# Patient Record
Sex: FEMALE | Race: WHITE | Employment: PART TIME | URBAN - METROPOLITAN AREA
[De-identification: names, ages, dates, MRNs, and addresses within clinical notes are randomized per-mention and may not be internally consistent; named-entity substitution may affect disease eponyms.]

---

## 2017-03-24 ENCOUNTER — EMPLOYEE WELLNESS (OUTPATIENT)
Dept: OTHER | Age: 57
End: 2017-03-24

## 2018-03-02 ENCOUNTER — EMPLOYEE WELLNESS (OUTPATIENT)
Dept: OTHER | Age: 58
End: 2018-03-02

## 2018-03-02 LAB
CHOLESTEROL, TOTAL: 201 MG/DL (ref 0–199)
GLUCOSE BLD-MCNC: 94 MG/DL (ref 74–107)
HDLC SERPL-MCNC: 96 MG/DL
LDL CHOLESTEROL CALCULATED: 86 MG/DL (ref 0–99)
TRIGL SERPL-MCNC: 94 MG/DL (ref 0–149)

## 2018-03-20 VITALS — BODY MASS INDEX: 20.55 KG/M2 | WEIGHT: 116 LBS

## 2018-04-02 VITALS — BODY MASS INDEX: 20.02 KG/M2 | WEIGHT: 113 LBS

## 2018-04-11 ENCOUNTER — HOSPITAL ENCOUNTER (OUTPATIENT)
Dept: GENERAL RADIOLOGY | Age: 58
Discharge: HOME | End: 2018-04-13
Payer: COMMERCIAL

## 2018-04-11 DIAGNOSIS — Z12.31 VISIT FOR SCREENING MAMMOGRAM: ICD-10-CM

## 2018-04-11 PROCEDURE — 77063 BREAST TOMOSYNTHESIS BI: CPT

## 2018-04-30 DIAGNOSIS — E03.9 ACQUIRED HYPOTHYROIDISM: ICD-10-CM

## 2018-04-30 RX ORDER — LEVOTHYROXINE SODIUM 50 MCG
50 TABLET ORAL DAILY
Qty: 90 TABLET | Refills: 1 | Status: SHIPPED | OUTPATIENT
Start: 2018-04-30 | End: 2018-05-04 | Stop reason: SDUPTHER

## 2018-05-04 ENCOUNTER — OFFICE VISIT (OUTPATIENT)
Dept: FAMILY MEDICINE CLINIC | Age: 58
End: 2018-05-04
Payer: COMMERCIAL

## 2018-05-04 ENCOUNTER — HOSPITAL ENCOUNTER (OUTPATIENT)
Age: 58
Discharge: HOME | End: 2018-05-06
Payer: COMMERCIAL

## 2018-05-04 VITALS
HEART RATE: 67 BPM | SYSTOLIC BLOOD PRESSURE: 122 MMHG | DIASTOLIC BLOOD PRESSURE: 68 MMHG | OXYGEN SATURATION: 98 % | BODY MASS INDEX: 20.98 KG/M2 | HEIGHT: 62 IN | WEIGHT: 114 LBS | TEMPERATURE: 97.7 F

## 2018-05-04 DIAGNOSIS — M25.551 RIGHT HIP PAIN: ICD-10-CM

## 2018-05-04 DIAGNOSIS — Z11.59 NEED FOR HEPATITIS C SCREENING TEST: ICD-10-CM

## 2018-05-04 DIAGNOSIS — E03.9 ACQUIRED HYPOTHYROIDISM: Primary | ICD-10-CM

## 2018-05-04 DIAGNOSIS — Z00.00 HEALTH CARE MAINTENANCE: ICD-10-CM

## 2018-05-04 DIAGNOSIS — Z12.11 COLON CANCER SCREENING: ICD-10-CM

## 2018-05-04 DIAGNOSIS — R53.82 CHRONIC FATIGUE: ICD-10-CM

## 2018-05-04 DIAGNOSIS — Z11.4 ENCOUNTER FOR SCREENING FOR HIV: ICD-10-CM

## 2018-05-04 PROCEDURE — 36415 COLL VENOUS BLD VENIPUNCTURE: CPT | Performed by: FAMILY MEDICINE

## 2018-05-04 PROCEDURE — 86803 HEPATITIS C AB TEST: CPT

## 2018-05-04 PROCEDURE — 99214 OFFICE O/P EST MOD 30 MIN: CPT | Performed by: FAMILY MEDICINE

## 2018-05-04 PROCEDURE — 86703 HIV-1/HIV-2 1 RESULT ANTBDY: CPT

## 2018-05-04 RX ORDER — LEVOTHYROXINE SODIUM 50 MCG
50 TABLET ORAL DAILY
Qty: 90 TABLET | Refills: 1
Start: 2018-05-04 | End: 2018-10-21 | Stop reason: SDUPTHER

## 2018-05-04 ASSESSMENT — ENCOUNTER SYMPTOMS
SPUTUM PRODUCTION: 0
SORE THROAT: 0
WHEEZING: 0
SHORTNESS OF BREATH: 0
COUGH: 0
ABDOMINAL PAIN: 0
HEARTBURN: 0
DOUBLE VISION: 0
NAUSEA: 0
ORTHOPNEA: 0
BACK PAIN: 0
DIARRHEA: 0
BLOOD IN STOOL: 0
VOMITING: 0
BLURRED VISION: 0
CONSTIPATION: 1

## 2018-05-04 ASSESSMENT — PATIENT HEALTH QUESTIONNAIRE - PHQ9
SUM OF ALL RESPONSES TO PHQ9 QUESTIONS 1 & 2: 1
1. LITTLE INTEREST OR PLEASURE IN DOING THINGS: 1
SUM OF ALL RESPONSES TO PHQ QUESTIONS 1-9: 1
2. FEELING DOWN, DEPRESSED OR HOPELESS: 0

## 2018-05-07 LAB
HEPATITIS C ANTIBODY INTERPRETATION: NORMAL
HIV-1 AND HIV-2 ANTIBODIES: NORMAL

## 2018-05-09 ENCOUNTER — TELEPHONE (OUTPATIENT)
Dept: SURGERY | Age: 58
End: 2018-05-09

## 2018-05-14 ENCOUNTER — TELEPHONE (OUTPATIENT)
Dept: SURGERY | Age: 58
End: 2018-05-14

## 2018-05-16 ENCOUNTER — TELEPHONE (OUTPATIENT)
Dept: SURGERY | Age: 58
End: 2018-05-16

## 2018-06-01 ENCOUNTER — TELEPHONE (OUTPATIENT)
Dept: SURGERY | Age: 58
End: 2018-06-01

## 2018-06-03 PROBLEM — Z00.00 HEALTH CARE MAINTENANCE: Status: RESOLVED | Noted: 2018-05-04 | Resolved: 2018-06-03

## 2018-06-06 ENCOUNTER — TELEPHONE (OUTPATIENT)
Dept: SURGERY | Age: 58
End: 2018-06-06

## 2018-07-10 ENCOUNTER — OFFICE VISIT (OUTPATIENT)
Dept: SURGERY | Age: 58
End: 2018-07-10
Payer: COMMERCIAL

## 2018-07-10 VITALS
RESPIRATION RATE: 16 BRPM | HEIGHT: 63 IN | DIASTOLIC BLOOD PRESSURE: 77 MMHG | BODY MASS INDEX: 20.38 KG/M2 | OXYGEN SATURATION: 99 % | WEIGHT: 115 LBS | TEMPERATURE: 97.9 F | SYSTOLIC BLOOD PRESSURE: 127 MMHG | HEART RATE: 64 BPM

## 2018-07-10 DIAGNOSIS — Z12.11 ENCOUNTER FOR SCREENING COLONOSCOPY: Primary | ICD-10-CM

## 2018-07-10 PROCEDURE — 99999 PR OFFICE/OUTPT VISIT,PROCEDURE ONLY: CPT | Performed by: SURGERY

## 2018-07-10 PROCEDURE — 99202 OFFICE O/P NEW SF 15 MIN: CPT | Performed by: SURGERY

## 2018-07-10 ASSESSMENT — ENCOUNTER SYMPTOMS
EYES NEGATIVE: 1
GASTROINTESTINAL NEGATIVE: 1
RESPIRATORY NEGATIVE: 1

## 2018-07-10 NOTE — PROGRESS NOTES
 polyethylene glycol (GOLYTELY) 236 g solution Take 4,000 mLs by mouth once for 1 dose 4000 mL 0    SYNTHROID 50 MCG tablet Take 1 tablet by mouth daily 90 tablet 1    clotrimazole-betamethasone (LOTRISONE) 1-0.05 % cream Apply topically 2 times daily Apply daily to feet, 3 weeks on and 1 week off, prescribed by Dr. Les Walsh      Multiple Vitamins-Minerals (THERAPEUTIC MULTIVITAMIN-MINERALS) tablet Take 1 tablet by mouth daily      Ascorbic Acid (VITAMIN C) 500 MG CAPS Take 2 tablets by mouth 2 times daily      calcium citrate-vitamin D (CITRICAL + D) 315-250 MG-UNIT TABS per tablet Take 1 tablet by mouth 2 times daily (with meals)      senna (SENOKOT) 8.6 MG tablet Take 2 tablets by mouth daily Indications: Constipation       estradiol (ESTRACE) 1 MG tablet Take 1 mg by mouth daily        No current facility-administered medications for this visit. Facility-Administered Medications Ordered in Other Visits   Medication Dose Route Frequency Provider Last Rate Last Dose    iohexol (OMNIPAQUE 240) injection 50 mL  50 mL Oral ONCE PRN Francies Schilder, MD         Review of Systems   Constitutional: Negative. HENT: Negative. Eyes: Negative. Respiratory: Negative. Cardiovascular: Negative. Gastrointestinal: Negative. Genitourinary: Negative. Musculoskeletal: Negative. Skin: Negative. Neurological: Negative. Endo/Heme/Allergies: Negative. Psychiatric/Behavioral: Negative. /77 (Site: Left Arm, Position: Sitting, Cuff Size: Medium Adult)   Pulse 64   Temp 97.9 °F (36.6 °C) (Oral)   Resp 16   Ht 5' 3\" (1.6 m)   Wt 115 lb (52.2 kg)   LMP 01/01/2005   SpO2 99%   BMI 20.37 kg/m²   Physical Exam   Constitutional: She is oriented to person, place, and time. She appears well-developed and well-nourished. HENT:   Head: Normocephalic and atraumatic. Eyes: EOM are normal. Pupils are equal, round, and reactive to light. Neck: Normal range of motion.  No tracheal

## 2018-07-10 NOTE — PATIENT INSTRUCTIONS
Patient Information and Instructions for Colonoscopy         Definition of Colonoscopy   A colonoscopy is the visual exam of the rectum and colon (large intestine). The exam is done with a tool called a colonoscope. The colonoscope is a flexible tube with a tiny camera on the end. This instrument allows the doctor to view the inside of your rectum and colon. Sigmoidoscopy is a shorter scope that views only the last one third of the colon. Reasons for Colonoscopy   It is used to examine, diagnose, and treat problems in your large intestine. The procedure is most often done for the following reasons: To determine the cause of abdominal pain, rectal bleeding, or a change in bowel habits   To detect and treat colon cancer or colon polyps   To obtain tissue samples for testing   To stop intestinal bleeding   Monitor response to treatment if you have inflammatory bowel disease     Possible Complications   Complications are rare, but no procedure is completely free of risk. If you are planning to have a colonoscopy, your doctor will review a list of possible complications, which may include:   Bleeding   Reaction to the sedation causing drop in your blood pressure or problems breathing  Perforation or puncture of the bowel     Factors that may increase the risk of complications include:   Pre-existing heart or kidney condition   Treatment with certain medicines, including aspirin and other drugs with anticoagulant or blood-thinning properties   Prior abdominal surgery or radiation treatments   Active colitis , diverticulitis , or other acute bowel disease   Previous treatment with radiation therapy     Be sure to discuss these risks with your doctor before the procedure.      What to Expect   Prior to Procedure   Your doctor will likely do the following:   Physical exam   Health history   Review of medicines   Test your stool for hidden blood (called \"occult blood\")     Your colon must be completely clean before the liquids before the prep. Stay hydrated by drinking all required clear liquids during the prep. Replenish your system by drinking clear liquids after returning home from your colonoscopy. [facility] [place] [facility]  Golytely or Nulytely  COLON PREP FOR COLONOSCOPY OR COLON SURGERY    It is very important that you follow all of the instructions listed on this sheet carefully (they may be slightly different than the directions on the product that you purchase at the pharmacy) to ensure that you colon is adequately cleaned out or you risk of complications could be increased. 2 Days or More Before Endoscopy:   Obtain Golytely, Colyte or Nulytely, depending on the prescription the surgeon gave you, from the pharmacy.  Mix Golytely, Colyte or Nulytely according to instructions and refrigerate.  Do not eat corn, tomatoes, peas or watermelon 3 to 5 days before procedure.  If you are on INSULIN or OTHER DIABETIC MEDICATIONS, then check with your primary care physician as to how to adjust your medication while on clear liquid diet and when nothing by mouth. 1 Day Before the Endoscopy:   No solid food - only clear liquids (soup, jello, or juice that you can see through with no solid food) for breakfast, lunch and supper. ·  DO NOT drink or eat anything that is red or purple as it will turn the inside of the colon red and look like blood.  Begin drinking the Golytely, Colyte or Nulytely early in the afternoon (between 1pm and 4pm - the earlier the better). Drink and 8oz glass of this solution every 10 minutes until you have drank the entire gallon. It is best to drink the whole glass rapidly rather than sipping small amounts continuously.  Bowel movements should occur about one hour after the first glass of Golytely, Colyte, or Nulytely. They will continue periodically for approximately 1-2 hours after you finish drinking the last glass.   By this time the stool should be liquid and clear.   Feelings of bloating and/or nausea are common after the first few glasses because of the large volume of fluid ingested. This is temporary, however, and will improve once bowel movements begin.  If you have nausea or vomiting, notify your physician. Day of Endoscopy:   Nothing to eat or drink after midnight the night before the endoscopy. However, if you are taking any blood pressure medications or heart medications, you should take them with a sip of water. Any questions or concerns call Nel at [phone].

## 2018-07-26 ENCOUNTER — TELEPHONE (OUTPATIENT)
Dept: SURGERY | Age: 58
End: 2018-07-26

## 2018-07-26 NOTE — TELEPHONE ENCOUNTER
MARILIN Morgan contacted [employer] [facility] for prior authorization. No prior authorization needed for colonoscopy with Dr. David Loaiza at [address] [place] [address] in [facility] [place]. MA Spoke with Jimmy Washington, authorization Specialist. Reference number [id number]. MA scanned authorization form in media tab.     Electronically signed by Andrew Rao MA on 7/26/18 at 9:31 AM

## 2018-08-04 ENCOUNTER — PREP FOR PROCEDURE (OUTPATIENT)
Dept: SURGERY | Age: 58
End: 2018-08-04

## 2018-08-04 RX ORDER — 0.9 % SODIUM CHLORIDE 0.9 %
10 VIAL (ML) INJECTION PRN
Status: CANCELLED | OUTPATIENT
Start: 2018-08-04 | End: 2019-08-04

## 2018-08-04 RX ORDER — 0.9 % SODIUM CHLORIDE 0.9 %
10 VIAL (ML) INJECTION EVERY 12 HOURS SCHEDULED
Status: CANCELLED | OUTPATIENT
Start: 2018-08-04 | End: 2019-08-04

## 2018-08-04 RX ORDER — SODIUM CHLORIDE 9 MG/ML
INJECTION, SOLUTION INTRAVENOUS CONTINUOUS
Status: CANCELLED | OUTPATIENT
Start: 2018-08-04 | End: 2019-08-04

## 2018-08-06 ENCOUNTER — ANESTHESIA (OUTPATIENT)
Dept: ENDOSCOPY | Age: 58
End: 2018-08-06
Payer: COMMERCIAL

## 2018-08-06 ENCOUNTER — HOSPITAL ENCOUNTER (OUTPATIENT)
Age: 58
Setting detail: OUTPATIENT SURGERY
Discharge: HOME | End: 2018-08-06
Attending: SURGERY | Admitting: SURGERY
Payer: COMMERCIAL

## 2018-08-06 ENCOUNTER — ANESTHESIA EVENT (OUTPATIENT)
Dept: ENDOSCOPY | Age: 58
End: 2018-08-06
Payer: COMMERCIAL

## 2018-08-06 VITALS
DIASTOLIC BLOOD PRESSURE: 53 MMHG | RESPIRATION RATE: 11 BRPM | SYSTOLIC BLOOD PRESSURE: 91 MMHG | OXYGEN SATURATION: 99 %

## 2018-08-06 VITALS
SYSTOLIC BLOOD PRESSURE: 112 MMHG | TEMPERATURE: 97 F | OXYGEN SATURATION: 100 % | HEIGHT: 63 IN | HEART RATE: 55 BPM | RESPIRATION RATE: 16 BRPM | DIASTOLIC BLOOD PRESSURE: 70 MMHG | BODY MASS INDEX: 20.38 KG/M2 | WEIGHT: 115 LBS

## 2018-08-06 PROCEDURE — 3700000001 HC ADD 15 MINUTES (ANESTHESIA): Performed by: SURGERY

## 2018-08-06 PROCEDURE — 7100000011 HC PHASE II RECOVERY - ADDTL 15 MIN: Performed by: SURGERY

## 2018-08-06 PROCEDURE — 2580000003 HC RX 258: Performed by: SURGERY

## 2018-08-06 PROCEDURE — 3700000000 HC ANESTHESIA ATTENDED CARE: Performed by: SURGERY

## 2018-08-06 PROCEDURE — 3609010300 HC COLONOSCOPY W/BIOPSY SINGLE/MULTIPLE: Performed by: SURGERY

## 2018-08-06 PROCEDURE — 6360000002 HC RX W HCPCS: Performed by: PHYSICIAN ASSISTANT

## 2018-08-06 PROCEDURE — 2720000010 HC SURG SUPPLY STERILE: Performed by: SURGERY

## 2018-08-06 PROCEDURE — 7100000010 HC PHASE II RECOVERY - FIRST 15 MIN: Performed by: SURGERY

## 2018-08-06 PROCEDURE — 88305 TISSUE EXAM BY PATHOLOGIST: CPT

## 2018-08-06 PROCEDURE — 45380 COLONOSCOPY AND BIOPSY: CPT | Performed by: SURGERY

## 2018-08-06 RX ORDER — PROPOFOL 10 MG/ML
INJECTION, EMULSION INTRAVENOUS PRN
Status: DISCONTINUED | OUTPATIENT
Start: 2018-08-06 | End: 2018-08-06 | Stop reason: SDUPTHER

## 2018-08-06 RX ORDER — FENTANYL CITRATE 50 UG/ML
INJECTION, SOLUTION INTRAMUSCULAR; INTRAVENOUS PRN
Status: DISCONTINUED | OUTPATIENT
Start: 2018-08-06 | End: 2018-08-06 | Stop reason: SDUPTHER

## 2018-08-06 RX ORDER — 0.9 % SODIUM CHLORIDE 0.9 %
10 VIAL (ML) INJECTION EVERY 12 HOURS SCHEDULED
Status: DISCONTINUED | OUTPATIENT
Start: 2018-08-06 | End: 2018-08-06 | Stop reason: HOSPADM

## 2018-08-06 RX ORDER — MIDAZOLAM HYDROCHLORIDE 1 MG/ML
INJECTION INTRAMUSCULAR; INTRAVENOUS PRN
Status: DISCONTINUED | OUTPATIENT
Start: 2018-08-06 | End: 2018-08-06 | Stop reason: SDUPTHER

## 2018-08-06 RX ORDER — 0.9 % SODIUM CHLORIDE 0.9 %
10 VIAL (ML) INJECTION PRN
Status: DISCONTINUED | OUTPATIENT
Start: 2018-08-06 | End: 2018-08-06 | Stop reason: HOSPADM

## 2018-08-06 RX ORDER — SODIUM CHLORIDE 9 MG/ML
INJECTION, SOLUTION INTRAVENOUS CONTINUOUS
Status: DISCONTINUED | OUTPATIENT
Start: 2018-08-06 | End: 2018-08-06 | Stop reason: HOSPADM

## 2018-08-06 RX ORDER — PROPOFOL 10 MG/ML
INJECTION, EMULSION INTRAVENOUS PRN
Status: DISCONTINUED | OUTPATIENT
Start: 2018-08-06 | End: 2018-08-06

## 2018-08-06 RX ADMIN — PROPOFOL 210 MG: 10 INJECTION, EMULSION INTRAVENOUS at 12:45

## 2018-08-06 RX ADMIN — FENTANYL CITRATE 100 MCG: 50 INJECTION, SOLUTION INTRAMUSCULAR; INTRAVENOUS at 12:20

## 2018-08-06 RX ADMIN — MIDAZOLAM HYDROCHLORIDE 2 MG: 1 INJECTION, SOLUTION INTRAMUSCULAR; INTRAVENOUS at 12:20

## 2018-08-06 RX ADMIN — SODIUM CHLORIDE: 9 INJECTION, SOLUTION INTRAVENOUS at 12:20

## 2018-08-06 ASSESSMENT — PAIN SCALES - GENERAL
PAINLEVEL_OUTOF10: 0
PAINLEVEL_OUTOF10: 0

## 2018-08-06 ASSESSMENT — PAIN - FUNCTIONAL ASSESSMENT: PAIN_FUNCTIONAL_ASSESSMENT: 0-10

## 2018-08-06 NOTE — H&P (VIEW-ONLY)
to person, place, and time. She appears well-developed and well-nourished. HENT:   Head: Normocephalic and atraumatic. Eyes: EOM are normal. Pupils are equal, round, and reactive to light. Neck: Normal range of motion. No tracheal deviation present. Cardiovascular: Normal rate and regular rhythm. Pulmonary/Chest: Effort normal and breath sounds normal.   Abdominal: Soft. She exhibits no distension. There is no tenderness. Musculoskeletal: Normal range of motion. She exhibits no edema. Neurological: She is alert and oriented to person, place, and time. Skin: Skin is warm and dry. Psychiatric: She has a normal mood and affect. Her behavior is normal. Judgment and thought content normal.      ASSESSMENT/PLAN:  1. Age as a risk factor for colon cancer--colonoscopy     Colonoscopy. The patient was explained the risks/benefits/alternatives/expected outcomes of the procedure. The patient was explained the risks of the procedure, including, but not limited to, the risk of reaction to the anesthesia medicine and the risk of perforation requiring further surgery. The patient was informed that they may require biopsy or polypectomy. These procedures may increase the risk of complication. All questions were answered.   The patient verbalized understanding and agreed to proceed.     Darian Hurst  7/10/2018  1:29 PM

## 2018-09-19 ENCOUNTER — PATIENT MESSAGE (OUTPATIENT)
Dept: FAMILY MEDICINE CLINIC | Age: 58
End: 2018-09-19

## 2018-09-19 DIAGNOSIS — Z78.0 POSTMENOPAUSAL: ICD-10-CM

## 2018-09-19 DIAGNOSIS — M19.90 ARTHRITIS: Primary | ICD-10-CM

## 2018-09-19 DIAGNOSIS — E03.9 ACQUIRED HYPOTHYROIDISM: ICD-10-CM

## 2018-09-25 ENCOUNTER — HOSPITAL ENCOUNTER (OUTPATIENT)
Age: 58
Discharge: HOME | End: 2018-09-25
Payer: COMMERCIAL

## 2018-09-25 DIAGNOSIS — E03.9 ACQUIRED HYPOTHYROIDISM: ICD-10-CM

## 2018-09-25 DIAGNOSIS — Z78.0 POSTMENOPAUSAL: ICD-10-CM

## 2018-09-25 DIAGNOSIS — M19.90 ARTHRITIS: ICD-10-CM

## 2018-09-25 LAB
BASOPHILS ABSOLUTE: 0.02 E9/L (ref 0–0.2)
BASOPHILS RELATIVE PERCENT: 0.4 % (ref 0–2)
EOSINOPHILS ABSOLUTE: 0.12 E9/L (ref 0.05–0.5)
EOSINOPHILS RELATIVE PERCENT: 2.3 % (ref 0–6)
HCT VFR BLD CALC: 44.1 % (ref 34–48)
HEMOGLOBIN: 14.2 G/DL (ref 11.5–15.5)
IMMATURE GRANULOCYTES #: 0.02 E9/L
IMMATURE GRANULOCYTES %: 0.4 % (ref 0–5)
LYMPHOCYTES ABSOLUTE: 1.76 E9/L (ref 1.5–4)
LYMPHOCYTES RELATIVE PERCENT: 33.5 % (ref 20–42)
MCH RBC QN AUTO: 29.9 PG (ref 26–35)
MCHC RBC AUTO-ENTMCNC: 32.2 % (ref 32–34.5)
MCV RBC AUTO: 92.8 FL (ref 80–99.9)
MONOCYTES ABSOLUTE: 0.44 E9/L (ref 0.1–0.95)
MONOCYTES RELATIVE PERCENT: 8.4 % (ref 2–12)
NEUTROPHILS ABSOLUTE: 2.9 E9/L (ref 1.8–7.3)
NEUTROPHILS RELATIVE PERCENT: 55 % (ref 43–80)
PDW BLD-RTO: 13.7 FL (ref 11.5–15)
PLATELET # BLD: 257 E9/L (ref 130–450)
PMV BLD AUTO: 10.2 FL (ref 7–12)
RBC # BLD: 4.75 E12/L (ref 3.5–5.5)
WBC # BLD: 5.3 E9/L (ref 4.5–11.5)

## 2018-09-25 PROCEDURE — 36415 COLL VENOUS BLD VENIPUNCTURE: CPT

## 2018-09-25 PROCEDURE — 85025 COMPLETE CBC W/AUTO DIFF WBC: CPT

## 2018-10-21 DIAGNOSIS — E03.9 ACQUIRED HYPOTHYROIDISM: ICD-10-CM

## 2018-10-21 RX ORDER — LEVOTHYROXINE SODIUM 50 MCG
50 TABLET ORAL DAILY
Qty: 90 TABLET | Refills: 1 | Status: SHIPPED | OUTPATIENT
Start: 2018-10-21 | End: 2019-04-10 | Stop reason: SDUPTHER

## 2018-10-22 ENCOUNTER — E-VISIT (OUTPATIENT)
Dept: FAMILY MEDICINE CLINIC | Age: 58
End: 2018-10-22
Payer: COMMERCIAL

## 2018-10-22 ENCOUNTER — NURSE TRIAGE (OUTPATIENT)
Dept: OTHER | Facility: CLINIC | Age: 58
End: 2018-10-22

## 2018-10-22 DIAGNOSIS — B96.89 ACUTE BACTERIAL SINUSITIS: Primary | ICD-10-CM

## 2018-10-22 DIAGNOSIS — J01.90 ACUTE BACTERIAL SINUSITIS: Primary | ICD-10-CM

## 2018-10-22 PROCEDURE — 99444 PR PHYSICIAN ONLINE EVALUATION & MANAGEMENT SERVICE: CPT | Performed by: FAMILY MEDICINE

## 2018-10-22 PROCEDURE — 98969 PR NONPHYSICIAN ONLINE ASSESSMENT AND MANAGEMENT: CPT | Performed by: NURSE PRACTITIONER

## 2018-10-22 RX ORDER — AMOXICILLIN AND CLAVULANATE POTASSIUM 875; 125 MG/1; MG/1
1 TABLET, FILM COATED ORAL 2 TIMES DAILY
Qty: 20 TABLET | Refills: 0 | Status: SHIPPED | OUTPATIENT
Start: 2018-10-22 | End: 2018-11-01

## 2018-10-22 ASSESSMENT — LIFESTYLE VARIABLES
SMOKING_STATUS: NO
SMOKING_STATUS: NO

## 2018-11-13 ENCOUNTER — E-VISIT (OUTPATIENT)
Dept: FAMILY MEDICINE CLINIC | Age: 58
End: 2018-11-13
Payer: COMMERCIAL

## 2018-11-13 DIAGNOSIS — J01.90 ACUTE SINUSITIS, RECURRENCE NOT SPECIFIED, UNSPECIFIED LOCATION: Primary | ICD-10-CM

## 2018-11-13 PROCEDURE — 98969 PR NONPHYSICIAN ONLINE ASSESSMENT AND MANAGEMENT: CPT | Performed by: NURSE PRACTITIONER

## 2018-11-13 RX ORDER — AMOXICILLIN AND CLAVULANATE POTASSIUM 875; 125 MG/1; MG/1
1 TABLET, FILM COATED ORAL 2 TIMES DAILY
Qty: 20 TABLET | Refills: 0 | Status: SHIPPED | OUTPATIENT
Start: 2018-11-13 | End: 2018-11-23

## 2018-11-13 ASSESSMENT — LIFESTYLE VARIABLES: SMOKING_STATUS: NO

## 2018-11-13 NOTE — PROGRESS NOTES
tablet 1    clotrimazole-betamethasone (LOTRISONE) 1-0.05 % cream Apply topically 2 times daily Apply daily to feet, 3 weeks on and 1 week off, prescribed by Dr. Adriel Thomas      Multiple Vitamins-Minerals (THERAPEUTIC MULTIVITAMIN-MINERALS) tablet Take 1 tablet by mouth daily      Ascorbic Acid (VITAMIN C) 500 MG CAPS Take 2 tablets by mouth 2 times daily      calcium citrate-vitamin D (CITRICAL + D) 315-250 MG-UNIT TABS per tablet Take 1 tablet by mouth 2 times daily (with meals)      senna (SENOKOT) 8.6 MG tablet Take 2 tablets by mouth daily Indications: Constipation       estradiol (ESTRACE) 1 MG tablet Take 1 mg by mouth daily        Current Facility-Administered Medications on File Prior to Visit   Medication Dose Route Frequency Provider Last Rate Last Dose    iohexol (OMNIPAQUE 240) injection 50 mL  50 mL Oral ONCE PRN Maggie Odom MD         Allergies   Allergen Reactions    Diclofenac Sodium Rash    Sulfa Antibiotics Rash       Review of Systems    Sinus pain and pressure  Congestion  Denies fevers  No cough    Objective    Physical Exam     N/A    Assessment & Plan     Diagnosis Orders   1. Acute sinusitis, recurrence not specified, unspecified location  amoxicillin-clavulanate (AUGMENTIN) 875-125 MG per tablet       No orders of the defined types were placed in this encounter. Orders Placed This Encounter   Medications    amoxicillin-clavulanate (AUGMENTIN) 875-125 MG per tablet     Sig: Take 1 tablet by mouth 2 times daily for 10 days     Dispense:  20 tablet     Refill:  0     Fu in office if symptoms not improving within a few days    Side effects, adverse effects of the medication prescribed today, as well as treatment plan/ rationale and result expectations have been discussed with the patient who expresses understanding and desires to proceed. Close follow up to evaluate treatment results and for coordination of care.   I have reviewed the patient's medical history in detail and

## 2019-03-01 DIAGNOSIS — J11.1 INFLUENZA: Primary | ICD-10-CM

## 2019-03-01 RX ORDER — OSELTAMIVIR PHOSPHATE 75 MG/1
75 CAPSULE ORAL 2 TIMES DAILY
Qty: 10 CAPSULE | Refills: 0 | Status: SHIPPED | OUTPATIENT
Start: 2019-03-01 | End: 2019-03-06

## 2019-03-27 ENCOUNTER — E-VISIT (OUTPATIENT)
Dept: FAMILY MEDICINE CLINIC | Age: 59
End: 2019-03-27
Payer: COMMERCIAL

## 2019-03-27 DIAGNOSIS — J34.89 SINUS PRESSURE: Primary | ICD-10-CM

## 2019-03-27 DIAGNOSIS — R09.81 NASAL CONGESTION: ICD-10-CM

## 2019-03-27 PROCEDURE — 98969 PR NONPHYSICIAN ONLINE ASSESSMENT AND MANAGEMENT: CPT | Performed by: NURSE PRACTITIONER

## 2019-03-27 RX ORDER — FLUTICASONE PROPIONATE 50 MCG
2 SPRAY, SUSPENSION (ML) NASAL DAILY
Qty: 1 BOTTLE | Refills: 0 | Status: SHIPPED | OUTPATIENT
Start: 2019-03-27 | End: 2019-05-31

## 2019-03-27 RX ORDER — AMOXICILLIN AND CLAVULANATE POTASSIUM 875; 125 MG/1; MG/1
1 TABLET, FILM COATED ORAL 2 TIMES DAILY
Qty: 14 TABLET | Refills: 0 | Status: SHIPPED | OUTPATIENT
Start: 2019-03-27 | End: 2019-04-03

## 2019-03-27 ASSESSMENT — LIFESTYLE VARIABLES: SMOKING_STATUS: NO, I'VE NEVER SMOKED

## 2019-04-12 ENCOUNTER — HOSPITAL ENCOUNTER (OUTPATIENT)
Dept: GENERAL RADIOLOGY | Age: 59
Discharge: HOME | End: 2019-04-14
Payer: COMMERCIAL

## 2019-04-12 DIAGNOSIS — Z12.31 VISIT FOR SCREENING MAMMOGRAM: ICD-10-CM

## 2019-04-12 PROCEDURE — 77063 BREAST TOMOSYNTHESIS BI: CPT

## 2019-04-27 LAB
CHOLESTEROL, TOTAL: 209 MG/DL (ref 0–199)
GLUCOSE BLD-MCNC: 93 MG/DL (ref 74–107)
HDLC SERPL-MCNC: 89 MG/DL
LDL CHOLESTEROL CALCULATED: 105 MG/DL (ref 0–99)
TRIGL SERPL-MCNC: 76 MG/DL (ref 0–149)

## 2019-05-31 ENCOUNTER — HOSPITAL ENCOUNTER (OUTPATIENT)
Age: 59
Discharge: HOME | End: 2019-05-31
Payer: COMMERCIAL

## 2019-05-31 ENCOUNTER — OFFICE VISIT (OUTPATIENT)
Dept: FAMILY MEDICINE CLINIC | Age: 59
End: 2019-05-31
Payer: COMMERCIAL

## 2019-05-31 VITALS
HEART RATE: 72 BPM | TEMPERATURE: 98.1 F | DIASTOLIC BLOOD PRESSURE: 64 MMHG | HEIGHT: 62 IN | OXYGEN SATURATION: 98 % | WEIGHT: 112 LBS | RESPIRATION RATE: 16 BRPM | BODY MASS INDEX: 20.61 KG/M2 | SYSTOLIC BLOOD PRESSURE: 92 MMHG

## 2019-05-31 DIAGNOSIS — Z78.0 POSTMENOPAUSAL: ICD-10-CM

## 2019-05-31 DIAGNOSIS — E03.9 ACQUIRED HYPOTHYROIDISM: ICD-10-CM

## 2019-05-31 DIAGNOSIS — Z23 NEED FOR SHINGLES VACCINE: ICD-10-CM

## 2019-05-31 DIAGNOSIS — E03.9 ACQUIRED HYPOTHYROIDISM: Primary | ICD-10-CM

## 2019-05-31 DIAGNOSIS — Z00.00 HEALTH CARE MAINTENANCE: ICD-10-CM

## 2019-05-31 DIAGNOSIS — M19.90 ARTHRITIS: ICD-10-CM

## 2019-05-31 LAB
C-REACTIVE PROTEIN: <0.1 MG/DL (ref 0–0.4)
RHEUMATOID FACTOR: 10 IU/ML (ref 0–13)
SEDIMENTATION RATE, ERYTHROCYTE: 3 MM/HR (ref 0–20)
TSH SERPL DL<=0.05 MIU/L-ACNC: 2.03 UIU/ML (ref 0.27–4.2)

## 2019-05-31 PROCEDURE — 86140 C-REACTIVE PROTEIN: CPT

## 2019-05-31 PROCEDURE — 86038 ANTINUCLEAR ANTIBODIES: CPT

## 2019-05-31 PROCEDURE — 36415 COLL VENOUS BLD VENIPUNCTURE: CPT

## 2019-05-31 PROCEDURE — 99214 OFFICE O/P EST MOD 30 MIN: CPT | Performed by: FAMILY MEDICINE

## 2019-05-31 PROCEDURE — 84443 ASSAY THYROID STIM HORMONE: CPT

## 2019-05-31 PROCEDURE — 86431 RHEUMATOID FACTOR QUANT: CPT

## 2019-05-31 PROCEDURE — 85651 RBC SED RATE NONAUTOMATED: CPT

## 2019-05-31 RX ORDER — LEVOTHYROXINE SODIUM 50 MCG
50 TABLET ORAL DAILY
Qty: 90 TABLET | Refills: 3 | Status: SHIPPED
Start: 2019-05-31 | End: 2020-04-21 | Stop reason: SDUPTHER

## 2019-05-31 ASSESSMENT — ENCOUNTER SYMPTOMS
CONSTIPATION: 1
BACK PAIN: 0
NAUSEA: 0
DIARRHEA: 0
DOUBLE VISION: 0
ORTHOPNEA: 0
SORE THROAT: 0
COUGH: 0
HEARTBURN: 0
SHORTNESS OF BREATH: 0
WHEEZING: 0
BLURRED VISION: 0
SPUTUM PRODUCTION: 0
VOMITING: 0
ABDOMINAL PAIN: 0
BLOOD IN STOOL: 0

## 2019-05-31 ASSESSMENT — PATIENT HEALTH QUESTIONNAIRE - PHQ9
SUM OF ALL RESPONSES TO PHQ QUESTIONS 1-9: 0
SUM OF ALL RESPONSES TO PHQ9 QUESTIONS 1 & 2: 0
2. FEELING DOWN, DEPRESSED OR HOPELESS: 0
1. LITTLE INTEREST OR PLEASURE IN DOING THINGS: 0
SUM OF ALL RESPONSES TO PHQ QUESTIONS 1-9: 0

## 2019-05-31 NOTE — PATIENT INSTRUCTIONS
Patient Education        Sinusitis: Care Instructions  Your Care Instructions    Sinusitis is an infection of the lining of the sinus cavities in your head. Sinusitis often follows a cold. It causes pain and pressure in your head and face. In most cases, sinusitis gets better on its own in 1 to 2 weeks. But some mild symptoms may last for several weeks. Sometimes antibiotics are needed. Follow-up care is a key part of your treatment and safety. Be sure to make and go to all appointments, and call your doctor if you are having problems. It's also a good idea to know your test results and keep a list of the medicines you take. How can you care for yourself at home? · Take an over-the-counter pain medicine, such as acetaminophen (Tylenol), ibuprofen (Advil, Motrin), or naproxen (Aleve). Read and follow all instructions on the label. · If the doctor prescribed antibiotics, take them as directed. Do not stop taking them just because you feel better. You need to take the full course of antibiotics. · Be careful when taking over-the-counter cold or flu medicines and Tylenol at the same time. Many of these medicines have acetaminophen, which is Tylenol. Read the labels to make sure that you are not taking more than the recommended dose. Too much acetaminophen (Tylenol) can be harmful. · Breathe warm, moist air from a steamy shower, a hot bath, or a sink filled with hot water. Avoid cold, dry air. Using a humidifier in your home may help. Follow the directions for cleaning the machine. · Use saline (saltwater) nasal washes to help keep your nasal passages open and wash out mucus and bacteria. You can buy saline nose drops at a grocery store or drugstore. Or you can make your own at home by adding 1 teaspoon of salt and 1 teaspoon of baking soda to 2 cups of distilled water. If you make your own, fill a bulb syringe with the solution, insert the tip into your nostril, and squeeze gently. Wayna Bradford your nose.   · Put a hot, wet towel or a warm gel pack on your face 3 or 4 times a day for 5 to 10 minutes each time. · Try a decongestant nasal spray like oxymetazoline (Afrin). Do not use it for more than 3 days in a row. Using it for more than 3 days can make your congestion worse. When should you call for help? Call your doctor now or seek immediate medical care if:    · You have new or worse swelling or redness in your face or around your eyes.     · You have a new or higher fever.    Watch closely for changes in your health, and be sure to contact your doctor if:    · You have new or worse facial pain.     · The mucus from your nose becomes thicker (like pus) or has new blood in it.     · You are not getting better as expected. Where can you learn more? Go to https://Zafupejosi[employer]b.[employer]. org and sign in to your [employer] account. Enter E563 in the [employer] box to learn more about \"Sinusitis: Care Instructions. \"     If you do not have an account, please click on the \"Sign Up Now\" link. Current as of: October 21, 2018  Content Version: 12.0  © 1749-5042 [employer]. Care instructions adapted under license by [facility] ([facility]). If you have questions about a medical condition or this instruction, always ask your healthcare professional. [place] [address] any warranty or liability for your use of this information. Patient Education        Saline Nasal Washes: Care Instructions  Your Care Instructions  Saline nasal washes help keep the nasal passages open by washing out thick or dried mucus. This simple remedy can help relieve symptoms of allergies, sinusitis, and colds. It also can make the nose feel more comfortable by keeping the mucous membranes moist. You may notice a little burning sensation in your nose the first few times you use the solution, but this usually gets better in a few days. Follow-up care is a key part of your treatment and safety.  Be sure to make and go to all appointments, and call your doctor if you are having problems. It's also a good idea to know your test results and keep a list of the medicines you take. How can you care for yourself at home? · You can buy premixed saline solution in a squeeze bottle or other sinus rinse products at a drugstore. Read and follow the instructions on the label. · You also can make your own saline solution by adding 1 teaspoon of salt and 1 teaspoon of baking soda to 2 cups of distilled water. · If you use a homemade solution, pour a small amount into a clean bowl. Using a rubber bulb syringe, squeeze the syringe and place the tip in the salt water. Pull a small amount of the salt water into the syringe by relaxing your hand. · Sit down with your head tilted slightly back. Do not lie down. Put the tip of the bulb syringe or the squeeze bottle a little way into one of your nostrils. Gently drip or squirt a few drops into the nostril. Repeat with the other nostril. Some sneezing and gagging are normal at first.  · Gently blow your nose. · Wipe the syringe or bottle tip clean after each use. · Repeat this 2 or 3 times a day. · Use nasal washes gently if you have nosebleeds often. When should you call for help? Watch closely for changes in your health, and be sure to contact your doctor if:    · You often get nosebleeds.     · You have problems doing the nasal washes. Where can you learn more? Go to https://[employer]lizeth.[employer]. org and sign in to your [employer] account. Enter [phone] in the [employer] box to learn more about \"Saline Nasal Washes: Care Instructions. \"     If you do not have an account, please click on the \"Sign Up Now\" link. Current as of: October 21, 2018  Content Version: 12.0  © 8308-8480 [employer]. Care instructions adapted under license by [facility] ([facility]).  If you have questions about a medical condition or this instruction, always ask your healthcare professional. [employer] disclaims any warranty or liability for your use of this information.

## 2019-05-31 NOTE — PROGRESS NOTES
Jose Donald is a 62 y.o. female who presents today for     Chief Complaint   Patient presents with    Annual Exam    Medication Refill     synthroid       She is treated for hypothyroidism and Postmenopausal symptoms. Her chief concern is recurrent (2-3) serious sinus infections since last year requiring multiple Evisits to treat. Current symptoms now are simply runny nose. No conservative or PO treatments     Joint Pain:  Joints involved include right hip, hands and low back. Sporadic exercise currently; she reports that she does feel better. Currently on no medication for arthritis    Fatigue: Onset of symptoms dates back to 11/17. Symptoms of poor energy, mild intermittent anhedonia. Reports adequate quality/quantity sleep, adequate nutrition/hydration. However, she fell out of regular exercise habits. She feels overwhelmed at work, which she feels that it is spilling over into home life. Hypothyroidism:  Patient with history chronic hypothyroidism. This is  generally controlled on current medication regimen. Takes medication as directed and tolerates well. Symptoms from thyroid standpoint include constipation. Most recent labs reviewed with patient and are not remarkable. Thyroid function in particular is  controlled. Thyroid ultrasound has not been done in the past and is not remarkable. Thyroid antibodies have not been done in the past and are not remarkable. Patient does not have exposure to radiation and/or iodine in the past.    BeWell Within:  Patient completed BeWell  Within screening. 4/2019. Health Maintenance:  Jose Donald is due for TSH and Shingles vaccine. She is agreeable to all    [address] [place], [place] [address]way:  Patient's past medical, surgical, social and/or family history reviewed, updated in chart, and are non-contributory (unless otherwise stated). Medications and allergies also reviewed and updated in chart.       Review of Systems  Review of Systems   Constitutional: Negative for malaise/fatigue and weight loss. HENT: Negative for congestion, ear pain and sore throat. Eyes: Negative for blurred vision and double vision. Respiratory: Negative for cough, sputum production, shortness of breath and wheezing. Cardiovascular: Negative for chest pain, palpitations, orthopnea and leg swelling. Gastrointestinal: Positive for constipation (frequently takes Senna). Negative for abdominal pain, blood in stool, diarrhea, heartburn, nausea and vomiting. Genitourinary: Negative for dysuria, frequency, hematuria and urgency. Musculoskeletal: Positive for joint pain (right hip). Negative for back pain, myalgias and neck pain. Skin: Negative for itching and rash. Neurological: Negative for dizziness, tingling, focal weakness, weakness and headaches. Psychiatric/Behavioral: Negative for depression, memory loss and substance abuse. The patient is not nervous/anxious and does not have insomnia. Physical Exam:    VS:    BP 92/64 (Site: Right Upper Arm, Position: Sitting, Cuff Size: Medium Adult)   Pulse 72   Temp 98.1 °F (36.7 °C) (Oral)   Resp 16   Ht 5' 2\" (1.575 m)   Wt 112 lb (50.8 kg)   LMP 01/01/2005   SpO2 98%   Breastfeeding? No   BMI 20.49 kg/m²   LAST WEIGHT:  Wt Readings from Last 3 Encounters:   05/31/19 112 lb (50.8 kg)   08/06/18 115 lb (52.2 kg)   07/10/18 115 lb (52.2 kg)     Physical Exam   Constitutional: She is oriented to person, place, and time. She appears well-developed and well-nourished. No distress. HENT:   Head: Normocephalic and atraumatic. Right Ear: External ear normal.   Left Ear: External ear normal.   Mouth/Throat: Oropharynx is clear and moist. No oropharyngeal exudate. Eyes: Pupils are equal, round, and reactive to light. Conjunctivae and EOM are normal. Right eye exhibits no discharge. No scleral icterus. Neck: Normal range of motion. Neck supple. No thyromegaly present.    Cardiovascular: Normal rate, regular rhythm, normal heart sounds and intact distal pulses. No murmur heard. Pulmonary/Chest: Effort normal. No stridor. No respiratory distress. She has no wheezes. She has no rales. She exhibits no tenderness. Abdominal: Soft. Bowel sounds are normal. She exhibits no distension and no mass. There is no tenderness. There is no guarding. Musculoskeletal: Normal range of motion. She exhibits no edema or tenderness. Swelling of several MCP joints without significant pain, loss of strength or sensation   Lymphadenopathy:     She has no cervical adenopathy. Neurological: She is alert and oriented to person, place, and time. Skin: Skin is warm and dry. No rash noted. She is not diaphoretic. No erythema. No pallor. Psychiatric: She has a normal mood and affect.  Her behavior is normal. Thought content normal.       Labs:  Lab Results   Component Value Date     12/19/2017    K 4.6 12/19/2017     12/19/2017    CO2 24 12/19/2017    BUN 10 12/19/2017    CREATININE 0.7 12/19/2017    PROT 6.4 12/19/2017    LABALBU 4.3 12/19/2017    CALCIUM 9.1 12/19/2017    GFRAA >60 12/19/2017    LABGLOM >60 12/19/2017    GLUCOSE 93 04/27/2019    AST 20 12/19/2017    ALT 17 12/19/2017    ALKPHOS 62 12/19/2017    BILITOT 0.6 12/19/2017    TSH 0.920 12/19/2017    CHOL 209 04/27/2019    TRIG 76 04/27/2019    HDL 89 04/27/2019    LDLCALC 105 04/27/2019        Lab Results   Component Value Date    CHOL 209 (H) 04/27/2019    CHOL 201 (H) 03/02/2018    CHOL 202 (H) 12/19/2017     Lab Results   Component Value Date    TRIG 76 04/27/2019    TRIG 94 03/02/2018    TRIG 76 12/19/2017     Lab Results   Component Value Date    HDL 89 04/27/2019    HDL 96 03/02/2018    HDL 94 12/19/2017     Lab Results   Component Value Date    LDLCALC 105 (H) 04/27/2019    LDLCALC 86 03/02/2018    LDLCALC 93 12/19/2017       No results found for: LABA1C  Lab Results   Component Value Date    LDLCALC 105 (H) 04/27/2019    CREATININE 0.7 12/19/2017       Assessment / Plan: Finley Leyden was seen today for annual exam and medication refill. Diagnoses and all orders for this visit:    Acquired hypothyroidism:  Stable and well controlled  -     SYNTHROID 50 MCG tablet; Take 1 tablet by mouth daily  -     TSH without Reflex; Future    Arthritis: Differential diagnosis may include inflammatory arthritis such as RA or SLE  -     TESSA; Future  -     C-Reactive Protein; Future  -     Rheumatoid Factor; Future  -     Sedimentation Rate; Future  -     Discussed use of NSAIDS. Her pain is not continuous; recommended PRN use of OTC NSAID. Patient will keep PCP informed via MyChart    Postmenopausal:  Stable and well controlled wih ERT. -     Discussed possibility of slow taper off, as potential risks of long term ERT discussed. She would consider    Health care maintenance  -     Zoster [facility]); Future    Need for shingles vaccine  -     Zoster [facility]); Future      Call or go to ED immediately if symptoms worsen or persist.    Follow Up:  Return 1)F/U based on test outcome, for 2) F/U 11  months check up, medication refills. , or sooner if necessary. Educational materials and/or home exercises printed for patient's review and were included in patient instructions on his/her After Visit Summary and given to patient at the end of visit. Counseled regarding above diagnosis, including possible risks and complications,  especially if left uncontrolled. Counseled regarding the possible side effects, risks, benefits and alternatives to treatment; patient and/or guardian verbalizes understanding, agrees, feels comfortable with and wishes to proceed with above treatment plan. Advised patient to call with any new medication issues, and read all Rx info from pharmacy to assure aware of all possible risks and side effects of medication before taking. Reviewed age and gender appropriate health screening exams and vaccinations.   Advised patient regarding

## 2019-06-03 LAB — ANTI-NUCLEAR ANTIBODY (ANA): NEGATIVE

## 2019-09-27 ENCOUNTER — E-VISIT (OUTPATIENT)
Dept: INTERNAL MEDICINE CLINIC | Age: 59
End: 2019-09-27
Payer: COMMERCIAL

## 2019-09-27 DIAGNOSIS — J01.90 ACUTE SINUSITIS, RECURRENCE NOT SPECIFIED, UNSPECIFIED LOCATION: Primary | ICD-10-CM

## 2019-09-27 PROCEDURE — 99444 PR PHYSICIAN ONLINE EVALUATION & MANAGEMENT SERVICE: CPT | Performed by: INTERNAL MEDICINE

## 2019-09-27 RX ORDER — AZITHROMYCIN 250 MG/1
TABLET, FILM COATED ORAL
Qty: 1 PACKET | Refills: 0 | Status: SHIPPED | OUTPATIENT
Start: 2019-09-27 | End: 2019-12-17 | Stop reason: ALTCHOICE

## 2019-09-27 ASSESSMENT — LIFESTYLE VARIABLES: SMOKING_STATUS: NO, I'VE NEVER SMOKED

## 2019-10-18 ENCOUNTER — TELEPHONE (OUTPATIENT)
Dept: FAMILY MEDICINE CLINIC | Age: 59
End: 2019-10-18

## 2019-10-28 ENCOUNTER — OFFICE VISIT (OUTPATIENT)
Dept: FAMILY MEDICINE CLINIC | Age: 59
End: 2019-10-28
Payer: COMMERCIAL

## 2019-10-28 ENCOUNTER — HOSPITAL ENCOUNTER (OUTPATIENT)
Age: 59
Discharge: HOME | End: 2019-10-30
Payer: COMMERCIAL

## 2019-10-28 VITALS
OXYGEN SATURATION: 98 % | HEIGHT: 62 IN | HEART RATE: 65 BPM | TEMPERATURE: 98 F | SYSTOLIC BLOOD PRESSURE: 130 MMHG | RESPIRATION RATE: 14 BRPM | BODY MASS INDEX: 21.16 KG/M2 | DIASTOLIC BLOOD PRESSURE: 78 MMHG | WEIGHT: 115 LBS

## 2019-10-28 DIAGNOSIS — G50.0 TRIGEMINAL NEURALGIA OF LEFT SIDE OF FACE: Primary | ICD-10-CM

## 2019-10-28 DIAGNOSIS — G50.0 TRIGEMINAL NEURALGIA OF LEFT SIDE OF FACE: ICD-10-CM

## 2019-10-28 LAB
BASOPHILS ABSOLUTE: 0.02 E9/L (ref 0–0.2)
BASOPHILS RELATIVE PERCENT: 0.4 % (ref 0–2)
EOSINOPHILS ABSOLUTE: 0.2 E9/L (ref 0.05–0.5)
EOSINOPHILS RELATIVE PERCENT: 3.8 % (ref 0–6)
HCT VFR BLD CALC: 41.8 % (ref 34–48)
HEMOGLOBIN: 13.3 G/DL (ref 11.5–15.5)
IMMATURE GRANULOCYTES #: 0.01 E9/L
IMMATURE GRANULOCYTES %: 0.2 % (ref 0–5)
LYMPHOCYTES ABSOLUTE: 1.68 E9/L (ref 1.5–4)
LYMPHOCYTES RELATIVE PERCENT: 31.8 % (ref 20–42)
MCH RBC QN AUTO: 29.7 PG (ref 26–35)
MCHC RBC AUTO-ENTMCNC: 31.8 % (ref 32–34.5)
MCV RBC AUTO: 93.3 FL (ref 80–99.9)
MONOCYTES ABSOLUTE: 0.49 E9/L (ref 0.1–0.95)
MONOCYTES RELATIVE PERCENT: 9.3 % (ref 2–12)
NEUTROPHILS ABSOLUTE: 2.88 E9/L (ref 1.8–7.3)
NEUTROPHILS RELATIVE PERCENT: 54.5 % (ref 43–80)
PDW BLD-RTO: 14 FL (ref 11.5–15)
PLATELET # BLD: 259 E9/L (ref 130–450)
PMV BLD AUTO: 10.4 FL (ref 7–12)
RBC # BLD: 4.48 E12/L (ref 3.5–5.5)
WBC # BLD: 5.3 E9/L (ref 4.5–11.5)

## 2019-10-28 PROCEDURE — 85025 COMPLETE CBC W/AUTO DIFF WBC: CPT

## 2019-10-28 PROCEDURE — 99213 OFFICE O/P EST LOW 20 MIN: CPT | Performed by: NURSE PRACTITIONER

## 2019-10-28 PROCEDURE — 85651 RBC SED RATE NONAUTOMATED: CPT

## 2019-10-28 RX ORDER — GABAPENTIN 300 MG/1
300 CAPSULE ORAL NIGHTLY
Qty: 30 CAPSULE | Refills: 0 | Status: SHIPPED | OUTPATIENT
Start: 2019-10-28 | End: 2020-04-21 | Stop reason: ALTCHOICE

## 2019-10-28 RX ORDER — PREDNISONE 10 MG/1
TABLET ORAL
Qty: 21 TABLET | Refills: 0 | Status: SHIPPED | OUTPATIENT
Start: 2019-10-28 | End: 2020-04-21 | Stop reason: ALTCHOICE

## 2019-10-28 ASSESSMENT — ENCOUNTER SYMPTOMS
EYES NEGATIVE: 1
ALLERGIC/IMMUNOLOGIC NEGATIVE: 1
GASTROINTESTINAL NEGATIVE: 1
RESPIRATORY NEGATIVE: 1

## 2019-10-29 LAB — SEDIMENTATION RATE, ERYTHROCYTE: 0 MM/HR (ref 0–20)

## 2019-11-07 ASSESSMENT — ENCOUNTER SYMPTOMS
BACK PAIN: 0
NAUSEA: 0
SHORTNESS OF BREATH: 0
COUGH: 0
DIARRHEA: 0
BLOOD IN STOOL: 0
CONSTIPATION: 0
VOMITING: 0
SORE THROAT: 0
ABDOMINAL PAIN: 0
WHEEZING: 0

## 2019-11-08 ENCOUNTER — HOSPITAL ENCOUNTER (OUTPATIENT)
Age: 59
Discharge: HOME | End: 2019-11-10
Payer: COMMERCIAL

## 2019-11-08 ENCOUNTER — OFFICE VISIT (OUTPATIENT)
Dept: FAMILY MEDICINE CLINIC | Age: 59
End: 2019-11-08
Payer: COMMERCIAL

## 2019-11-08 VITALS
HEIGHT: 62 IN | SYSTOLIC BLOOD PRESSURE: 120 MMHG | WEIGHT: 115 LBS | TEMPERATURE: 97.6 F | HEART RATE: 72 BPM | DIASTOLIC BLOOD PRESSURE: 70 MMHG | OXYGEN SATURATION: 98 % | BODY MASS INDEX: 21.16 KG/M2

## 2019-11-08 DIAGNOSIS — G50.0 TRIGEMINAL NEURALGIA OF LEFT SIDE OF FACE: ICD-10-CM

## 2019-11-08 DIAGNOSIS — G50.0 TRIGEMINAL NEURALGIA OF LEFT SIDE OF FACE: Primary | ICD-10-CM

## 2019-11-08 LAB
ANION GAP SERPL CALCULATED.3IONS-SCNC: 16 MMOL/L (ref 7–16)
BUN BLDV-MCNC: 14 MG/DL (ref 8–23)
CALCIUM SERPL-MCNC: 10.4 MG/DL (ref 8.6–10.2)
CHLORIDE BLD-SCNC: 97 MMOL/L (ref 98–107)
CO2: 25 MMOL/L (ref 22–29)
CREAT SERPL-MCNC: 0.6 MG/DL (ref 0.5–1)
GFR AFRICAN AMERICAN: >60
GFR NON-AFRICAN AMERICAN: >60 ML/MIN/1.73
GLUCOSE BLD-MCNC: 110 MG/DL (ref 74–99)
POTASSIUM SERPL-SCNC: 4.3 MMOL/L (ref 3.5–5)
SODIUM BLD-SCNC: 138 MMOL/L (ref 132–146)

## 2019-11-08 PROCEDURE — 99213 OFFICE O/P EST LOW 20 MIN: CPT | Performed by: FAMILY MEDICINE

## 2019-11-08 PROCEDURE — 80048 BASIC METABOLIC PNL TOTAL CA: CPT

## 2019-11-08 PROCEDURE — 36415 COLL VENOUS BLD VENIPUNCTURE: CPT | Performed by: FAMILY MEDICINE

## 2019-11-08 RX ORDER — CARBAMAZEPINE 100 MG/1
100 CAPSULE, EXTENDED RELEASE ORAL 2 TIMES DAILY
Qty: 60 CAPSULE | Refills: 2 | Status: SHIPPED
Start: 2019-11-08 | End: 2020-04-21

## 2019-11-16 ENCOUNTER — HOSPITAL ENCOUNTER (OUTPATIENT)
Dept: MRI IMAGING | Age: 59
Discharge: HOME | End: 2019-11-18
Payer: COMMERCIAL

## 2019-11-16 DIAGNOSIS — G50.0 TRIGEMINAL NEURALGIA OF LEFT SIDE OF FACE: ICD-10-CM

## 2019-11-16 PROCEDURE — A9579 GAD-BASE MR CONTRAST NOS,1ML: HCPCS | Performed by: RADIOLOGY

## 2019-11-16 PROCEDURE — 70553 MRI BRAIN STEM W/O & W/DYE: CPT

## 2019-11-16 PROCEDURE — 6360000004 HC RX CONTRAST MEDICATION: Performed by: RADIOLOGY

## 2019-11-16 RX ADMIN — GADOTERIDOL 10 ML: 279.3 INJECTION, SOLUTION INTRAVENOUS at 13:21

## 2019-12-08 PROBLEM — G50.0 TRIGEMINAL NEURALGIA: Status: ACTIVE | Noted: 2019-12-08

## 2020-02-19 ENCOUNTER — TELEPHONE (OUTPATIENT)
Dept: FAMILY MEDICINE CLINIC | Age: 60
End: 2020-02-19

## 2020-02-19 NOTE — TELEPHONE ENCOUNTER
[facility] requesting ok to change Synthroid D.A. W. to the generic form due to high copay issues.       [facility]. [address] [address] [facility]

## 2020-02-20 NOTE — TELEPHONE ENCOUNTER
Unfortunately, the change has to be made whether I think it is okay or not. The choice is less than optimal medication or no medication. ..so some is better than none. ..

## 2020-03-23 ENCOUNTER — TELEPHONE (OUTPATIENT)
Dept: FAMILY MEDICINE CLINIC | Age: 60
End: 2020-03-23

## 2020-03-23 NOTE — TELEPHONE ENCOUNTER
Patient called and said that her daughter tested positive for COVID-19. Her work told her to stay home for 14 days.     This is just an [place]

## 2020-04-21 ENCOUNTER — HOSPITAL ENCOUNTER (OUTPATIENT)
Age: 60
Discharge: HOME | End: 2020-04-23
Payer: COMMERCIAL

## 2020-04-21 ENCOUNTER — OFFICE VISIT (OUTPATIENT)
Dept: PRIMARY CARE CLINIC | Age: 60
End: 2020-04-21
Payer: COMMERCIAL

## 2020-04-21 ENCOUNTER — HOSPITAL ENCOUNTER (OUTPATIENT)
Dept: GENERAL RADIOLOGY | Age: 60
Discharge: HOME | End: 2020-04-23
Payer: COMMERCIAL

## 2020-04-21 VITALS
OXYGEN SATURATION: 98 % | DIASTOLIC BLOOD PRESSURE: 80 MMHG | BODY MASS INDEX: 20.98 KG/M2 | HEIGHT: 62 IN | SYSTOLIC BLOOD PRESSURE: 124 MMHG | TEMPERATURE: 97.8 F | WEIGHT: 114 LBS | HEART RATE: 78 BPM

## 2020-04-21 PROBLEM — R05.9 COUGH: Status: ACTIVE | Noted: 2020-04-21

## 2020-04-21 PROBLEM — Z20.822 SUSPECTED COVID-19 VIRUS INFECTION: Status: ACTIVE | Noted: 2020-04-21

## 2020-04-21 PROBLEM — R53.83 OTHER FATIGUE: Status: ACTIVE | Noted: 2020-04-21

## 2020-04-21 PROCEDURE — 71046 X-RAY EXAM CHEST 2 VIEWS: CPT

## 2020-04-21 PROCEDURE — 99213 OFFICE O/P EST LOW 20 MIN: CPT | Performed by: FAMILY MEDICINE

## 2020-04-21 RX ORDER — METHYLPREDNISOLONE 4 MG/1
TABLET ORAL
Qty: 21 TABLET | Refills: 0 | Status: SHIPPED | OUTPATIENT
Start: 2020-04-21 | End: 2020-04-27

## 2020-04-21 RX ORDER — LEVOTHYROXINE SODIUM 0.05 MG/1
50 TABLET ORAL DAILY
Qty: 90 TABLET | Refills: 3 | Status: SHIPPED | OUTPATIENT
Start: 2020-04-21 | End: 2021-04-01 | Stop reason: SDUPTHER

## 2020-04-28 ENCOUNTER — OFFICE VISIT (OUTPATIENT)
Dept: PRIMARY CARE CLINIC | Age: 60
End: 2020-04-28
Payer: COMMERCIAL

## 2020-04-28 ENCOUNTER — HOSPITAL ENCOUNTER (OUTPATIENT)
Age: 60
Discharge: HOME | End: 2020-04-30
Payer: COMMERCIAL

## 2020-04-28 VITALS
HEIGHT: 62 IN | OXYGEN SATURATION: 98 % | BODY MASS INDEX: 20.8 KG/M2 | DIASTOLIC BLOOD PRESSURE: 84 MMHG | WEIGHT: 113 LBS | TEMPERATURE: 97.5 F | HEART RATE: 66 BPM | SYSTOLIC BLOOD PRESSURE: 128 MMHG

## 2020-04-28 LAB — SARS-COV-2, NAAT: NOT DETECTED

## 2020-04-28 PROCEDURE — 99213 OFFICE O/P EST LOW 20 MIN: CPT | Performed by: FAMILY MEDICINE

## 2020-04-28 PROCEDURE — U0002 COVID-19 LAB TEST NON-CDC: HCPCS

## 2020-05-01 ENCOUNTER — HOSPITAL ENCOUNTER (OUTPATIENT)
Dept: GENERAL RADIOLOGY | Age: 60
Discharge: HOME | End: 2020-05-03
Payer: COMMERCIAL

## 2020-05-01 PROCEDURE — 77063 BREAST TOMOSYNTHESIS BI: CPT

## 2020-05-21 PROBLEM — R05.9 COUGH: Status: RESOLVED | Noted: 2020-04-21 | Resolved: 2020-05-21

## 2020-11-02 LAB
ALBUMIN SERPL-MCNC: NORMAL G/DL
ALP BLD-CCNC: NORMAL U/L
ALT SERPL-CCNC: NORMAL U/L
ANION GAP SERPL CALCULATED.3IONS-SCNC: NORMAL MMOL/L
AST SERPL-CCNC: NORMAL U/L
BASOPHILS ABSOLUTE: NORMAL
BASOPHILS ABSOLUTE: NORMAL
BASOPHILS RELATIVE PERCENT: NORMAL
BASOPHILS RELATIVE PERCENT: NORMAL
BILIRUB SERPL-MCNC: NORMAL MG/DL
BUN BLDV-MCNC: NORMAL MG/DL
CALCIUM SERPL-MCNC: NORMAL MG/DL
CHLORIDE BLD-SCNC: NORMAL MMOL/L
CO2: NORMAL
CREAT SERPL-MCNC: NORMAL MG/DL
EOSINOPHILS ABSOLUTE: NORMAL
EOSINOPHILS ABSOLUTE: NORMAL
EOSINOPHILS RELATIVE PERCENT: NORMAL
EOSINOPHILS RELATIVE PERCENT: NORMAL
GFR CALCULATED: NORMAL
GLUCOSE BLD-MCNC: NORMAL MG/DL
HCT VFR BLD CALC: NORMAL %
HCT VFR BLD CALC: NORMAL %
HEMOGLOBIN: NORMAL
HEMOGLOBIN: NORMAL
INR BLD: NORMAL
LYMPHOCYTES ABSOLUTE: NORMAL
LYMPHOCYTES ABSOLUTE: NORMAL
LYMPHOCYTES RELATIVE PERCENT: NORMAL
LYMPHOCYTES RELATIVE PERCENT: NORMAL
MCH RBC QN AUTO: NORMAL PG
MCH RBC QN AUTO: NORMAL PG
MCHC RBC AUTO-ENTMCNC: NORMAL G/DL
MCHC RBC AUTO-ENTMCNC: NORMAL G/DL
MCV RBC AUTO: NORMAL FL
MCV RBC AUTO: NORMAL FL
MONOCYTES ABSOLUTE: NORMAL
MONOCYTES ABSOLUTE: NORMAL
MONOCYTES RELATIVE PERCENT: NORMAL
MONOCYTES RELATIVE PERCENT: NORMAL
NEUTROPHILS ABSOLUTE: NORMAL
NEUTROPHILS ABSOLUTE: NORMAL
NEUTROPHILS RELATIVE PERCENT: NORMAL
NEUTROPHILS RELATIVE PERCENT: NORMAL
PLATELET # BLD: NORMAL 10*3/UL
PLATELET # BLD: NORMAL 10*3/UL
PMV BLD AUTO: NORMAL FL
PMV BLD AUTO: NORMAL FL
POTASSIUM SERPL-SCNC: NORMAL MMOL/L
PROTIME: NORMAL
RBC # BLD: NORMAL 10*6/UL
RBC # BLD: NORMAL 10*6/UL
SODIUM BLD-SCNC: NORMAL MMOL/L
TOTAL PROTEIN: NORMAL
WBC # BLD: NORMAL 10*3/UL
WBC # BLD: NORMAL 10*3/UL

## 2020-11-05 ASSESSMENT — ENCOUNTER SYMPTOMS
SORE THROAT: 0
DIARRHEA: 0
SHORTNESS OF BREATH: 0
BLOOD IN STOOL: 0
CONSTIPATION: 0
WHEEZING: 0
ABDOMINAL PAIN: 0
VOMITING: 0
COUGH: 0
NAUSEA: 0
BACK PAIN: 0

## 2020-11-05 NOTE — PROGRESS NOTES
Elena Caruso is a 56 y.o. female who presents today for     Chief Complaint   Patient presents with    Surgical Consult     right hip surgerical clearance, ekg and lab work already done       Pre-surgical evaluation:  Patient is here by request of Dr. Sage Cabrera (Ortho) who has upcoming right AIDA surgery scheduled with patient. Patient will be undergoing spinal anaesthesia. She has no complaints or concerns today. She is  generally healthy. Chronic medical problems include acquired hypothyroidism. These are generally controlled and stable at this time. /82 today. Most recent labs reviewed with patient and  are not remarkable. Patient does not smoke. Patient does not drink alcohol. Patient  does not use drugs. Overall doing well. Patient does not have chest pain, palpitations, shortness of breath, or orthopnea. No known heart, kidney or liver issue to date to the best of patient's knowledge, my knowledge, or of that recorded in patient's current medical record. Patient has had cardiac testing in the past including EKG, stress test, and/or catheterization. If available, these records have been reviewed today. [address]:  Patient's past medical, surgical, social and/or family history reviewed, updated in chart, and are non-contributory (unless otherwise stated). Medications and allergies also reviewed and updated in chart. Review of Systems  Review of Systems   HENT: Negative for congestion, ear pain and sore throat. Respiratory: Negative for cough, shortness of breath and wheezing. Cardiovascular: Negative for chest pain, palpitations and leg swelling. Gastrointestinal: Negative for abdominal pain, blood in stool, constipation, diarrhea, nausea and vomiting. Genitourinary: Negative for dysuria, frequency, hematuria and urgency. Musculoskeletal: Negative for back pain, myalgias and neck pain. Skin: Negative for rash.    Neurological: Negative for dizziness, weakness and time. Psychiatric:         Behavior: Behavior normal.         Thought Content: Thought content normal.         Labs:  Lab Results   Component Value Date     11/08/2019    K 4.3 11/08/2019    CL 97 11/08/2019    CO2 25 11/08/2019    BUN 14 11/08/2019    CREATININE 0.6 11/08/2019    PROT 6.4 12/19/2017    LABALBU 4.3 12/19/2017    CALCIUM 10.4 11/08/2019    GFRAA >60 11/08/2019    LABGLOM >60 11/08/2019    GLUCOSE 110 11/08/2019    AST 20 12/19/2017    ALT 17 12/19/2017    ALKPHOS 62 12/19/2017    BILITOT 0.6 12/19/2017    TSH 2.030 05/31/2019    CHOL 209 04/27/2019    TRIG 76 04/27/2019    HDL 89 04/27/2019    LDLCALC 105 04/27/2019        Lab Results   Component Value Date    CHOL 209 (H) 04/27/2019    CHOL 201 (H) 03/02/2018    CHOL 202 (H) 12/19/2017     Lab Results   Component Value Date    TRIG 76 04/27/2019    TRIG 94 03/02/2018    TRIG 76 12/19/2017     Lab Results   Component Value Date    HDL 89 04/27/2019    HDL 96 03/02/2018    HDL 94 12/19/2017     Lab Results   Component Value Date    LDLCALC 105 (H) 04/27/2019    LDLCALC 86 03/02/2018    LDLCALC 93 12/19/2017       No results found for: LABA1C  Lab Results   Component Value Date    LDLCALC 105 (H) 04/27/2019    CREATININE 0.6 11/08/2019           Assessment / Plan:      Charla Fernandez was seen today for surgical consult. Diagnoses and all orders for this visit:    Preoperative examination: Medically cleared to proceed with planned procedure    Right hip pain: Medically cleared to proceed with planned procedure    Acute sinusitis, recurrence not specified, unspecified location  -     fluticasone (FLONASE) 50 MCG/ACT nasal spray; Use 2 sprays each nostril QMWF, off other days    Acquired hypothyroidism  -     T4; Future  -     TSH without Reflex; Future           Follow Up:  Return for F/U 5 mos (4/2021) for medication refills. or sooner if necessary.       Call or go to ED immediately if symptoms worsen or persist.    Educational materials and/or home exercises printed for patient's review and were included in patient instructions on his/her AfterVisit Summary and given to patient at the end of visit. Counseled regarding above diagnosis,including possible risks and complications,  especially if left uncontrolled. Counseled regarding the possible side effects, risks, benefits and alternatives to treatment; patient and/or guardian verbalizes understanding, agrees, feels comfortable with and wishes to proceed with above treatment plan. Advised patient tocall with any new medication issues, and read all Rx info from pharmacy to assureaware of all possible risks and side effects of medication before taking. Reviewed age and gender appropriate health screening exams and vaccinations. Advisedpatient regarding importance of keeping up with recommended health maintenance andto schedule as soon as possible if overdue, as this is important in assessing forundiagnosed pathology, especially cancer, as well as protecting against potentially harmful/life threatening disease. Patient and/or guardian verbalizes understandingand agrees with above counseling, assessment and plan. All questions answered.     Mena Canales MD

## 2020-11-09 ENCOUNTER — OFFICE VISIT (OUTPATIENT)
Dept: FAMILY MEDICINE CLINIC | Age: 60
End: 2020-11-09
Payer: COMMERCIAL

## 2020-11-09 VITALS
HEART RATE: 67 BPM | TEMPERATURE: 98.2 F | OXYGEN SATURATION: 94 % | BODY MASS INDEX: 21.71 KG/M2 | DIASTOLIC BLOOD PRESSURE: 82 MMHG | RESPIRATION RATE: 16 BRPM | WEIGHT: 118 LBS | SYSTOLIC BLOOD PRESSURE: 132 MMHG | HEIGHT: 62 IN

## 2020-11-09 DIAGNOSIS — E03.9 ACQUIRED HYPOTHYROIDISM: ICD-10-CM

## 2020-11-09 LAB
T4 TOTAL: 9 MCG/DL (ref 4.5–11.7)
TSH SERPL DL<=0.05 MIU/L-ACNC: 1.54 UIU/ML (ref 0.27–4.2)

## 2020-11-09 PROCEDURE — 99214 OFFICE O/P EST MOD 30 MIN: CPT | Performed by: FAMILY MEDICINE

## 2020-11-09 RX ORDER — FLUTICASONE PROPIONATE 50 MCG
SPRAY, SUSPENSION (ML) NASAL
Qty: 2 BOTTLE | Refills: 5 | Status: SHIPPED
Start: 2020-11-09 | End: 2021-03-24

## 2020-11-09 RX ORDER — FLUTICASONE PROPIONATE 50 MCG
2 SPRAY, SUSPENSION (ML) NASAL DAILY
COMMUNITY
End: 2020-11-09 | Stop reason: SDUPTHER

## 2020-11-09 ASSESSMENT — PATIENT HEALTH QUESTIONNAIRE - PHQ9
SUM OF ALL RESPONSES TO PHQ QUESTIONS 1-9: 0
2. FEELING DOWN, DEPRESSED OR HOPELESS: 0
SUM OF ALL RESPONSES TO PHQ9 QUESTIONS 1 & 2: 0
SUM OF ALL RESPONSES TO PHQ QUESTIONS 1-9: 0
SUM OF ALL RESPONSES TO PHQ QUESTIONS 1-9: 0
1. LITTLE INTEREST OR PLEASURE IN DOING THINGS: 0

## 2021-02-16 LAB
ALBUMIN SERPL-MCNC: NORMAL G/DL
ALP BLD-CCNC: NORMAL U/L
ALT SERPL-CCNC: NORMAL U/L
ANION GAP SERPL CALCULATED.3IONS-SCNC: NORMAL MMOL/L
AST SERPL-CCNC: NORMAL U/L
BASOPHILS ABSOLUTE: NORMAL
BASOPHILS RELATIVE PERCENT: NORMAL
BILIRUB SERPL-MCNC: NORMAL MG/DL
BUN BLDV-MCNC: NORMAL MG/DL
CALCIUM SERPL-MCNC: NORMAL MG/DL
CHLORIDE BLD-SCNC: NORMAL MMOL/L
CO2: NORMAL
CREAT SERPL-MCNC: NORMAL MG/DL
EOSINOPHILS ABSOLUTE: NORMAL
EOSINOPHILS RELATIVE PERCENT: NORMAL
GFR CALCULATED: NORMAL
GLUCOSE BLD-MCNC: NORMAL MG/DL
HCT VFR BLD CALC: NORMAL %
HEMOGLOBIN: NORMAL
INR BLD: NORMAL
LYMPHOCYTES ABSOLUTE: NORMAL
LYMPHOCYTES RELATIVE PERCENT: NORMAL
MCH RBC QN AUTO: NORMAL PG
MCHC RBC AUTO-ENTMCNC: NORMAL G/DL
MCV RBC AUTO: NORMAL FL
MONOCYTES ABSOLUTE: NORMAL
MONOCYTES RELATIVE PERCENT: NORMAL
NEUTROPHILS ABSOLUTE: NORMAL
NEUTROPHILS RELATIVE PERCENT: NORMAL
PLATELET # BLD: NORMAL 10*3/UL
PMV BLD AUTO: NORMAL FL
POTASSIUM SERPL-SCNC: NORMAL MMOL/L
PROTIME: NORMAL
RBC # BLD: NORMAL 10*6/UL
SODIUM BLD-SCNC: NORMAL MMOL/L
TOTAL PROTEIN: NORMAL
WBC # BLD: NORMAL 10*3/UL

## 2021-02-18 ASSESSMENT — ENCOUNTER SYMPTOMS
NAUSEA: 0
COUGH: 0
VOMITING: 0
ABDOMINAL PAIN: 0
DIARRHEA: 0
SORE THROAT: 0
BACK PAIN: 0
CONSTIPATION: 0
SHORTNESS OF BREATH: 0
BLOOD IN STOOL: 0
WHEEZING: 0

## 2021-02-18 NOTE — PROGRESS NOTES
Zheng Peoples is a 59 y.o. female who presents today for     No chief complaint on file. Pre-surgical evaluation:  Patient is here by request of Dr. Ruthe Phalen (Ortho @ [place]) who has upcoming total left hip replacement on 3/8/21 surgery scheduled with patient. Patient will be undergoing spinal anaesthesia. She has no complaints or concerns today. She is  generally healthy. Chronic medical problems include hypothyroidism and postmenopausal.  These are generally controlled and stable at this time. /80 today. Most recent labs reviewed with patient and  are not remarkable. Patient does not smoke. Patient does not drink alcohol. Patient  does not use drugs. Overall doing well. Patient does not have chest pain, palpitations, shortness of breath, or orthopnea. No known heart, kidney or liver issue to date to the best of patient's knowledge, my knowledge, or of that recorded in patient's current medical record. Patient has had cardiac testing in the past including EKG, stress test, and/or catheterization. If available, these records have been reviewed today. [address]:  Patient's past medical, surgical, social and/or family history reviewed, updated in chart, and are non-contributory (unless otherwise stated). Medications and allergies also reviewed and updated in chart. Review of Systems  Review of Systems   HENT: Negative for congestion, ear pain and sore throat. Respiratory: Negative for cough, shortness of breath and wheezing. Cardiovascular: Negative for chest pain, palpitations and leg swelling. Worsening  of varicose veins of right leg. Getting more painful. Requesting referral to Vascular for assessment   Gastrointestinal: Negative for abdominal pain, blood in stool, constipation, diarrhea, nausea and vomiting. Genitourinary: Negative for dysuria, frequency, hematuria and urgency. Musculoskeletal: Negative for back pain, myalgias and neck pain.    Skin: Negative for rash. Neurological: Negative for dizziness, weakness and headaches. Psychiatric/Behavioral: The patient is not nervous/anxious. Physical Exam:    VS:  /80   Pulse 68   Temp 96.2 °F (35.7 °C) (Infrared)   Resp 18   Ht 5' 2\" (1.575 m)   Wt 118 lb 4.8 oz (53.7 kg)   LMP 01/01/2005   SpO2 98%   BMI 21.64 kg/m²     LAST WEIGHT:  Wt Readings from Last 3 Encounters:   02/22/21 118 lb 4.8 oz (53.7 kg)   11/09/20 118 lb (53.5 kg)   04/28/20 113 lb (51.3 kg)       BMI Readings from Last 3 Encounters:   02/22/21 21.64 kg/m²   11/09/20 21.58 kg/m²   04/28/20 20.67 kg/m²       Physical Exam  Constitutional:       General: She is not in acute distress. Appearance: She is well-developed. She is not diaphoretic. HENT:      Head: Normocephalic and atraumatic. Right Ear: External ear normal.      Left Ear: External ear normal.      Mouth/Throat:      Pharynx: No oropharyngeal exudate. Eyes:      General: No scleral icterus. Right eye: No discharge. Conjunctiva/sclera: Conjunctivae normal.      Pupils: Pupils are equal, round, and reactive to light. Neck:      Musculoskeletal: Normal range of motion and neck supple. Thyroid: No thyromegaly. Cardiovascular:      Rate and Rhythm: Normal rate and regular rhythm. Heart sounds: Normal heart sounds. No murmur. Comments: Prominent varicosities of right LE  Pulmonary:      Effort: Pulmonary effort is normal. No respiratory distress. Breath sounds: No stridor. No wheezing or rales. Chest:      Chest wall: No tenderness. Abdominal:      General: Bowel sounds are normal. There is no distension. Palpations: Abdomen is soft. There is no mass. Tenderness: There is no abdominal tenderness. There is no guarding. Musculoskeletal: Normal range of motion. General: No tenderness. Lymphadenopathy:      Cervical: No cervical adenopathy. Skin:     General: Skin is warm and dry.       Coloration: Skin is not pale. Findings: No erythema or rash. Neurological:      Mental Status: She is alert and oriented to person, place, and time. Psychiatric:         Behavior: Behavior normal.         Thought Content: Thought content normal.         Labs:  Lab Results   Component Value Date     11/08/2019    K 4.3 11/08/2019    CL 97 11/08/2019    CO2 25 11/08/2019    BUN 14 11/08/2019    CREATININE 0.6 11/08/2019    PROT 6.4 12/19/2017    LABALBU 4.3 12/19/2017    CALCIUM 10.4 11/08/2019    GFRAA >60 11/08/2019    LABGLOM >60 11/08/2019    GLUCOSE 110 11/08/2019    AST 20 12/19/2017    ALT 17 12/19/2017    ALKPHOS 62 12/19/2017    BILITOT 0.6 12/19/2017    TSH 1.540 11/09/2020    CHOL 209 04/27/2019    TRIG 76 04/27/2019    HDL 89 04/27/2019    LDLCALC 105 04/27/2019        Lab Results   Component Value Date    CHOL 209 (H) 04/27/2019    CHOL 201 (H) 03/02/2018    CHOL 202 (H) 12/19/2017     Lab Results   Component Value Date    TRIG 76 04/27/2019    TRIG 94 03/02/2018    TRIG 76 12/19/2017     Lab Results   Component Value Date    HDL 89 04/27/2019    HDL 96 03/02/2018    HDL 94 12/19/2017     Lab Results   Component Value Date    LDLCALC 105 (H) 04/27/2019    LDLCALC 86 03/02/2018    LDLCALC 93 12/19/2017       No results found for: LABA1C  Lab Results   Component Value Date    LDLCALC 105 (H) 04/27/2019    CREATININE 0.6 11/08/2019           Assessment / Plan:      Carmine Warren was seen today for other and leg pain. Diagnoses and all orders for this visit:    Primary osteoarthritis of left hip: clear for planned procedure     Preoperative examination: clear for planned procedure    Varicose veins of right lower extremity with pain:  Symptoms are worsening  -     [facility] V[facility] Surgery           Follow Up:  Return for Keep scheduled appointment(s). or sooner if necessary.       Call or go to ED immediately if symptoms worsen or persist.    Educational materials and/or home exercises (varicose veins) printed for patient's review and were included in patient instructions on his/her AfterVisit Summary and given to patient at the end of visit. Counseled regarding above diagnosis,including possible risks and complications,  especially if left uncontrolled. Counseled regarding the possible side effects, risks, benefits and alternatives to treatment; patient and/or guardian verbalizes understanding, agrees, feels comfortable with and wishes to proceed with above treatment plan. Advised patient tocall with any new medication issues, and read all Rx info from pharmacy to assureaware of all possible risks and side effects of medication before taking. Reviewed age and gender appropriate health screening exams and vaccinations. Advisedpatient regarding importance of keeping up with recommended health maintenance andto schedule as soon as possible if overdue, as this is important in assessing forundiagnosed pathology, especially cancer, as well as protecting against potentially harmful/life threatening disease. Patient and/or guardian verbalizes understandingand agrees with above counseling, assessment and plan. All questions answered.     Kamini Merida MD

## 2021-02-22 ENCOUNTER — TELEPHONE (OUTPATIENT)
Dept: VASCULAR SURGERY | Age: 61
End: 2021-02-22

## 2021-02-22 ENCOUNTER — OFFICE VISIT (OUTPATIENT)
Dept: FAMILY MEDICINE CLINIC | Age: 61
End: 2021-02-22
Payer: COMMERCIAL

## 2021-02-22 VITALS
OXYGEN SATURATION: 98 % | HEIGHT: 62 IN | TEMPERATURE: 96.2 F | HEART RATE: 68 BPM | BODY MASS INDEX: 21.77 KG/M2 | RESPIRATION RATE: 18 BRPM | WEIGHT: 118.3 LBS | DIASTOLIC BLOOD PRESSURE: 80 MMHG | SYSTOLIC BLOOD PRESSURE: 112 MMHG

## 2021-02-22 DIAGNOSIS — I83.811 VARICOSE VEINS OF RIGHT LOWER EXTREMITY WITH PAIN: ICD-10-CM

## 2021-02-22 DIAGNOSIS — Z01.818 PREOPERATIVE EXAMINATION: ICD-10-CM

## 2021-02-22 DIAGNOSIS — M16.12 PRIMARY OSTEOARTHRITIS OF LEFT HIP: Primary | ICD-10-CM

## 2021-02-22 PROCEDURE — 99214 OFFICE O/P EST MOD 30 MIN: CPT | Performed by: FAMILY MEDICINE

## 2021-02-22 SDOH — ECONOMIC STABILITY: TRANSPORTATION INSECURITY
IN THE PAST 12 MONTHS, HAS LACK OF TRANSPORTATION KEPT YOU FROM MEETINGS, WORK, OR FROM GETTING THINGS NEEDED FOR DAILY LIVING?: NOT ASKED

## 2021-02-22 SDOH — ECONOMIC STABILITY: TRANSPORTATION INSECURITY
IN THE PAST 12 MONTHS, HAS THE LACK OF TRANSPORTATION KEPT YOU FROM MEDICAL APPOINTMENTS OR FROM GETTING MEDICATIONS?: NOT ASKED

## 2021-02-22 ASSESSMENT — PATIENT HEALTH QUESTIONNAIRE - PHQ9: SUM OF ALL RESPONSES TO PHQ QUESTIONS 1-9: 0

## 2021-02-22 NOTE — PATIENT INSTRUCTIONS
Patient Education        Varicose Veins: Care Instructions  Your Care Instructions  Varicose veins are twisted, enlarged veins near the surface of the skin. They develop most often in the legs and ankles. Some people may be more likely than others to get varicose veins because of aging or hormone changes or because a parent has them. Being overweight or pregnant can make varicose veins worse. Jobs that require standing for long periods of time also can make them worse. Follow-up care is a key part of your treatment and safety. Be sure to make and go to all appointments, and call your doctor if you are having problems. It's also a good idea to know your test results and keep a list of the medicines you take. How can you care for yourself at home? · Wear compression stockings during the day to help relieve symptoms. They improve blood flow and are the main treatment for varicose veins. Talk to your doctor about which ones to get and where to get them. · Prop up your legs at or above the level of your heart when possible. This helps keep the blood from pooling in your lower legs and improves blood flow to the rest of your body. · Avoid sitting and standing for long periods. This puts added stress on your veins. · Get regular exercise, and control your weight. Walk, bicycle, or swim to improve blood flow in your legs. · If you bump your leg so hard that you know it is likely to bruise, prop up your leg and put ice or a cold pack on the area for 10 to 20 minutes at a time. Try to do this every 1 to 2 hours for the next 3 days (when you are awake) or until the swelling goes down. Put a thin cloth between the ice and your skin. · If you cut or scratch the skin over a vein, it may bleed a lot. Prop up your leg and apply firm pressure with a clean bandage over the site of the bleeding. Continue to apply pressure for a full 15 minutes. Do not check sooner to see if the bleeding has stopped.  If the bleeding has not stopped after 15 minutes, apply pressure again for another 15 minutes. You can repeat this up to 3 times for a total of 45 minutes. If you have a blood clot in a varicose vein, you may have tenderness and swelling over the vein. The vein may feel firm. Be sure to call your doctor right away if you have these symptoms. If your doctor has told you how to care for the clot, follow his or her instructions. Care may include the following:  · Prop up your leg and apply heat with a warm, damp cloth or a heating pad set on low (put a towel or cloth between your leg and the heating pad to prevent burns). · Ask your doctor if you can take an over-the-counter pain medicine, such as acetaminophen (Tylenol), ibuprofen (Advil, Motrin), or naproxen (Aleve). Be safe with medicines. Read and follow all instructions on the label. When should you call for help? Call 911 anytime you think you may need emergency care. For example, call if:    · You have sudden chest pain and shortness of breath, or you cough up blood. Call your doctor now or seek immediate medical care if:    · You have signs of a blood clot, such as:  ? Pain in your calf, back of the knee, thigh, or groin. ? Redness and swelling in your leg or groin.     · A varicose vein begins to bleed and you cannot stop it.     · You have a tender lump in your leg.     · You get an open sore. Watch closely for changes in your health, and be sure to contact your doctor if:    · Your varicose vein symptoms do not improve with home treatment. Where can you learn more? Go to [email or web address]://[email or web address]. org and sign in to your [facility] account. Enter K079 in the [employer] box to learn more about \"Varicose Veins: Care Instructions. \"     If you do not have an account, please click on the \"Sign Up Now\" link. Current as of: March 4, 2020               Content Version: 12.6  © 8186-3310 [employer].    Care instructions adapted under license by [facility] ([facility]). If you have questions about a medical condition or this instruction, always ask your healthcare professional. [place] [address] any warranty or liability for your use of this information.

## 2021-03-24 ENCOUNTER — OFFICE VISIT (OUTPATIENT)
Dept: VASCULAR SURGERY | Age: 61
End: 2021-03-24
Payer: COMMERCIAL

## 2021-03-24 VITALS — SYSTOLIC BLOOD PRESSURE: 126 MMHG | DIASTOLIC BLOOD PRESSURE: 78 MMHG

## 2021-03-24 DIAGNOSIS — I83.893 SYMPTOMATIC VARICOSE VEINS OF BOTH LOWER EXTREMITIES: ICD-10-CM

## 2021-03-24 DIAGNOSIS — I83.811 VARICOSE VEINS OF RIGHT LOWER EXTREMITY WITH PAIN: Primary | Chronic | ICD-10-CM

## 2021-03-24 DIAGNOSIS — M79.89 LEG SWELLING: Primary | ICD-10-CM

## 2021-03-24 DIAGNOSIS — I87.2 CHRONIC VENOUS INSUFFICIENCY: ICD-10-CM

## 2021-03-24 PROCEDURE — 99203 OFFICE O/P NEW LOW 30 MIN: CPT | Performed by: SURGERY

## 2021-03-24 RX ORDER — OMEPRAZOLE 20 MG/1
20 CAPSULE, DELAYED RELEASE ORAL DAILY
COMMUNITY
End: 2021-10-13

## 2021-03-24 RX ORDER — ACETAMINOPHEN 500 MG
500 TABLET ORAL EVERY 6 HOURS PRN
COMMUNITY
End: 2021-10-13

## 2021-03-24 RX ORDER — ASPIRIN 81 MG/1
81 TABLET ORAL DAILY
COMMUNITY
End: 2021-10-13

## 2021-03-24 NOTE — PROGRESS NOTES
Vascular Surgery Outpatient Consultation        Reason for Consult:    Chief Complaint   Patient presents with    Surgical Consult     Varicose veins. Requesting Physician:  Librado Tinoco MD  [address] [place] [id number] [address]    Chief Complaint   Patient presents with    Surgical Consult     Varicose veins. HISTORY OF PRESENT ILLNESS:                The patient is a 59 y.o. female who is referred for evaluation of with a history of varicose veins of the bilateral lower extremities. Right greater than left. She denies any history of blood clots or bleeding disorders. She is recently had bilateral anterior hips done. And is doing well. Her most recent one was the left side. She denies any chest pain or shortness of breath. She describes an intermittent pain and discomfort in her right lower extremity worse at the end of the day. She has intermittently worn compression stockings. Pains approxi about a 4. Mostly of the shin and she is noted some varicose veins that are fairly superficial on the right lower extremity. .    Past Medical History:        Diagnosis Date    Acquired Hypothyroidism     Kidney cysts     Liver cyst     Osteoarthritis     Postmenopausal     Shingles 2012    Thyroid disease     Trigeminal neuralgia 12/8/2019     Past Surgical History:        Procedure Laterality Date    COLONOSCOPY  2008    HYSTERECTOMY, TOTAL ABDOMINAL      JOINT REPLACEMENT Bilateral     KNEE ARTHROSCOPY Right 2017    OR COLONOSCOPY W/BIOPSY SINGLE/MULTIPLE N/A 8/6/2018    COLONOSCOPY WITH BIOPSY performed by Jimy Mahoney MD at [address]     Current Medications:   Prior to Admission medications    Medication Sig Start Date End Date Taking?  Authorizing Provider   acetaminophen (TYLENOL) 500 MG tablet Take 500 mg by mouth every 6 hours as needed for Pain   Yes Historical Provider, MD   aspirin EC 81 MG EC tablet Take 81 mg by mouth daily   Yes Historical Provider, MD   omeprazole (PRILOSEC) 20 MG delayed release capsule Take 20 mg by mouth daily   Yes Historical Provider, MD   levothyroxine (SYNTHROID) 50 MCG tablet Take 1 tablet by mouth daily 4/21/20 4/16/21 Yes Iban Escobedo MD   Multiple Vitamins-Minerals (THERAPEUTIC MULTIVITAMIN-MINERALS) tablet Take 1 tablet by mouth daily   Yes Historical Provider, MD   Ascorbic Acid (VITAMIN C) 500 MG CAPS Take 2 tablets by mouth daily    Yes Historical Provider, MD   calcium citrate-vitamin D (CITRICAL + D) 315-250 MG-UNIT TABS per tablet Take 1 tablet by mouth 2 times daily (with meals)   Yes Historical Provider, MD   senna (SENOKOT) 8.6 MG tablet Take 2 tablets by mouth daily Indications: Constipation    Yes Historical Provider, MD   estradiol (ESTRACE) 1 MG tablet Take 1 mg by mouth daily    Yes Historical Provider, MD     Allergies:  Diclofenac sodium and Sulfa antibiotics    Social History     Socioeconomic History    Marital status:      Spouse name: Not on file    Number of children: Not on file    Years of education: Not on file    Highest education level: Not on file   Occupational History    Not on file   Social Needs    Financial resource strain: Not hard at all   Dr. Z insecurity     Worry: Never true     Inability: Never true   [employer] needs     Medical: Not on file     Non-medical: Not on file   Tobacco Use    Smoking status: Never Smoker    Smokeless tobacco: Never Used   Substance and Sexual Activity    Alcohol use:  Yes     Alcohol/week: 5.0 standard drinks     Types: 5 Glasses of wine per week     Comment: 3/1/17:  enjoys going to wineries on weekends with     Drug use: No    Sexual activity: Not Currently     Partners: Male     Comment: 3/1/7:  no secondary to impotence   Lifestyle    Physical activity     Days per week: Not on file     Minutes per session: Not on file    Stress: Not on file   Relationships    Social connections     Talks on phone: Not on file     Gets together: Not on file     Attends [employer]ism service: Not on file     Active member of club or organization: Not on file     Attends meetings of clubs or organizations: Not on file     Relationship status: Not on file    Intimate partner violence     Fear of current or ex partner: Not on file     Emotionally abused: Not on file     Physically abused: Not on file     Forced sexual activity: Not on file   Other Topics Concern    Not on file   Social History Narrative    Not on file        Family History   Problem Relation Age of Onset    Cancer Mother         bone, esophagus    Arthritis Mother     Heart Disease Father     Stroke Father     High Blood Pressure Father     High Cholesterol Father     Cancer Paternal Aunt         breast    Cancer Paternal Uncle         prostate    Stroke Paternal Grandfather     Cancer Paternal Aunt         breast       REVIEW OF SYSTEMS (New symptoms):    Eyes:      Blurred vision:  No [x]/Yes []               Diplopia:   No [x]/Yes []               Vision loss:       No [x]/Yes []   Ears, nose, throat:             Hearing loss:    No [x]/Yes []      Vertigo:   No [x]/Yes []                       Swallowing problem:  No [x]/Yes []               Nose bleeds:   No [x]/Yes []      Voice hoarseness:  No [x]/Yes []  Respiratory:             Cough:   No [x]/Yes []      Pleuritic chest pain:  No [x]/Yes []                        Dyspnea:   No [x]/Yes []      Wheezing:   No [x]/Yes []  Cardiovascular:             Angina:   No [x]/Yes []      Palpitations:   No [x]/Yes []          Claudication:    No [x]/Yes []      Leg swelling:   No [x]/Yes []  Gastrointestinal:             Nausea or vomiting:  No [x]/Yes []               Abdominal pain:  No [x]/Yes []                     Intestinal bleeding: No [x]/Yes []  Musculoskeletal:             Leg pain:   No [x]/Yes []      Back pain:   No [x]/Yes []                    Weakness:   No [x]/Yes []  Neurologic:             Numbness:   No pain - Primary (Chronic)            #1 varicose veins with pain swelling and tenderness. At this point we will plan on giving her prescription grade compression stockings. In order bilateral standing venous duplex examinations. She has no history of blood clots or bleeding disorders my suspicion of DVT is low. She will call if is any questions or concern such as redness, swelling, pain discomfort or chest pain. No follow-ups on file.

## 2021-04-01 ASSESSMENT — ENCOUNTER SYMPTOMS
HEARTBURN: 0
BACK PAIN: 0
SPUTUM PRODUCTION: 0
NAUSEA: 0
ABDOMINAL PAIN: 0
WHEEZING: 0
DIARRHEA: 0
BLOOD IN STOOL: 0
CONSTIPATION: 1
SORE THROAT: 0
COUGH: 0
VOMITING: 0
BLURRED VISION: 0
SHORTNESS OF BREATH: 0
ORTHOPNEA: 0
DOUBLE VISION: 0

## 2021-04-01 NOTE — PROGRESS NOTES
Gena Worthy is a 59 y.o. female who presents today for     Chief Complaint   Patient presents with    Medication Refill     5 month follow up; mammogram order and estridiol prescription. She is treated for hypothyroidism and Postmenopausal symptoms. Primary Osteoarthritis, bilateral hips  She is S/P bilateral THR per Dr. Sadler [facility]). She is doing very well        Hypothyroidism:  Patient with history chronic hypothyroidism. This is  generally controlled on current medication regimen (Synthroid 50 mcg/day). Takes medication as directed and tolerates well. Symptoms from thyroid standpoint include constipation. Most recent labs reviewed with patient and are not remarkable. Thyroid function in particular is  controlled. Thyroid ultrasound has not been done in the past and is not remarkable. Thyroid antibodies have not been done in the past and are not remarkable. Patient does not have exposure to radiation and/or iodine in the past.    Lab Results   Component Value Date    TSH 1.540 11/09/2020       Postmenopausal:   S/P ELLEN/BSO 2005 for non malignant etiology. She has been under the care of GYN (DR. Melvi Reed). She has been on estradiol 1 mg/day. She reports that attempts in the past to taper dose/discontinue medication have resulted in recurrence of postmenopausal symptoms. After further discussion, pros of continuing Estradiol for symptom management outweigh potential risks      Health Maintenance:  Gena Worthy is due for shingles vaccine. She is S/P COVID vaccine    [facility]:  Patient's past medical, surgical, social and/or family history reviewed, updated in chart, and are non-contributory (unless otherwise stated). Medications and allergies also reviewed and updated in chart. Review of Systems  Review of Systems   Constitutional: Negative for malaise/fatigue and weight loss. HENT: Negative for congestion, ear pain and sore throat.     Eyes: Negative for blurred vision and double vision. Respiratory: Negative for cough, sputum production, shortness of breath and wheezing. Cardiovascular: Negative for chest pain, palpitations, orthopnea and leg swelling. Gastrointestinal: Positive for constipation (frequently takes Senna). Negative for abdominal pain, blood in stool, diarrhea, heartburn, nausea and vomiting. Genitourinary: Negative for dysuria, frequency, hematuria and urgency. Musculoskeletal: Negative for back pain, joint pain, myalgias and neck pain. Skin: Negative for itching and rash. Neurological: Positive for dizziness (reports orthostatis with rapid position change). Negative for tingling, focal weakness, weakness and headaches. Psychiatric/Behavioral: Negative for depression, memory loss and substance abuse. The patient is not nervous/anxious and does not have insomnia. Physical Exam:    VS:    /86   Pulse 68   Temp 95.9 °F (35.5 °C) (Oral)   Resp 18   Ht 5' 2\" (1.575 m)   Wt 117 lb 9.6 oz (53.3 kg)   LMP 01/01/2005   SpO2 98%   BMI 21.51 kg/m²   LAST WEIGHT:  Wt Readings from Last 3 Encounters:   04/07/21 117 lb 9.6 oz (53.3 kg)   02/22/21 118 lb 4.8 oz (53.7 kg)   11/09/20 118 lb (53.5 kg)     Physical Exam   Constitutional: She is oriented to person, place, and time. She appears well-developed and well-nourished. No distress. HENT:   Head: Normocephalic and atraumatic. Right Ear: External ear normal.   Left Ear: External ear normal.   Mouth/Throat: Oropharynx is clear and moist. No oropharyngeal exudate. Eyes: Pupils are equal, round, and reactive to light. Conjunctivae and EOM are normal. Right eye exhibits no discharge. No scleral icterus. Neck: Normal range of motion. Neck supple. No thyromegaly present. Cardiovascular: Normal rate, regular rhythm, normal heart sounds and intact distal pulses. No murmur heard. Pulmonary/Chest: Effort normal. No stridor. No respiratory distress. She has no wheezes.  She has no rales. She exhibits no tenderness. Abdominal: Soft. Bowel sounds are normal. She exhibits no distension and no mass. There is no abdominal tenderness. There is no guarding. Musculoskeletal: Normal range of motion. General: No tenderness or edema. Comments: Swelling of several MCP joints without significant pain, loss of strength or sensation   Lymphadenopathy:     She has no cervical adenopathy. Neurological: She is alert and oriented to person, place, and time. Skin: Skin is warm and dry. No rash noted. She is not diaphoretic. No erythema. No pallor. Psychiatric: She has a normal mood and affect. Her behavior is normal. Thought content normal.       Labs:  Lab Results   Component Value Date     11/08/2019    K 4.3 11/08/2019    CL 97 11/08/2019    CO2 25 11/08/2019    BUN 14 11/08/2019    CREATININE 0.6 11/08/2019    PROT 6.4 12/19/2017    LABALBU 4.3 12/19/2017    CALCIUM 10.4 11/08/2019    GFRAA >60 11/08/2019    LABGLOM >60 11/08/2019    GLUCOSE 110 11/08/2019    AST 20 12/19/2017    ALT 17 12/19/2017    ALKPHOS 62 12/19/2017    BILITOT 0.6 12/19/2017    TSH 1.540 11/09/2020    CHOL 209 04/27/2019    TRIG 76 04/27/2019    HDL 89 04/27/2019    LDLCALC 105 04/27/2019        Lab Results   Component Value Date    CHOL 209 (H) 04/27/2019    CHOL 201 (H) 03/02/2018    CHOL 202 (H) 12/19/2017     Lab Results   Component Value Date    TRIG 76 04/27/2019    TRIG 94 03/02/2018    TRIG 76 12/19/2017     Lab Results   Component Value Date    HDL 89 04/27/2019    HDL 96 03/02/2018    HDL 94 12/19/2017     Lab Results   Component Value Date    LDLCALC 105 (H) 04/27/2019    LDLCALC 86 03/02/2018    LDLCALC 93 12/19/2017       No results found for: LABA1C  Lab Results   Component Value Date    LDLCALC 105 (H) 04/27/2019    CREATININE 0.6 11/08/2019         Assessment / Plan:      Wilman Gray was seen today for medication refill.     Diagnoses and all orders for this visit:    Acquired hypothyroidism: and plan. All questions answered.     Chioma Celis MD

## 2021-04-07 ENCOUNTER — OFFICE VISIT (OUTPATIENT)
Dept: FAMILY MEDICINE CLINIC | Age: 61
End: 2021-04-07
Payer: COMMERCIAL

## 2021-04-07 VITALS
HEART RATE: 68 BPM | DIASTOLIC BLOOD PRESSURE: 86 MMHG | TEMPERATURE: 95.9 F | SYSTOLIC BLOOD PRESSURE: 133 MMHG | BODY MASS INDEX: 21.64 KG/M2 | HEIGHT: 62 IN | RESPIRATION RATE: 18 BRPM | OXYGEN SATURATION: 98 % | WEIGHT: 117.6 LBS

## 2021-04-07 DIAGNOSIS — N95.9 POSTMENOPAUSAL SYMPTOMS: ICD-10-CM

## 2021-04-07 DIAGNOSIS — Z23 NEED FOR SHINGLES VACCINE: ICD-10-CM

## 2021-04-07 DIAGNOSIS — E03.9 ACQUIRED HYPOTHYROIDISM: Primary | ICD-10-CM

## 2021-04-07 DIAGNOSIS — Z12.31 ENCOUNTER FOR SCREENING MAMMOGRAM FOR MALIGNANT NEOPLASM OF BREAST: ICD-10-CM

## 2021-04-07 PROCEDURE — 99214 OFFICE O/P EST MOD 30 MIN: CPT | Performed by: FAMILY MEDICINE

## 2021-04-07 RX ORDER — LEVOTHYROXINE SODIUM 0.05 MG/1
50 TABLET ORAL DAILY
Qty: 90 TABLET | Refills: 3 | Status: SHIPPED
Start: 2021-04-07 | End: 2021-10-13 | Stop reason: SDUPTHER

## 2021-04-07 RX ORDER — ESTRADIOL 1 MG/1
1 TABLET ORAL DAILY
Qty: 30 TABLET | Refills: 11 | Status: SHIPPED
Start: 2021-04-07 | End: 2021-04-16

## 2021-04-07 NOTE — PATIENT INSTRUCTIONS
alcohol to 2 drinks a day for men and 1 drink a day for women. Alcohol may interfere with your medicine. In addition, alcohol can make your low blood pressure worse by causing your body to lose water. ? Avoid or limit caffeine. Caffeine can cause your body to lose water. ? Wear compression stockings to help improve blood flow. When should you call for help? Call 911 anytime you think you may need emergency care. For example, call if:    · You passed out (lost consciousness). Watch closely for changes in your health, and be sure to contact your doctor if:    · You do not get better as expected. Where can you learn more? Go to https://[employer].LogicLibrary. org and sign in to your [employer] account. Enter Z879 in the [employer] box to learn more about \"Orthostatic Hypotension: Care Instructions. \"     If you do not have an account, please click on the \"Sign Up Now\" link. Current as of: August 31, 2020               Content Version: 12.8  © 2006-2021 [employer] Incorporated. Care instructions adapted under license by [facility] ([facility]). If you have questions about a medical condition or this instruction, always ask your healthcare professional. [place] [address] any warranty or liability for your use of this information.

## 2021-04-09 ENCOUNTER — TELEPHONE (OUTPATIENT)
Dept: VASCULAR SURGERY | Age: 61
End: 2021-04-09

## 2021-04-12 ENCOUNTER — HOSPITAL ENCOUNTER (OUTPATIENT)
Dept: CARDIOLOGY | Age: 61
Discharge: HOME | End: 2021-04-12
Payer: COMMERCIAL

## 2021-04-12 DIAGNOSIS — I83.893 SYMPTOMATIC VARICOSE VEINS OF BOTH LOWER EXTREMITIES: ICD-10-CM

## 2021-04-12 DIAGNOSIS — M79.89 LEG SWELLING: ICD-10-CM

## 2021-04-12 DIAGNOSIS — I87.2 CHRONIC VENOUS INSUFFICIENCY: ICD-10-CM

## 2021-04-12 PROCEDURE — 93970 EXTREMITY STUDY: CPT

## 2021-04-12 NOTE — PROGRESS NOTES
[facility] - [facility]    This is a pre read worksheet - prior to official physician interpretation    Sweta Meeks  1960  Date of study: 4/12/21    Indication for study:  Leg pain and swelling, varicose veins  Study : Bilateral Lower Extremity Venous Duplex and Reflux Examination    Duplex examination of the common, deep, superficial femoral, and the popliteal veins of the RIGHT lower extremity identifies spontaneous flow. All scanned veins are compressible and free of echogenic densities. There was no evidence of reflux in the right greater saphenous vein. The right greater saphenous vein measured 0.6 x 0.6 cm. Duplex examination of the common, deep, superficial femoral, and the popliteal veins of the LEFT lower extremity identifies spontaneous flow. All scanned veins are compressible and free of echogenic densities. There was no evidence of reflux in the left greater saphenous vein. The left greater saphenous vein measured 0.6 x 0.6 cm. Additional comments: Negative DVT  There was no evidence of reflux in the right lesser saphenous vein. The right lesser saphenous vein measured 0.6 x 0.6 cm. There was no evidence of reflux in the left lesser saphenous vein. The left lesser saphenous vein measured 0.6 x 0.6 cm.

## 2021-04-14 ENCOUNTER — TELEPHONE (OUTPATIENT)
Dept: VASCULAR SURGERY | Age: 61
End: 2021-04-14

## 2021-04-16 DIAGNOSIS — N95.9 POSTMENOPAUSAL SYMPTOMS: ICD-10-CM

## 2021-04-16 RX ORDER — ESTRADIOL 0.5 MG/1
0.5 TABLET ORAL DAILY
Qty: 90 TABLET | Refills: 3 | Status: SHIPPED
Start: 2021-04-16 | End: 2021-10-13 | Stop reason: SDUPTHER

## 2021-04-21 ENCOUNTER — TELEPHONE (OUTPATIENT)
Dept: VASCULAR SURGERY | Age: 61
End: 2021-04-21

## 2021-04-29 ENCOUNTER — TELEPHONE (OUTPATIENT)
Dept: VASCULAR SURGERY | Age: 61
End: 2021-04-29

## 2021-05-07 ENCOUNTER — HOSPITAL ENCOUNTER (OUTPATIENT)
Dept: MAMMOGRAPHY | Age: 61
Discharge: HOME | End: 2021-05-09
Payer: COMMERCIAL

## 2021-05-07 VITALS — BODY MASS INDEX: 20.98 KG/M2 | HEIGHT: 62 IN | WEIGHT: 114 LBS

## 2021-05-07 DIAGNOSIS — N63.20 MASS OF LEFT BREAST: Primary | ICD-10-CM

## 2021-05-07 DIAGNOSIS — Z12.31 ENCOUNTER FOR SCREENING MAMMOGRAM FOR MALIGNANT NEOPLASM OF BREAST: ICD-10-CM

## 2021-05-07 PROCEDURE — 77063 BREAST TOMOSYNTHESIS BI: CPT

## 2021-05-10 ENCOUNTER — TELEPHONE (OUTPATIENT)
Dept: FAMILY MEDICINE CLINIC | Age: 61
End: 2021-05-10

## 2021-05-10 NOTE — TELEPHONE ENCOUNTER
----- Message from Lor Garcia MD sent at 5/7/2021  3:36 PM EDT -----  Please notify patient that there was a nodule noted on one view of the left breast.  While the radiologist felt that it represents a cyst, recommendation is for ultrasound of that region, which I have ordered

## 2021-05-18 ENCOUNTER — APPOINTMENT (OUTPATIENT)
Dept: MAMMOGRAPHY | Age: 61
End: 2021-05-18
Payer: COMMERCIAL

## 2021-05-18 ENCOUNTER — HOSPITAL ENCOUNTER (OUTPATIENT)
Dept: ULTRASOUND IMAGING | Age: 61
Discharge: HOME | End: 2021-05-18
Payer: COMMERCIAL

## 2021-05-18 DIAGNOSIS — N63.20 MASS OF LEFT BREAST: ICD-10-CM

## 2021-05-18 PROCEDURE — 76642 ULTRASOUND BREAST LIMITED: CPT

## 2021-06-17 ENCOUNTER — E-VISIT (OUTPATIENT)
Dept: FAMILY MEDICINE CLINIC | Age: 61
End: 2021-06-17
Payer: COMMERCIAL

## 2021-06-17 DIAGNOSIS — J01.90 ACUTE SINUSITIS, RECURRENCE NOT SPECIFIED, UNSPECIFIED LOCATION: Primary | ICD-10-CM

## 2021-06-17 PROCEDURE — 99422 OL DIG E/M SVC 11-20 MIN: CPT | Performed by: NURSE PRACTITIONER

## 2021-06-17 RX ORDER — AMOXICILLIN AND CLAVULANATE POTASSIUM 875; 125 MG/1; MG/1
1 TABLET, FILM COATED ORAL 2 TIMES DAILY
Qty: 14 TABLET | Refills: 0 | Status: SHIPPED | OUTPATIENT
Start: 2021-06-17 | End: 2021-06-24

## 2021-06-17 ASSESSMENT — LIFESTYLE VARIABLES: SMOKING_STATUS: NO, I'VE NEVER SMOKED

## 2021-06-17 NOTE — PROGRESS NOTES
HPI: as per patient provided history  Exam: N/A (electronic visit)  ASSESSMENT/PLAN:    Acute sinusitis, recurrence not specified, unspecified location  - amoxicillin-clavulanate (AUGMENTIN) 875-125 MG per tablet; Take 1 tablet by mouth 2 times daily for 7 days  Dispense: 14 tablet; Refill: 0      Patient instructed to call the office if worsens, or fails to improve as anticipated. 11-20 minutes were spent on the digital evaluation and management of this patient.

## 2021-07-14 LAB
CHOLESTEROL, TOTAL: 220 MG/DL (ref 0–199)
GLUCOSE BLD-MCNC: 98 MG/DL (ref 74–107)
HDLC SERPL-MCNC: 95 MG/DL
LDL CHOLESTEROL CALCULATED: 108 MG/DL (ref 0–99)
TRIGL SERPL-MCNC: 85 MG/DL (ref 0–149)

## 2021-10-05 ENCOUNTER — HOSPITAL ENCOUNTER (OUTPATIENT)
Age: 61
Discharge: HOME | End: 2021-10-05
Payer: COMMERCIAL

## 2021-10-05 DIAGNOSIS — E03.9 ACQUIRED HYPOTHYROIDISM: ICD-10-CM

## 2021-10-05 LAB
ALBUMIN SERPL-MCNC: 4.4 G/DL (ref 3.5–5.2)
ALP BLD-CCNC: 72 U/L (ref 35–104)
ALT SERPL-CCNC: 14 U/L (ref 0–32)
ANION GAP SERPL CALCULATED.3IONS-SCNC: 8 MMOL/L (ref 7–16)
AST SERPL-CCNC: 20 U/L (ref 0–31)
BASOPHILS ABSOLUTE: 0.01 E9/L (ref 0–0.2)
BASOPHILS RELATIVE PERCENT: 0.2 % (ref 0–2)
BILIRUB SERPL-MCNC: 0.5 MG/DL (ref 0–1.2)
BUN BLDV-MCNC: 10 MG/DL (ref 6–23)
CALCIUM SERPL-MCNC: 9.9 MG/DL (ref 8.6–10.2)
CHLORIDE BLD-SCNC: 104 MMOL/L (ref 98–107)
CHOLESTEROL, TOTAL: 217 MG/DL (ref 0–199)
CO2: 27 MMOL/L (ref 22–29)
CREAT SERPL-MCNC: 0.6 MG/DL (ref 0.5–1)
EOSINOPHILS ABSOLUTE: 0.16 E9/L (ref 0.05–0.5)
EOSINOPHILS RELATIVE PERCENT: 2.6 % (ref 0–6)
GFR AFRICAN AMERICAN: >60
GFR NON-AFRICAN AMERICAN: >60 ML/MIN/1.73
GLUCOSE BLD-MCNC: 82 MG/DL (ref 74–99)
HCT VFR BLD CALC: 42.4 % (ref 34–48)
HDLC SERPL-MCNC: 104 MG/DL
HEMOGLOBIN: 13.6 G/DL (ref 11.5–15.5)
IMMATURE GRANULOCYTES #: 0.02 E9/L
IMMATURE GRANULOCYTES %: 0.3 % (ref 0–5)
LDL CHOLESTEROL CALCULATED: 97 MG/DL (ref 0–99)
LYMPHOCYTES ABSOLUTE: 1.37 E9/L (ref 1.5–4)
LYMPHOCYTES RELATIVE PERCENT: 22.6 % (ref 20–42)
MCH RBC QN AUTO: 29.7 PG (ref 26–35)
MCHC RBC AUTO-ENTMCNC: 32.1 % (ref 32–34.5)
MCV RBC AUTO: 92.6 FL (ref 80–99.9)
MONOCYTES ABSOLUTE: 0.51 E9/L (ref 0.1–0.95)
MONOCYTES RELATIVE PERCENT: 8.4 % (ref 2–12)
NEUTROPHILS ABSOLUTE: 3.98 E9/L (ref 1.8–7.3)
NEUTROPHILS RELATIVE PERCENT: 65.9 % (ref 43–80)
PDW BLD-RTO: 14.6 FL (ref 11.5–15)
PLATELET # BLD: 266 E9/L (ref 130–450)
PMV BLD AUTO: 10.2 FL (ref 7–12)
POTASSIUM SERPL-SCNC: 4.9 MMOL/L (ref 3.5–5)
RBC # BLD: 4.58 E12/L (ref 3.5–5.5)
SODIUM BLD-SCNC: 139 MMOL/L (ref 132–146)
T4 TOTAL: 8.5 MCG/DL (ref 4.5–11.7)
TOTAL PROTEIN: 6.8 G/DL (ref 6.4–8.3)
TRIGL SERPL-MCNC: 80 MG/DL (ref 0–149)
TSH SERPL DL<=0.05 MIU/L-ACNC: 1.59 UIU/ML (ref 0.27–4.2)
VLDLC SERPL CALC-MCNC: 16 MG/DL
WBC # BLD: 6.1 E9/L (ref 4.5–11.5)

## 2021-10-05 PROCEDURE — 36415 COLL VENOUS BLD VENIPUNCTURE: CPT

## 2021-10-05 PROCEDURE — 85025 COMPLETE CBC W/AUTO DIFF WBC: CPT

## 2021-10-05 PROCEDURE — 80053 COMPREHEN METABOLIC PANEL: CPT

## 2021-10-05 PROCEDURE — 84436 ASSAY OF TOTAL THYROXINE: CPT

## 2021-10-05 PROCEDURE — 84443 ASSAY THYROID STIM HORMONE: CPT

## 2021-10-05 PROCEDURE — 80061 LIPID PANEL: CPT

## 2021-10-10 ASSESSMENT — ENCOUNTER SYMPTOMS
SPUTUM PRODUCTION: 0
DIARRHEA: 0
BACK PAIN: 0
SORE THROAT: 0
BLOOD IN STOOL: 0
HEARTBURN: 0
SHORTNESS OF BREATH: 0
DOUBLE VISION: 0
NAUSEA: 0
WHEEZING: 0
ORTHOPNEA: 0
BLURRED VISION: 0
ABDOMINAL PAIN: 0
COUGH: 0
VOMITING: 0

## 2021-10-11 NOTE — PROGRESS NOTES
HENT: Negative for congestion, ear pain and sore throat. Eyes: Negative for blurred vision and double vision. Respiratory: Negative for cough, sputum production, shortness of breath and wheezing. Cardiovascular: Negative for chest pain, palpitations, orthopnea and leg swelling. Gastrointestinal: Positive for constipation (Taking Senna daily since 2005.). Negative for abdominal pain, blood in stool, diarrhea, heartburn, nausea and vomiting. Genitourinary: Negative for dysuria, frequency, hematuria and urgency. Musculoskeletal: Negative for back pain, joint pain, myalgias and neck pain. Skin: Negative for itching and rash. Neurological: Positive for dizziness (reports orthostatis with rapid position change). Negative for tingling, focal weakness, weakness and headaches. Psychiatric/Behavioral: Negative for depression, memory loss and substance abuse. The patient is not nervous/anxious and does not have insomnia. Physical Exam:    VS:    /80   Pulse 60   Temp 97.1 °F (36.2 °C) (Infrared)   Resp 16   Ht 5' 2\" (1.575 m)   Wt 111 lb 9.6 oz (50.6 kg)   LMP 01/01/2005   SpO2 96%   BMI 20.41 kg/m²   LAST WEIGHT:  Wt Readings from Last 3 Encounters:   10/13/21 111 lb 9.6 oz (50.6 kg)   05/07/21 114 lb (51.7 kg)   04/07/21 117 lb 9.6 oz (53.3 kg)     BMI Readings from Last 3 Encounters:   10/13/21 20.41 kg/m²   05/07/21 20.85 kg/m²   04/07/21 21.51 kg/m²       Physical Exam  Constitutional:       General: She is not in acute distress. Appearance: She is well-developed. She is not diaphoretic. HENT:      Head: Normocephalic and atraumatic. Right Ear: External ear normal.      Left Ear: External ear normal.      Mouth/Throat:      Pharynx: No oropharyngeal exudate. Eyes:      General: No scleral icterus. Right eye: No discharge. Conjunctiva/sclera: Conjunctivae normal.      Pupils: Pupils are equal, round, and reactive to light.    Neck:      Thyroid: No thyromegaly. Cardiovascular:      Rate and Rhythm: Normal rate and regular rhythm. Heart sounds: Normal heart sounds. No murmur heard. Pulmonary:      Effort: Pulmonary effort is normal. No respiratory distress. Breath sounds: No stridor. No wheezing or rales. Chest:      Chest wall: No tenderness. Abdominal:      General: Bowel sounds are normal. There is no distension. Palpations: Abdomen is soft. There is no mass. Tenderness: There is no abdominal tenderness. There is no guarding. Musculoskeletal:         General: No tenderness. Normal range of motion. Cervical back: Normal range of motion and neck supple. Comments: Swelling of several MCP joints without significant pain, loss of strength or sensation   Lymphadenopathy:      Cervical: No cervical adenopathy. Skin:     General: Skin is warm and dry. Coloration: Skin is not pale. Findings: No erythema or rash. Neurological:      Mental Status: She is alert and oriented to person, place, and time. Psychiatric:         Behavior: Behavior normal.         Thought Content:  Thought content normal.         Labs:  Lab Results   Component Value Date     10/05/2021    K 4.9 10/05/2021     10/05/2021    CO2 27 10/05/2021    BUN 10 10/05/2021    CREATININE 0.6 10/05/2021    PROT 6.8 10/05/2021    LABALBU 4.4 10/05/2021    CALCIUM 9.9 10/05/2021    GFRAA >60 10/05/2021    LABGLOM >60 10/05/2021    GLUCOSE 82 10/05/2021    AST 20 10/05/2021    ALT 14 10/05/2021    ALKPHOS 72 10/05/2021    BILITOT 0.5 10/05/2021    TSH 1.590 10/05/2021    CHOL 217 10/05/2021    TRIG 80 10/05/2021     10/05/2021    LDLCALC 97 10/05/2021        Lab Results   Component Value Date    CHOL 217 (H) 10/05/2021    CHOL 220 (H) 07/14/2021    CHOL 209 (H) 04/27/2019     Lab Results   Component Value Date    TRIG 80 10/05/2021    TRIG 85 07/14/2021    TRIG 76 04/27/2019     Lab Results   Component Value Date     10/05/2021 HDL 95 07/14/2021    HDL 89 04/27/2019     Lab Results   Component Value Date    LDLCALC 97 10/05/2021    LDLCALC 108 (H) 07/14/2021    LDLCALC 105 (H) 04/27/2019       No results found for: LABA1C  Lab Results   Component Value Date    LDLCALC 97 10/05/2021    CREATININE 0.6 10/05/2021         Assessment / Plan:      Rizwana Way was seen today for discuss labs. Diagnoses and all orders for this visit:    Acquired hypothyroidism: Stable and well controlled. Med refills  -     levothyroxine (SYNTHROID) 50 MCG tablet; Take 1 tablet by mouth daily    Postmenopausal symptoms: Symptoms stable and well controlled. Med refills  -     estradiol (ESTRACE) 0.5 MG tablet; Take 1 tablet by mouth daily    Abnormal mammogram of left breast: 6-month follow-up  -     [facility] DIGITAL DIAGNOSTIC W OR WO CAD LEFT; Future  -     US BREAST LIMITED LEFT; Future    Chronic idiopathic constipation: Present since hysterectomy 2005. She has been managing with senna all this time.        -     Recommended temporarily discontinuing Senokot and trying MiraLAX daily as directed x7 to 14 days. -     If trial of MiraLAX is unsuccessful, resume senna (SENOKOT) 8.6 MG tablet; Take 2 tablets by mouth daily Indications: Constipation      Call or go to ED immediately if symptoms worsen or persist.    Follow Up:  Return F/U 6 months, for Check up., or sooner if necessary. Educational materials printed for patient's review and were included in patient instructions on his/her After Visit Summary and given to patient at the end of visit. Counseled regarding above diagnosis, including possible risks and complications,  especially if left uncontrolled. Counseled regarding the possible side effects, risks, benefits and alternatives to treatment; patient and/or guardian verbalizes understanding, agrees, feels comfortable with and wishes to proceed with above treatment plan.     Advised patient to call with any new medication issues, and read all Rx

## 2021-10-13 ENCOUNTER — OFFICE VISIT (OUTPATIENT)
Dept: FAMILY MEDICINE CLINIC | Age: 61
End: 2021-10-13
Payer: COMMERCIAL

## 2021-10-13 VITALS
TEMPERATURE: 97.1 F | OXYGEN SATURATION: 96 % | HEIGHT: 62 IN | HEART RATE: 60 BPM | RESPIRATION RATE: 16 BRPM | WEIGHT: 111.6 LBS | SYSTOLIC BLOOD PRESSURE: 108 MMHG | BODY MASS INDEX: 20.54 KG/M2 | DIASTOLIC BLOOD PRESSURE: 80 MMHG

## 2021-10-13 DIAGNOSIS — N95.9 POSTMENOPAUSAL SYMPTOMS: ICD-10-CM

## 2021-10-13 DIAGNOSIS — K59.04 CHRONIC IDIOPATHIC CONSTIPATION: ICD-10-CM

## 2021-10-13 DIAGNOSIS — E03.9 ACQUIRED HYPOTHYROIDISM: Primary | ICD-10-CM

## 2021-10-13 DIAGNOSIS — R92.8 ABNORMAL MAMMOGRAM OF LEFT BREAST: ICD-10-CM

## 2021-10-13 PROCEDURE — 99214 OFFICE O/P EST MOD 30 MIN: CPT | Performed by: FAMILY MEDICINE

## 2021-10-13 RX ORDER — SENNA PLUS 8.6 MG/1
2 TABLET ORAL DAILY
Status: CANCELLED | OUTPATIENT
Start: 2021-10-13

## 2021-10-13 RX ORDER — ESTRADIOL 0.5 MG/1
0.5 TABLET ORAL DAILY
Qty: 90 TABLET | Refills: 3 | Status: SHIPPED
Start: 2022-01-01 | End: 2022-04-13 | Stop reason: SDUPTHER

## 2021-10-13 RX ORDER — LEVOTHYROXINE SODIUM 0.05 MG/1
50 TABLET ORAL DAILY
Qty: 90 TABLET | Refills: 3 | Status: SHIPPED
Start: 2021-11-15 | End: 2022-04-13 | Stop reason: SDUPTHER

## 2021-10-13 RX ORDER — SENNA PLUS 8.6 MG/1
2 TABLET ORAL DAILY
Qty: 60 TABLET | Refills: 11 | COMMUNITY
Start: 2021-10-13 | End: 2022-10-13

## 2021-10-13 ASSESSMENT — ENCOUNTER SYMPTOMS: CONSTIPATION: 1

## 2021-10-13 NOTE — PATIENT INSTRUCTIONS
Patient Education        Sciatica: Care Instructions  Your Care Instructions     Sciatica (say \"sbj-LA-mk-kuh\") is an irritation of one of the sciatic nerves, which come from the spinal cord in the lower back. The sciatic nerves and their branches extend down through the buttock to the foot. Sciatica can develop when an injured disc in the back irritates or presses against a spinal nerve root. Its main symptom is pain, numbness, or weakness that is often worse in the leg or foot than in the back. Sciatica often will improve and go away with time. Early treatment usually includes medicines and exercises to relieve pain. Follow-up care is a key part of your treatment and safety. Be sure to make and go to all appointments, and call your doctor if you are having problems. It's also a good idea to know your test results and keep a list of the medicines you take. How can you care for yourself at home? · Take pain medicines exactly as directed. ? If the doctor gave you a prescription medicine for pain, take it as prescribed. ? If you are not taking a prescription pain medicine, ask your doctor if you can take an over-the-counter medicine. · Use heat or ice to relieve pain. ? To apply heat, put a warm water bottle, heating pad set on low, or warm cloth on your back. Do not go to sleep with a heating pad on your skin. ? To use ice, put ice or a cold pack on the area for 10 to 20 minutes at a time. Put a thin cloth between the ice and your skin. · Avoid sitting if possible, unless it feels better than standing. · Alternate lying down with short walks. Increase your walking distance as you are able to without making your symptoms worse. · Do not do anything that makes your symptoms worse. When should you call for help? Call 911 anytime you think you may need emergency care. For example, call if:    · You are unable to move a leg at all.    Call your doctor now or seek immediate medical care if:    · You have new or worse symptoms in your legs or buttocks. Symptoms may include:  ? Numbness or tingling. ? Weakness. ? Pain.     · You lose bladder or bowel control. Watch closely for changes in your health, and be sure to contact your doctor if:    · You are not getting better as expected. Where can you learn more? Go to [email or web address] org and sign in to your [employer] account. Enter [phone] in the [facility] box to learn more about \"Sciatica: Care Instructions. \"     If you do not have an account, please click on the \"Sign Up Now\" link. Current as of: July 1, 2021               Content Version: 13.0  © 2006-2021 [employer]. Care instructions adapted under license by [facility] ([facility]). If you have questions about a medical condition or this instruction, always ask your healthcare professional. [address] any warranty or liability for your use of this information. Patient Education        Sciatica: Exercises  Introduction  Here are some examples of typical rehabilitation exercises for your condition. Start each exercise slowly. Ease off the exercise if you start to have pain. Your doctor or physical therapist will tell you when you can start these exercises and which ones will work best for you. When you are not being active, find a comfortable position for rest. Some people are comfortable on the floor or a medium-firm bed with a small pillow under their head and another under their knees. Some people prefer to lie on their side with a pillow between their knees. Don't stay in one position for too long. Take short walks (10 to 20 minutes) every 2 to 3 hours. Avoid slopes, hills, and stairs until you feel better. Walk only distances you can manage without pain, especially leg pain. How to do the exercises  Back stretches    1. Get down on your hands and knees on the floor. 2. Relax your head and allow it to droop.  Round your back up toward the ceiling until you feel a nice stretch in your upper, middle, and lower back. Hold this stretch for as long as it feels comfortable, or about 15 to 30 seconds. 3. Return to the starting position with a flat back while you are on your hands and knees. 4. Let your back sway by pressing your stomach toward the floor. Lift your buttocks toward the ceiling. 5. Hold this position for 15 to 30 seconds. 6. Repeat 2 to 4 times. Follow-up care is a key part of your treatment and safety. Be sure to make and go to all appointments, and call your doctor if you are having problems. It's also a good idea to know your test results and keep a list of the medicines you take. Where can you learn more? Go to [email or web address] org and sign in to your [employer] account. Enter K672 in the [employer] box to learn more about \"Sciatica: Exercises. \"     If you do not have an account, please click on the \"Sign Up Now\" link. Current as of: July 1, 2021               Content Version: 13.0  © 2006-2021 Healthwise[employer]. Care instructions adapted under license by [facility] ([facility]). If you have questions about a medical condition or this instruction, always ask your healthcare professional. [place] [address] any warranty or liability for your use of this information. Patient Education         Proper Sitting and Lifting for a Healthy Back (01:01)  Your health professional recommends that you watch this short online health video. Learn how to sit and lift correctly to keep your back healthy. Purpose:  Illustrates how to sit and lift correctly to protect the back. Discusses proper ergonomics for sitting. Goal:  The user will learn how to sit and lift correctly to protect the back. How to watch the video    Scan the QR code   OR Visit the website    https://hwi. se/r/[email or web address]   Current as of: July 1, 2021               Content Version: 13.0  © 2006-2021 Healthwise,

## 2021-11-09 ENCOUNTER — HOSPITAL ENCOUNTER (OUTPATIENT)
Dept: ULTRASOUND IMAGING | Age: 61
Discharge: HOME | End: 2021-11-09
Payer: COMMERCIAL

## 2021-11-09 ENCOUNTER — HOSPITAL ENCOUNTER (OUTPATIENT)
Dept: MAMMOGRAPHY | Age: 61
Discharge: HOME | End: 2021-11-11
Payer: COMMERCIAL

## 2021-11-09 DIAGNOSIS — R92.8 ABNORMAL MAMMOGRAM OF LEFT BREAST: ICD-10-CM

## 2021-11-09 PROCEDURE — 76642 ULTRASOUND BREAST LIMITED: CPT

## 2021-11-09 PROCEDURE — 77065 DX MAMMO INCL CAD UNI: CPT

## 2022-02-16 DIAGNOSIS — J01.90 ACUTE SINUSITIS, RECURRENCE NOT SPECIFIED, UNSPECIFIED LOCATION: ICD-10-CM

## 2022-02-16 RX ORDER — FLUTICASONE PROPIONATE 50 MCG
SPRAY, SUSPENSION (ML) NASAL
Qty: 3 EACH | Refills: 0 | Status: SHIPPED
Start: 2022-02-16 | End: 2022-04-13 | Stop reason: SDUPTHER

## 2022-02-17 ENCOUNTER — E-VISIT (OUTPATIENT)
Dept: PRIMARY CARE CLINIC | Age: 62
End: 2022-02-17
Payer: COMMERCIAL

## 2022-02-17 DIAGNOSIS — J06.9 UPPER RESPIRATORY TRACT INFECTION, UNSPECIFIED TYPE: Primary | ICD-10-CM

## 2022-02-17 PROCEDURE — 99422 OL DIG E/M SVC 11-20 MIN: CPT | Performed by: NURSE PRACTITIONER

## 2022-02-17 RX ORDER — AMOXICILLIN AND CLAVULANATE POTASSIUM 875; 125 MG/1; MG/1
1 TABLET, FILM COATED ORAL 2 TIMES DAILY
Qty: 20 TABLET | Refills: 0 | Status: ON HOLD | OUTPATIENT
Start: 2022-02-17 | End: 2022-02-19

## 2022-02-17 RX ORDER — METHYLPREDNISOLONE 4 MG/1
TABLET ORAL
Qty: 1 KIT | Refills: 0 | Status: ON HOLD | OUTPATIENT
Start: 2022-02-17 | End: 2022-02-20 | Stop reason: HOSPADM

## 2022-02-17 ASSESSMENT — LIFESTYLE VARIABLES: SMOKING_STATUS: NO, I'VE NEVER SMOKED

## 2022-02-17 NOTE — PROGRESS NOTES
Reviewed questionnaire    Reviewed meds/allergies    Dx URI    Plan Rx given for augmentin and medrol dose pack.  Please follow up with your PCP if no improvement in symptoms with use of medications    Time spent on visit 11 min

## 2022-02-19 ENCOUNTER — APPOINTMENT (OUTPATIENT)
Dept: GENERAL RADIOLOGY | Age: 62
DRG: 310 | End: 2022-02-19
Payer: COMMERCIAL

## 2022-02-19 ENCOUNTER — HOSPITAL ENCOUNTER (INPATIENT)
Age: 62
LOS: 1 days | Discharge: HOME | DRG: 310 | End: 2022-02-20
Attending: EMERGENCY MEDICINE | Admitting: INTERNAL MEDICINE
Payer: COMMERCIAL

## 2022-02-19 DIAGNOSIS — I48.91 ATRIAL FIBRILLATION WITH RAPID VENTRICULAR RESPONSE (HCC): Primary | ICD-10-CM

## 2022-02-19 LAB
ALBUMIN SERPL-MCNC: 4.4 G/DL (ref 3.5–5.2)
ALP BLD-CCNC: 91 U/L (ref 35–104)
ALT SERPL-CCNC: 22 U/L (ref 0–32)
ANION GAP SERPL CALCULATED.3IONS-SCNC: 12 MMOL/L (ref 7–16)
APTT: 27.4 SEC (ref 24.5–35.1)
AST SERPL-CCNC: 31 U/L (ref 0–31)
BACTERIA: NORMAL /HPF
BASOPHILS ABSOLUTE: 0.01 E9/L (ref 0–0.2)
BASOPHILS RELATIVE PERCENT: 0.1 % (ref 0–2)
BILIRUB SERPL-MCNC: <0.2 MG/DL (ref 0–1.2)
BILIRUBIN URINE: NEGATIVE
BLOOD, URINE: NEGATIVE
BUN BLDV-MCNC: 18 MG/DL (ref 6–23)
CALCIUM SERPL-MCNC: 9.3 MG/DL (ref 8.6–10.2)
CHLORIDE BLD-SCNC: 107 MMOL/L (ref 98–107)
CLARITY: CLEAR
CO2: 25 MMOL/L (ref 22–29)
COLOR: NORMAL
CREAT SERPL-MCNC: 0.6 MG/DL (ref 0.5–1)
EKG ATRIAL RATE: 357 BPM
EKG Q-T INTERVAL: 296 MS
EKG QRS DURATION: 64 MS
EKG QTC CALCULATION (BAZETT): 485 MS
EKG R AXIS: 57 DEGREES
EKG T AXIS: 27 DEGREES
EKG VENTRICULAR RATE: 162 BPM
EOSINOPHILS ABSOLUTE: 0.03 E9/L (ref 0.05–0.5)
EOSINOPHILS RELATIVE PERCENT: 0.4 % (ref 0–6)
GFR AFRICAN AMERICAN: >60
GFR NON-AFRICAN AMERICAN: >60 ML/MIN/1.73
GLUCOSE BLD-MCNC: 132 MG/DL (ref 74–99)
GLUCOSE URINE: NEGATIVE MG/DL
HCT VFR BLD CALC: 44.4 % (ref 34–48)
HEMOGLOBIN: 14.3 G/DL (ref 11.5–15.5)
IMMATURE GRANULOCYTES #: 0.03 E9/L
IMMATURE GRANULOCYTES %: 0.4 % (ref 0–5)
INR BLD: 0.9
KETONES, URINE: NEGATIVE MG/DL
LACTIC ACID: 2.5 MMOL/L (ref 0.5–2.2)
LEUKOCYTE ESTERASE, URINE: NEGATIVE
LYMPHOCYTES ABSOLUTE: 1.15 E9/L (ref 1.5–4)
LYMPHOCYTES RELATIVE PERCENT: 14.1 % (ref 20–42)
MAGNESIUM: 2.2 MG/DL (ref 1.6–2.6)
MCH RBC QN AUTO: 30 PG (ref 26–35)
MCHC RBC AUTO-ENTMCNC: 32.2 % (ref 32–34.5)
MCV RBC AUTO: 93.1 FL (ref 80–99.9)
MONOCYTES ABSOLUTE: 0.49 E9/L (ref 0.1–0.95)
MONOCYTES RELATIVE PERCENT: 6 % (ref 2–12)
NEUTROPHILS ABSOLUTE: 6.45 E9/L (ref 1.8–7.3)
NEUTROPHILS RELATIVE PERCENT: 79 % (ref 43–80)
NITRITE, URINE: NEGATIVE
PDW BLD-RTO: 13.9 FL (ref 11.5–15)
PH UA: 6 (ref 5–9)
PLATELET # BLD: 275 E9/L (ref 130–450)
PMV BLD AUTO: 9.9 FL (ref 7–12)
POTASSIUM SERPL-SCNC: 4.5 MMOL/L (ref 3.5–5)
PRO-BNP: 180 PG/ML (ref 0–125)
PROTEIN UA: NEGATIVE MG/DL
PROTHROMBIN TIME: 9.5 SEC (ref 9.3–12.4)
RBC # BLD: 4.77 E12/L (ref 3.5–5.5)
RBC UA: NORMAL /HPF (ref 0–2)
SARS-COV-2, NAAT: NOT DETECTED
SODIUM BLD-SCNC: 144 MMOL/L (ref 132–146)
SPECIFIC GRAVITY UA: 1.01 (ref 1–1.03)
T3 FREE: 2.1 PG/ML (ref 2–4.4)
T4 FREE: 1.25 NG/DL (ref 0.93–1.7)
TOTAL PROTEIN: 7.2 G/DL (ref 6.4–8.3)
TROPONIN, HIGH SENSITIVITY: 9 NG/L (ref 0–9)
TSH SERPL DL<=0.05 MIU/L-ACNC: 2.32 UIU/ML (ref 0.27–4.2)
UROBILINOGEN, URINE: 0.2 E.U./DL
WBC # BLD: 8.2 E9/L (ref 4.5–11.5)
WBC UA: NORMAL /HPF (ref 0–5)

## 2022-02-19 PROCEDURE — 2500000003 HC RX 250 WO HCPCS: Performed by: EMERGENCY MEDICINE

## 2022-02-19 PROCEDURE — 81001 URINALYSIS AUTO W/SCOPE: CPT

## 2022-02-19 PROCEDURE — 6370000000 HC RX 637 (ALT 250 FOR IP): Performed by: NURSE PRACTITIONER

## 2022-02-19 PROCEDURE — 96374 THER/PROPH/DIAG INJ IV PUSH: CPT

## 2022-02-19 PROCEDURE — 99222 1ST HOSP IP/OBS MODERATE 55: CPT | Performed by: INTERNAL MEDICINE

## 2022-02-19 PROCEDURE — 71045 X-RAY EXAM CHEST 1 VIEW: CPT

## 2022-02-19 PROCEDURE — 84443 ASSAY THYROID STIM HORMONE: CPT

## 2022-02-19 PROCEDURE — 84481 FREE ASSAY (FT-3): CPT

## 2022-02-19 PROCEDURE — 2580000003 HC RX 258: Performed by: NURSE PRACTITIONER

## 2022-02-19 PROCEDURE — 2580000003 HC RX 258: Performed by: EMERGENCY MEDICINE

## 2022-02-19 PROCEDURE — 84484 ASSAY OF TROPONIN QUANT: CPT

## 2022-02-19 PROCEDURE — 99283 EMERGENCY DEPT VISIT LOW MDM: CPT

## 2022-02-19 PROCEDURE — 87635 SARS-COV-2 COVID-19 AMP PRB: CPT

## 2022-02-19 PROCEDURE — 84439 ASSAY OF FREE THYROXINE: CPT

## 2022-02-19 PROCEDURE — 6360000002 HC RX W HCPCS: Performed by: EMERGENCY MEDICINE

## 2022-02-19 PROCEDURE — 93005 ELECTROCARDIOGRAM TRACING: CPT | Performed by: EMERGENCY MEDICINE

## 2022-02-19 PROCEDURE — 83880 ASSAY OF NATRIURETIC PEPTIDE: CPT

## 2022-02-19 PROCEDURE — 85025 COMPLETE CBC W/AUTO DIFF WBC: CPT

## 2022-02-19 PROCEDURE — 83605 ASSAY OF LACTIC ACID: CPT

## 2022-02-19 PROCEDURE — 2500000003 HC RX 250 WO HCPCS: Performed by: INTERNAL MEDICINE

## 2022-02-19 PROCEDURE — 93010 ELECTROCARDIOGRAM REPORT: CPT | Performed by: INTERNAL MEDICINE

## 2022-02-19 PROCEDURE — 83735 ASSAY OF MAGNESIUM: CPT

## 2022-02-19 PROCEDURE — APPSS45 APP SPLIT SHARED TIME 31-45 MINUTES: Performed by: NURSE PRACTITIONER

## 2022-02-19 PROCEDURE — 71046 X-RAY EXAM CHEST 2 VIEWS: CPT

## 2022-02-19 PROCEDURE — 6360000002 HC RX W HCPCS: Performed by: NURSE PRACTITIONER

## 2022-02-19 PROCEDURE — 85610 PROTHROMBIN TIME: CPT

## 2022-02-19 PROCEDURE — 6370000000 HC RX 637 (ALT 250 FOR IP): Performed by: INTERNAL MEDICINE

## 2022-02-19 PROCEDURE — 85730 THROMBOPLASTIN TIME PARTIAL: CPT

## 2022-02-19 PROCEDURE — 80053 COMPREHEN METABOLIC PANEL: CPT

## 2022-02-19 PROCEDURE — 93005 ELECTROCARDIOGRAM TRACING: CPT | Performed by: INTERNAL MEDICINE

## 2022-02-19 PROCEDURE — 96372 THER/PROPH/DIAG INJ SC/IM: CPT

## 2022-02-19 PROCEDURE — 2140000000 HC CCU INTERMEDIATE R&B

## 2022-02-19 RX ORDER — POTASSIUM CHLORIDE 20 MEQ/1
40 TABLET, EXTENDED RELEASE ORAL PRN
Status: DISCONTINUED | OUTPATIENT
Start: 2022-02-19 | End: 2022-02-20 | Stop reason: HOSPADM

## 2022-02-19 RX ORDER — ACETAMINOPHEN 325 MG/1
650 TABLET ORAL EVERY 6 HOURS PRN
Status: DISCONTINUED | OUTPATIENT
Start: 2022-02-19 | End: 2022-02-20 | Stop reason: HOSPADM

## 2022-02-19 RX ORDER — SENNA PLUS 8.6 MG/1
1 TABLET ORAL DAILY PRN
Status: DISCONTINUED | OUTPATIENT
Start: 2022-02-19 | End: 2022-02-20 | Stop reason: HOSPADM

## 2022-02-19 RX ORDER — ACETAMINOPHEN 650 MG/1
650 SUPPOSITORY RECTAL EVERY 6 HOURS PRN
Status: DISCONTINUED | OUTPATIENT
Start: 2022-02-19 | End: 2022-02-20 | Stop reason: HOSPADM

## 2022-02-19 RX ORDER — SODIUM CHLORIDE 9 MG/ML
1000 INJECTION, SOLUTION INTRAVENOUS CONTINUOUS
Status: DISCONTINUED | OUTPATIENT
Start: 2022-02-19 | End: 2022-02-19

## 2022-02-19 RX ORDER — DIGOXIN 125 MCG
125 TABLET ORAL DAILY
Status: DISCONTINUED | OUTPATIENT
Start: 2022-02-20 | End: 2022-02-20

## 2022-02-19 RX ORDER — DIGOXIN 0.25 MG/ML
500 INJECTION INTRAMUSCULAR; INTRAVENOUS ONCE
Status: COMPLETED | OUTPATIENT
Start: 2022-02-19 | End: 2022-02-19

## 2022-02-19 RX ORDER — DIGOXIN 0.25 MG/ML
250 INJECTION INTRAMUSCULAR; INTRAVENOUS EVERY 6 HOURS
Status: DISPENSED | OUTPATIENT
Start: 2022-02-19 | End: 2022-02-20

## 2022-02-19 RX ORDER — MAGNESIUM SULFATE IN WATER 40 MG/ML
2000 INJECTION, SOLUTION INTRAVENOUS PRN
Status: DISCONTINUED | OUTPATIENT
Start: 2022-02-19 | End: 2022-02-20 | Stop reason: HOSPADM

## 2022-02-19 RX ORDER — SODIUM CHLORIDE 9 MG/ML
25 INJECTION, SOLUTION INTRAVENOUS PRN
Status: DISCONTINUED | OUTPATIENT
Start: 2022-02-19 | End: 2022-02-20 | Stop reason: HOSPADM

## 2022-02-19 RX ORDER — SODIUM CHLORIDE 0.9 % (FLUSH) 0.9 %
5-40 SYRINGE (ML) INJECTION EVERY 12 HOURS SCHEDULED
Status: DISCONTINUED | OUTPATIENT
Start: 2022-02-19 | End: 2022-02-20 | Stop reason: HOSPADM

## 2022-02-19 RX ORDER — ONDANSETRON 4 MG/1
4 TABLET, ORALLY DISINTEGRATING ORAL EVERY 8 HOURS PRN
Status: DISCONTINUED | OUTPATIENT
Start: 2022-02-19 | End: 2022-02-20 | Stop reason: HOSPADM

## 2022-02-19 RX ORDER — SODIUM CHLORIDE 0.9 % (FLUSH) 0.9 %
5-40 SYRINGE (ML) INJECTION PRN
Status: DISCONTINUED | OUTPATIENT
Start: 2022-02-19 | End: 2022-02-20 | Stop reason: HOSPADM

## 2022-02-19 RX ORDER — POTASSIUM CHLORIDE 7.45 MG/ML
10 INJECTION INTRAVENOUS PRN
Status: DISCONTINUED | OUTPATIENT
Start: 2022-02-19 | End: 2022-02-20 | Stop reason: HOSPADM

## 2022-02-19 RX ORDER — LEVOTHYROXINE SODIUM 0.05 MG/1
50 TABLET ORAL DAILY
Status: DISCONTINUED | OUTPATIENT
Start: 2022-02-19 | End: 2022-02-20 | Stop reason: HOSPADM

## 2022-02-19 RX ORDER — ONDANSETRON 2 MG/ML
4 INJECTION INTRAMUSCULAR; INTRAVENOUS EVERY 6 HOURS PRN
Status: DISCONTINUED | OUTPATIENT
Start: 2022-02-19 | End: 2022-02-20 | Stop reason: HOSPADM

## 2022-02-19 RX ADMIN — DEXTROSE MONOHYDRATE 15 MG/HR: 50 INJECTION, SOLUTION INTRAVENOUS at 12:13

## 2022-02-19 RX ADMIN — DEXTROSE MONOHYDRATE 5 MG/HR: 50 INJECTION, SOLUTION INTRAVENOUS at 04:30

## 2022-02-19 RX ADMIN — APIXABAN 5 MG: 5 TABLET, FILM COATED ORAL at 20:31

## 2022-02-19 RX ADMIN — ENOXAPARIN SODIUM 50 MG: 60 INJECTION SUBCUTANEOUS at 05:30

## 2022-02-19 RX ADMIN — SODIUM CHLORIDE 1000 ML: 9 INJECTION, SOLUTION INTRAVENOUS at 05:13

## 2022-02-19 RX ADMIN — Medication 10 ML: at 08:22

## 2022-02-19 RX ADMIN — Medication 10 ML: at 20:31

## 2022-02-19 RX ADMIN — LEVOTHYROXINE SODIUM 50 MCG: 0.05 TABLET ORAL at 08:22

## 2022-02-19 RX ADMIN — DIGOXIN 500 MCG: 0.25 INJECTION INTRAMUSCULAR; INTRAVENOUS at 14:53

## 2022-02-19 RX ADMIN — METOPROLOL TARTRATE 25 MG: 25 TABLET, FILM COATED ORAL at 20:31

## 2022-02-19 RX ADMIN — METOPROLOL TARTRATE 25 MG: 25 TABLET, FILM COATED ORAL at 14:54

## 2022-02-19 ASSESSMENT — PAIN SCALES - GENERAL
PAINLEVEL_OUTOF10: 0
PAINLEVEL_OUTOF10: 0

## 2022-02-19 ASSESSMENT — ENCOUNTER SYMPTOMS
SHORTNESS OF BREATH: 0
EYE REDNESS: 0
WHEEZING: 0
COUGH: 0
BACK PAIN: 0
VOMITING: 0
SINUS PRESSURE: 0
NAUSEA: 0
DIARRHEA: 0
ABDOMINAL DISTENTION: 0
EYE PAIN: 0
SORE THROAT: 0
EYE DISCHARGE: 0

## 2022-02-19 NOTE — CONSULTS
Inpatient Cardiology Consultation      Reason for Consult: New onset atrial fibrillation with rapid ventricular response    Consulting Physician: Dr. Yana Varghese    Requesting Physician:  Dr. Alex Marroquin    Date of Consultation: 2/19/2022    HISTORY OF PRESENT ILLNESS:       This 78-year-old female is new to [facility] cardiology. She denies any cardiac history. She was recently diagnosed with a sinus infection  by primary care on February 17. She was given Augmentin and a Medrol Dosepak. She took the medication on Thursday and Friday evening was the last dose. She was also taking Afrin nasal spray. Late Friday evening she felt \"wired \". She developed palpitations while lying in bed. She had a little chest pressure mostly when she turned over on her left side but it did not last long. She denies any dyspnea, dizziness, presyncope and syncope and orthopnea and swelling of the lower extremities. She called her daughter who is a nurse anesthetist to check her and then she came to the emergency room due to a fast heart rate. Blood pressure on admission was 98/73 and she was afebrile with no hypoxia on room air. EKG showed atrial fibrillation with rapid ventricular response    Chest x-ray showed chronic findings. Potassium was 4.5 with a BUN of 18 and a creatinine of 0.6, lactic acid 2.5,  and troponin 9, TSH 2.32 and WBCs 8.2 with an H&H of 14.3 and 44.4    She was given a dose of therapeutic Lovenox around 5 AM and started on Eliquis. She is on a Cardizem drip following a 10 mg Cardizem bolus. She remains in atrial fibrillation and her heart rate is around 106 bpm.    2D echocardiogram is pending    Past medical history    1. Non-smoker  2. Father with premature coronary artery disease and had a CABG in his 62s  3. Denies hypertension, CVA, diabetes, cancer  4. COVID-19 infection last year, not hospitalized,  5.  Postmenopausal and on estradiol, status post hysterectomy,  6. Hypothyroidism  7. Trigeminal neuralgia  8. Bilateral hip replacement  9. Arthroscopic knee surgery  10. Colonoscopy      Medications Prior to admit:  Prior to Admission medications    Medication Sig Start Date End Date Taking?  Authorizing Provider   methylPREDNISolone (MEDROL, TAMMY,) 4 MG tablet Take as directed 2/17/22 2/23/22 Yes ROSI Salomon - [facility]   fluticasone [facility]) 50 MCG/ACT nasal spray Use 2 sprays each nostril QMWF, off other days 2/16/22 5/16/23 Yes Toña Mak MD   senna (SENOKOT) 8.6 MG tablet Take 2 tablets by mouth daily Indications: Constipation 10/13/21 10/13/22 Yes Toña Mak MD   estradiol (ESTRACE) 0.5 MG tablet Take 1 tablet by mouth daily 1/1/22 1/1/23  Toña Mak MD   levothyroxine (SYNTHROID) 50 MCG tablet Take 1 tablet by mouth daily 11/15/21 11/15/22  Toña Mak MD   Multiple Vitamins-Minerals (THERAPEUTIC MULTIVITAMIN-MINERALS) tablet Take 1 tablet by mouth daily    Historical Provider, MD   Ascorbic Acid (VITAMIN C) 500 MG CAPS Take 2 tablets by mouth daily     Historical Provider, MD   calcium citrate-vitamin D (CITRICAL + D) 315-250 MG-UNIT TABS per tablet Take 1 tablet by mouth 2 times daily (with meals)    Historical Provider, MD       Current Medications:    Current Facility-Administered Medications: levothyroxine (SYNTHROID) tablet 50 mcg, 50 mcg, Oral, Daily  sodium chloride flush 0.9 % injection 5-40 mL, 5-40 mL, IntraVENous, 2 times per day  sodium chloride flush 0.9 % injection 5-40 mL, 5-40 mL, IntraVENous, PRN  0.9 % sodium chloride infusion, 25 mL, IntraVENous, PRN  ondansetron (ZOFRAN-ODT) disintegrating tablet 4 mg, 4 mg, Oral, Q8H PRN **OR** ondansetron (ZOFRAN) injection 4 mg, 4 mg, IntraVENous, Q6H PRN  acetaminophen (TYLENOL) tablet 650 mg, 650 mg, Oral, Q6H PRN **OR** acetaminophen (TYLENOL) suppository 650 mg, 650 mg, Rectal, Q6H PRN  potassium chloride (KLOR-CON M) extended release tablet 40 mEq, 40 mEq, Oral, PRN **OR** potassium bicarb-citric acid (EFFER-K) effervescent tablet 40 mEq, 40 mEq, Oral, PRN **OR** potassium chloride 10 mEq/100 mL IVPB (Peripheral Line), 10 mEq, IntraVENous, PRN  magnesium sulfate 2000 mg in 50 mL IVPB premix, 2,000 mg, IntraVENous, PRN  senna (SENOKOT) tablet 8.6 mg, 1 tablet, Oral, Daily PRN  apixaban (ELIQUIS) tablet 5 mg, 5 mg, Oral, BID  perflutren lipid microspheres (DEFINITY) injection 1.65 mg, 1.5 mL, IntraVENous, ONCE PRN  dilTIAZem 100 mg in dextrose 5 % 100 mL infusion (ADD-[place]), 5-15 mg/hr, IntraVENous, Continuous  Facility-Administered Medications Ordered in Other Encounters: iohexol (OMNIPAQUE 240) injection 50 mL, 50 mL, Oral, ONCE PRN    Allergies:  Diclofenac sodium and Sulfa antibiotics    Social History: Non-smoker nondrinker and no illicit drugs and     Father had a CABG in his 62s and he  of a stroke at age [de-identified]  Family History: Mother  of throat cancer at 80  Family History   Problem Relation Age of Onset    Cancer Mother         bone, esophagus    Arthritis Mother     Heart Disease Father     Stroke Father     High Blood Pressure Father     High Cholesterol Father     Cancer Paternal Aunt         breast    Cancer Paternal Uncle         prostate    Stroke Paternal Grandfather     Cancer Paternal Aunt         breast    Cancer Paternal Cousin         Breast cancer don't know age per pt. REVIEW OF SYSTEMS:     · Constitutional: Denies fatigue, fevers, chills or night sweats  · Eyes: Denies visual changes or drainage  · ENT: Denies headaches or hearing loss. No mouth sores or sore throat. No epistaxis   · Cardiovascular: Denies chest pain, pressure or palpitations. No lower extremity swelling. · Respiratory: Denies ORTIZ, cough, orthopnea or PND. No hemoptysis   · Gastrointestinal: Denies hematemesis or anorexia.  No hematochezia or melena    · Genitourinary: Denies urgency, dysuria or hematuria. · Musculoskeletal: Denies gait disturbance, weakness or joint complaints  · Integumentary: Denies rash, hives or pruritis   · Neurological: Denies dizziness, headaches or seizures. No numbness or tingling  · Psychiatric: Denies anxiety or depression. · Endocrine: Denies temperature intolerance. No recent weight change. .  · Hematologic/Lymphatic: Denies abnormal bruising or bleeding. No swollen lymph nodes    PHYSICAL EXAM:   BP 96/65   Pulse 110   Temp 98.1 °F (36.7 °C) (Oral)   Resp 18   Ht 5' 2\" (1.575 m)   Wt 111 lb (50.3 kg)   LMP 01/01/2005   SpO2 100%   BMI 20.30 kg/m²   CONST:  Well developed, well nourished who appears of stated age. Awake, alert and cooperative. No apparent distress. HEENT:   Head- Normocephalic, atraumatic   Eyes- Conjunctivae pink, anicteric  Throat- Oral mucosa pink and moist  Neck-  No stridor, trachea midline, no jugular venous distention. No carotid bruit. CHEST: Chest symmetrical and non-tender to palpation. No accessory muscle use or intercostal retractions  RESPIRATORY: Lung sounds - clear throughout fields   CARDIOVASCULAR:     Heart Inspection- shows no noted pulsations  Heart Palpation- no heaves or thrills; PMI is non-displaced   Heart Ausculation-irregularly irregular rate and rhythm, no murmur. No s3, s4 or rub   PV: No lower extremity edema. No varicosities. Pedal pulses palpable, no clubbing or cyanosis   ABDOMEN: Soft, non-tender to light palpation. Bowel sounds present. No palpable masses no organomegaly; no abdominal bruit  MS: Good muscle strength and tone. No atrophy or abnormal movements. : Deferred  SKIN: Warm and dry no statis dermatitis or ulcers   NEURO / PSYCH: Oriented to person, place and time. Speech clear and appropriate. Follows all commands.  Pleasant affect     DATA:    ECG / Tele strips:  atrial fibrillation  Diagnostic:    No intake or output data in the 24 hours ending 02/19/22 1220    Labs:   CBC:   Recent Labs

## 2022-02-19 NOTE — H&P
[address] [facility]  History and Physical      CHIEF COMPLAINT:    Chief Complaint   Patient presents with    Palpitations     since midnight denies chest pain        Patient of Orquidea Reagan MD presents with:  New onset atrial fibrillation ([address]    History of Present Illness:   Patient woke up yesterday with acute onset of severe palpitations and a dull nonradiating anterior chest discomfort. She came to the emergency department and found to be in new onset atrial fibrillation with RVR. This is a new diagnosis for her. She was started on diltiazem drip. She is feeling better but still notices palpitations. Chest pressure/pain is gone. REVIEW OF SYSTEMS:  Pertinent negatives are above in HPI. 10 point ROS otherwise negative. Past Medical History:   Diagnosis Date    Acquired Hypothyroidism     Kidney cysts     Liver cyst     Osteoarthritis     Postmenopausal     Shingles 2012    Thyroid disease     Trigeminal neuralgia 12/8/2019         Past Surgical History:   Procedure Laterality Date    COLONOSCOPY  2008    HYSTERECTOMY, TOTAL ABDOMINAL      JOINT REPLACEMENT Bilateral     KNEE ARTHROSCOPY Right 2017    CO COLONOSCOPY W/BIOPSY SINGLE/MULTIPLE N/A 8/6/2018    COLONOSCOPY WITH BIOPSY performed by Tessa Mcgowan MD at [facility] ENDOSCOPY       Medications Prior to Admission:    Medications Prior to Admission: methylPREDNISolone (MEDROL, TAMMY,) 4 MG tablet, Take as directed  [DISCONTINUED] amoxicillin-clavulanate (AUGMENTIN) 875-125 MG per tablet, Take 1 tablet by mouth 2 times daily for 10 days Barrier contraception is recommended. Take with a minimum of 8 ounces of water.   fluticasone (FLONASE) 50 MCG/ACT nasal spray, Use 2 sprays each nostril QMWF, off other days  senna (SENOKOT) 8.6 MG tablet, Take 2 tablets by mouth daily Indications: Constipation  estradiol (ESTRACE) 0.5 MG tablet, Take 1 tablet by mouth daily  levothyroxine (SYNTHROID) 50 MCG tablet, Take 1 tablet by mouth daily  Multiple Vitamins-Minerals (THERAPEUTIC MULTIVITAMIN-MINERALS) tablet, Take 1 tablet by mouth daily  Ascorbic Acid (VITAMIN C) 500 MG CAPS, Take 2 tablets by mouth daily   calcium citrate-vitamin D (CITRICAL + D) 315-250 MG-UNIT TABS per tablet, Take 1 tablet by mouth 2 times daily (with meals)    Note that the patient's home medications were reviewed and the above list is accurate to the best of my knowledge at the time of the exam.    Allergies:    Diclofenac sodium and Sulfa antibiotics    Social History:    reports that she has never smoked. She has never used smokeless tobacco. She reports current alcohol use of about 5.0 standard drinks of alcohol per week. She reports that she does not use drugs. Family History:   family history includes Arthritis in her mother; Cancer in her mother, paternal aunt, paternal aunt, paternal cousin, and paternal uncle; Heart Disease in her father; High Blood Pressure in her father; High Cholesterol in her father; Stroke in her father and paternal grandfather. PHYSICAL EXAM:    Vitals:  BP 96/72   Pulse 113   Temp 98.1 °F (36.7 °C)   Resp 16   Ht 5' 2\" (1.575 m)   Wt 111 lb (50.3 kg)   LMP 01/01/2005   SpO2 99%   BMI 20.30 kg/m²       General appearance: NAD, conversant  Eyes: Sclerae anicteric, PERRLA  HEENT: AT/NC, MMM  Neck: FROM, supple, no thyromegaly  Lymph: No cervical / supraclavicular lymphadenopathy  Lungs: Clear to auscultation, WOB normal  CV: Irregular, rapid, no MRGs, no lower extremity edema  Abdomen: Soft, non-tender; no masses or HSM, +BS  Extremities: FROM without synovitis. No clubbing or cyanosis of the hands. Skin: no rash, induration, lesions, or ulcers  Psych: Calm and cooperative. Normal judgement and insight. Normal mood and affect. Neuro: Alert and interactive, face symmetric, speech fluent. LABS:  All labs reviewed.   Of note:  CBC with Differential:    Lab Results   Component Value Date    WBC 8.2 02/19/2022    RBC 4.77 02/19/2022    HGB 14.3 02/19/2022    HCT 44.4 02/19/2022     02/19/2022    MCV 93.1 02/19/2022    MCH 30.0 02/19/2022    MCHC 32.2 02/19/2022    RDW 13.9 02/19/2022    SEGSPCT 80 08/27/2013    LYMPHOPCT 14.1 02/19/2022    MONOPCT 6.0 02/19/2022    BASOPCT 0.1 02/19/2022    MONOSABS 0.49 02/19/2022    LYMPHSABS 1.15 02/19/2022    EOSABS 0.03 02/19/2022    BASOSABS 0.01 02/19/2022     CMP:    Lab Results   Component Value Date     02/19/2022    K 4.5 02/19/2022     02/19/2022    CO2 25 02/19/2022    BUN 18 02/19/2022    CREATININE 0.6 02/19/2022    GFRAA >60 02/19/2022    LABGLOM >60 02/19/2022    GLUCOSE 132 02/19/2022    PROT 7.2 02/19/2022    LABALBU 4.4 02/19/2022    CALCIUM 9.3 02/19/2022    BILITOT <0.2 02/19/2022    ALKPHOS 91 02/19/2022    AST 31 02/19/2022    ALT 22 02/19/2022       Imaging:  I've personally reviewed the patient's CXR  Chronic appearing findings.  PA and lateral views would be useful for further   assessment, if symptoms persist.     To me there is something at KAI, maybe just bony structure    EKG:  I've personally reviewed the patient's EKG:  AF RVR     Telemetry:  I've personally reviewed the patient's telemetry:  AF RVR -130's    ASSESSMENT/PLAN:  Principal Problem:    New onset atrial fibrillation ([address]  Active Problems:    Acquired hypothyroidism    Atrial fibrillation with rapid ventricular response ([address]  Resolved Problems:    * No resolved hospital problems. *      Increase dilt gtt    [id number] 1    D/w her rationale for anticoag and pros/cons of warfarin vs DOAC including cost, reversibility, monitoring, interactions. She opts for DOAC if needed. With her low [id number] one could argue she doesn't need it at all, but if cardioversion is recommended then she will need at least a month, and next year her [id number] rises to 2 and risks/benefit is more clearly in favor of AC at that point.     Start Eliquis for now    Cards consult    Check TSH     DVT prophylaxis: as above  Code status: Full  Requires inpatient level of care  Manpreet Moore MD    10:34 AM  2/19/2022

## 2022-02-19 NOTE — ED PROVIDER NOTES
Chief Complaint   Patient presents with    Palpitations     since midnight denies chest pain       57-year-old female with past medical history of hypothyroidism presenting today with chief complaint of palpitations since midnight. She was sleeping peacefully and never experienced this before, and then issues waking from sleep with a feeling of chest tightness that was associated with feelings of her heart beating out of her chest. It has been constant since then, nothing made it better or worse, not associated with lightheadedness, dizziness, chest pain, numbness, tingling, weakness, nausea, vomiting, diaphoresis. She had a recent sinus infection had been on prednisone, otherwise no other inciting events. Review of Systems   Constitutional: Negative for chills and fever. HENT: Negative for ear pain, sinus pressure and sore throat. Eyes: Negative for pain, discharge and redness. Respiratory: Negative for cough, shortness of breath and wheezing. Cardiovascular: Positive for palpitations. Negative for chest pain. Gastrointestinal: Negative for abdominal distention, diarrhea, nausea and vomiting. Genitourinary: Negative for dysuria and frequency. Musculoskeletal: Negative for arthralgias and back pain. Skin: Negative for rash and wound. Neurological: Negative for weakness and headaches. Hematological: Negative for adenopathy. All other systems reviewed and are negative. Physical Exam  Vitals and nursing note reviewed. Constitutional:       Appearance: She is well-developed. HENT:      Head: Normocephalic and atraumatic. Right Ear: External ear normal.      Left Ear: External ear normal.      Nose: Nose normal.      Mouth/Throat:      Mouth: Mucous membranes are moist.      Pharynx: Oropharynx is clear. Eyes:      Pupils: Pupils are equal, round, and reactive to light. Cardiovascular:      Rate and Rhythm: Tachycardia present. Rhythm irregular.       Heart sounds: Normal heart sounds. No murmur heard. Pulmonary:      Effort: Pulmonary effort is normal.      Breath sounds: Normal breath sounds. Abdominal:      General: Bowel sounds are normal.      Palpations: Abdomen is soft. Tenderness: There is no abdominal tenderness. There is no guarding or rebound. Musculoskeletal:         General: No swelling. Cervical back: Normal range of motion and neck supple. Skin:     General: Skin is warm and dry. Neurological:      General: No focal deficit present. Mental Status: She is alert and oriented to person, place, and time. Procedures     EKG: This EKG is signed and interpreted by me. Rate: 162  Rhythm: Atrial fibrillation and Rapid ventricular response  Interpretation: no acute changes, no ST elevations  Comparison: No prior EKG      MDM  Number of Diagnoses or Management Options  Diagnosis management comments: 75-year-old female past medical history of hypothyroidism presented with chief complaint of palpitations since midnight. She is hemodynamically stable although very tachycardic on arrival to emergency department, initial rate was ranging between 1 40-1 68 and EKG demonstrated atrial fibrillation with RVR. Patient has been in atrial fibrillation for. Lab work was unremarkable, she was given 2 L fluid bolus and as her rate did not improve, she was given initial 10 mg diltiazem push. It helped initially but then went into A. fib RVR again. Diltiazem infusion was started and patient was rate controlled after that. Discussed plan for admission and she verbalized understanding. Spoke to Yaya Pena and she admitted the patient under Dr. Olivia Hilton for further management. Patient was given therapeutic Lovenox. ED Course as of 02/19/22 0813   Sat Feb 19, 2022   8045 Patient notes that her chest tightness has improved although it is still slightly present, heart rate is still in the 130s we will titrate up the Cardizem infusion.  [MM]   4057 NKIIL with Emanuel Larkin, admitting for Dr. Edenilson Jackson, they will observe the patient for management. Patient and her daughter verbalized understanding of the plan. [MM]      ED Course User Index  [MM] Chaya Ruano DO        ED Course as of 02/19/22 0813   Sat Feb 19, 2022   8172 Patient notes that her chest tightness has improved although it is still slightly present, heart rate is still in the 130s we will titrate up the Cardizem infusion. [MM]   0533 Spoke with Emanuel Larkin, admitting for Dr. Edenilson Jackson, they will observe the patient for management. Patient and her daughter verbalized understanding of the plan. [MM]      ED Course User Index  [MM] Chaya Ruano DO       --------------------------------------------- PAST HISTORY ---------------------------------------------  Past Medical History:  has a past medical history of Acquired Hypothyroidism, Kidney cysts, Liver cyst, Osteoarthritis, Postmenopausal, Shingles, Thyroid disease, and Trigeminal neuralgia. Past Surgical History:  has a past surgical history that includes Knee arthroscopy (Right, 2017); Colonoscopy (2008); pr colonoscopy w/biopsy single/multiple (N/A, 8/6/2018); Hysterectomy, total abdominal; and joint replacement (Bilateral). Social History:  reports that she has never smoked. She has never used smokeless tobacco. She reports current alcohol use of about 5.0 standard drinks of alcohol per week. She reports that she does not use drugs. Family History: family history includes Arthritis in her mother; Cancer in her mother, paternal aunt, paternal aunt, paternal cousin, and paternal uncle; Heart Disease in her father; High Blood Pressure in her father; High Cholesterol in her father; Stroke in her father and paternal grandfather. The patients home medications have been reviewed.     Allergies: Diclofenac sodium and Sulfa antibiotics    -------------------------------------------------- RESULTS -------------------------------------------------    LABS:  Results for orders placed or performed during the hospital encounter of 02/19/22   Urinalysis with Microscopic   Result Value Ref Range    Color, UA Straw Straw/Yellow    Clarity, UA Clear Clear    Glucose, Ur Negative Negative mg/dL    Bilirubin Urine Negative Negative    Ketones, Urine Negative Negative mg/dL    Specific Gravity, UA 1.015 1.005 - 1.030    Blood, Urine Negative Negative    pH, UA 6.0 5.0 - 9.0    Protein, UA Negative Negative mg/dL    Urobilinogen, Urine 0.2 <2.0 E.U./dL    Nitrite, Urine Negative Negative    Leukocyte Esterase, Urine Negative Negative    WBC, UA NONE 0 - 5 /HPF    RBC, UA NONE 0 - 2 /HPF    Bacteria, UA NONE SEEN None Seen /HPF       RADIOLOGY:  XR CHEST PORTABLE   Final Result   Chronic appearing findings. PA and lateral views would be useful for further   assessment, if symptoms persist.             ------------------------- NURSING NOTES AND VITALS REVIEWED ---------------------------  Date / Time Roomed:  2/19/2022  4:06 AM  ED Bed Assignment:  [id number]    The nursing notes within the ED encounter and vital signs as below have been reviewed.      Patient Vitals for the past 24 hrs:   BP Temp Temp src Pulse Resp SpO2 Height Weight   02/19/22 0636 -- -- -- 115 -- -- -- --   02/19/22 0630 -- -- -- -- -- -- 5' 2\" (1.575 m) 111 lb (50.3 kg)   02/19/22 0612 96/72 97.9 °F (36.6 °C) Oral 115 16 99 % -- --   02/19/22 0545 91/70 -- -- 123 13 96 % -- --   02/19/22 0515 103/77 -- -- 122 16 96 % -- --   02/19/22 0513 -- -- -- -- -- -- -- 111 lb 8.8 oz (50.6 kg)   02/19/22 0500 100/73 -- -- 115 12 97 % -- --   02/19/22 0445 99/67 -- -- 141 15 96 % -- --   02/19/22 0430 92/71 -- -- 143 15 95 % -- --   02/19/22 0415 (!) 111/0 -- -- 132 21 95 % -- --   02/19/22 0409 98/73 97.4 °F (36.3 °C) -- 138 16 96 % -- --       Oxygen Saturation Interpretation: Normal    ------------------------------------------ PROGRESS NOTES ------------------------------------------  Re-evaluation(s):  Time: 1  Patients symptoms are improving  Repeat physical examination is not changed    Counseling:  I have spoken with the patient and discussed todays results, in addition to providing specific details for the plan of care and counseling regarding the diagnosis and prognosis. Their questions are answered at this time and they are agreeable with the plan of admission.    --------------------------------- ADDITIONAL PROVIDER NOTES ---------------------------------  Consultations:  Time: 9113. Spoke with [facility] admitting for Dr. Olivia Hilton. Discussed case. They will admit the patient. This patient's ED course included: a personal history and physicial examination, re-evaluation prior to disposition, multiple bedside re-evaluations, IV medications, cardiac monitoring, continuous pulse oximetry and complex medical decision making and emergency management    This patient has remained hemodynamically stable during their ED course. Diagnosis:  1. Atrial fibrillation with rapid ventricular response (HCC)        Disposition:  Patient's disposition: Admit to telemetry  Patient's condition is stable.            Frankie Moss DO  Resident  02/19/22 1031

## 2022-02-20 VITALS
OXYGEN SATURATION: 97 % | RESPIRATION RATE: 16 BRPM | HEART RATE: 80 BPM | DIASTOLIC BLOOD PRESSURE: 91 MMHG | WEIGHT: 111 LBS | SYSTOLIC BLOOD PRESSURE: 124 MMHG | HEIGHT: 62 IN | BODY MASS INDEX: 20.43 KG/M2 | TEMPERATURE: 97.5 F

## 2022-02-20 LAB
ALBUMIN SERPL-MCNC: 3.6 G/DL (ref 3.5–5.2)
ALP BLD-CCNC: 75 U/L (ref 35–104)
ALT SERPL-CCNC: 22 U/L (ref 0–32)
ANION GAP SERPL CALCULATED.3IONS-SCNC: 8 MMOL/L (ref 7–16)
AST SERPL-CCNC: 19 U/L (ref 0–31)
BASOPHILS ABSOLUTE: 0.02 E9/L (ref 0–0.2)
BASOPHILS RELATIVE PERCENT: 0.4 % (ref 0–2)
BILIRUB SERPL-MCNC: 0.2 MG/DL (ref 0–1.2)
BUN BLDV-MCNC: 13 MG/DL (ref 6–23)
CALCIUM SERPL-MCNC: 9 MG/DL (ref 8.6–10.2)
CHLORIDE BLD-SCNC: 104 MMOL/L (ref 98–107)
CO2: 24 MMOL/L (ref 22–29)
CREAT SERPL-MCNC: 0.7 MG/DL (ref 0.5–1)
EOSINOPHILS ABSOLUTE: 0.29 E9/L (ref 0.05–0.5)
EOSINOPHILS RELATIVE PERCENT: 5.1 % (ref 0–6)
GFR AFRICAN AMERICAN: >60
GFR NON-AFRICAN AMERICAN: >60 ML/MIN/1.73
GLUCOSE BLD-MCNC: 87 MG/DL (ref 74–99)
HCT VFR BLD CALC: 40.5 % (ref 34–48)
HEMOGLOBIN: 12.7 G/DL (ref 11.5–15.5)
IMMATURE GRANULOCYTES #: 0.01 E9/L
IMMATURE GRANULOCYTES %: 0.2 % (ref 0–5)
LV EF: 58 %
LVEF MODALITY: NORMAL
LYMPHOCYTES ABSOLUTE: 1.41 E9/L (ref 1.5–4)
LYMPHOCYTES RELATIVE PERCENT: 24.8 % (ref 20–42)
MAGNESIUM: 2 MG/DL (ref 1.6–2.6)
MCH RBC QN AUTO: 29.4 PG (ref 26–35)
MCHC RBC AUTO-ENTMCNC: 31.4 % (ref 32–34.5)
MCV RBC AUTO: 93.8 FL (ref 80–99.9)
MONOCYTES ABSOLUTE: 0.57 E9/L (ref 0.1–0.95)
MONOCYTES RELATIVE PERCENT: 10 % (ref 2–12)
NEUTROPHILS ABSOLUTE: 3.38 E9/L (ref 1.8–7.3)
NEUTROPHILS RELATIVE PERCENT: 59.5 % (ref 43–80)
PDW BLD-RTO: 14.2 FL (ref 11.5–15)
PLATELET # BLD: 215 E9/L (ref 130–450)
PMV BLD AUTO: 9.8 FL (ref 7–12)
POTASSIUM REFLEX MAGNESIUM: 4.5 MMOL/L (ref 3.5–5)
RBC # BLD: 4.32 E12/L (ref 3.5–5.5)
SODIUM BLD-SCNC: 136 MMOL/L (ref 132–146)
TOTAL PROTEIN: 5.9 G/DL (ref 6.4–8.3)
WBC # BLD: 5.7 E9/L (ref 4.5–11.5)

## 2022-02-20 PROCEDURE — 83735 ASSAY OF MAGNESIUM: CPT

## 2022-02-20 PROCEDURE — 85025 COMPLETE CBC W/AUTO DIFF WBC: CPT

## 2022-02-20 PROCEDURE — 6370000000 HC RX 637 (ALT 250 FOR IP): Performed by: NURSE PRACTITIONER

## 2022-02-20 PROCEDURE — 80053 COMPREHEN METABOLIC PANEL: CPT

## 2022-02-20 PROCEDURE — 36415 COLL VENOUS BLD VENIPUNCTURE: CPT

## 2022-02-20 PROCEDURE — 93308 TTE F-UP OR LMTD: CPT

## 2022-02-20 PROCEDURE — 99232 SBSQ HOSP IP/OBS MODERATE 35: CPT | Performed by: INTERNAL MEDICINE

## 2022-02-20 PROCEDURE — 6370000000 HC RX 637 (ALT 250 FOR IP): Performed by: INTERNAL MEDICINE

## 2022-02-20 PROCEDURE — 2580000003 HC RX 258: Performed by: NURSE PRACTITIONER

## 2022-02-20 RX ORDER — DIGOXIN 0.06 MG/1
62.5 TABLET ORAL DAILY
Qty: 30 TABLET | Refills: 3 | Status: SHIPPED | OUTPATIENT
Start: 2022-02-21 | End: 2022-02-21 | Stop reason: RX

## 2022-02-20 RX ORDER — DIGOXIN 125 MCG
62.5 TABLET ORAL DAILY
Status: DISCONTINUED | OUTPATIENT
Start: 2022-02-21 | End: 2022-02-20 | Stop reason: HOSPADM

## 2022-02-20 RX ADMIN — LEVOTHYROXINE SODIUM 50 MCG: 0.05 TABLET ORAL at 06:40

## 2022-02-20 RX ADMIN — APIXABAN 5 MG: 5 TABLET, FILM COATED ORAL at 08:33

## 2022-02-20 RX ADMIN — Medication 10 ML: at 09:00

## 2022-02-20 RX ADMIN — METOPROLOL TARTRATE 25 MG: 25 TABLET, FILM COATED ORAL at 08:33

## 2022-02-20 RX ADMIN — APIXABAN 5 MG: 5 TABLET, FILM COATED ORAL at 20:04

## 2022-02-20 RX ADMIN — METOPROLOL TARTRATE 25 MG: 25 TABLET, FILM COATED ORAL at 20:03

## 2022-02-20 ASSESSMENT — PAIN SCALES - GENERAL
PAINLEVEL_OUTOF10: 0
PAINLEVEL_OUTOF10: 0

## 2022-02-20 NOTE — PROGRESS NOTES
Attempted calling echo dept several times to try to get procedure done, but there was no answer.     Evan España RN 02/20/22 1:49 PM

## 2022-02-20 NOTE — PROGRESS NOTES
Dr. Immanuel Downey made aware over the phone that Dr. Loren Whitt said that patient is okay for discharge.     Lakeisha Mattson RN 02/20/22 6:20 PM

## 2022-02-20 NOTE — PROGRESS NOTES
INPATIENT CARDIOLOGY FOLLOW-UP    Name: Ebony Whitehead    Age: 57 y.o. Date of Admission: 2/19/2022  4:06 AM    Date of Service: 2/20/2022    Chief Complaint: Follow-up for New onset A. fib with RVR    Interim History:  No new overnight cardiac complaints. She converted to sinus rhythm around 5:30 PM yesterday 2 hours after receiving digoxin. Currently with no complaints of CP, SOB, palpitations, dizziness, or lightheadedness. SR on telemetry. Review of Systems:   Cardiac: As per HPI  General: No fever, chills  Pulmonary: As per HPI  HEENT: No visual disturbances, difficult swallowing  GI: No nausea, vomiting  Endocrine: No thyroid disease or DM  Musculoskeletal: QUIÑONEZ x 4, no focal motor deficits  Skin: Intact, no rashes  Neuro/Psych: No headache or seizures    Problem List:  Patient Active Problem List   Diagnosis    Hemangioma of liver    Arthritis    Acquired hypothyroidism    Postmenopausal    Trigeminal neuralgia    Suspected COVID-19 virus infection    Other fatigue    Varicose veins of right lower extremity with pain    New onset atrial fibrillation ([address]    Atrial fibrillation with rapid ventricular response (HCC)       Allergies:   Allergies   Allergen Reactions    Diclofenac Sodium Rash    Sulfa Antibiotics Rash       Current Medications:  Current Facility-Administered Medications   Medication Dose Route Frequency Provider Last Rate Last Admin    levothyroxine (SYNTHROID) tablet 50 mcg  50 mcg Oral Daily Sherrill Forget, APRN - CNP   50 mcg at 02/20/22 0640    sodium chloride flush 0.9 % injection 5-40 mL  5-40 mL IntraVENous 2 times per day Sherrill Forget, APRN - CNP   10 mL at 02/19/22 2031    sodium chloride flush 0.9 % injection 5-40 mL  5-40 mL IntraVENous PRN Sherrill Forget, APRN - CNP        0.9 % sodium chloride infusion  25 mL IntraVENous PRN Sherrill Forget, APRN - CNP        ondansetron (ZOFRAN-ODT) disintegrating tablet 4 mg  4 mg Oral Q8H PRN Sherrill Forget, APRN - CNP        Or    ondansetron (ZOFRAN) injection 4 mg  4 mg IntraVENous Q6H PRN Ramon Linda, APRN - CNP        acetaminophen (TYLENOL) tablet 650 mg  650 mg Oral Q6H PRN Ramon Linda, APRN - CNP        Or    acetaminophen (TYLENOL) suppository 650 mg  650 mg Rectal Q6H PRN Ramon Linda, APRN - CNP        potassium chloride (KLOR-CON M) extended release tablet 40 mEq  40 mEq Oral PRN Ramon Linda, APRN - CNP        Or    potassium bicarb-citric acid (EFFER-K) effervescent tablet 40 mEq  40 mEq Oral PRN Ramon Linda, APRN - CNP        Or    potassium chloride 10 mEq/100 mL IVPB (Peripheral Line)  10 mEq IntraVENous PRN Ramon Linda, APRN - CNP        magnesium sulfate 2000 mg in 50 mL IVPB premix  2,000 mg IntraVENous PRN Ramon Linda, APRN - CNP        senna (SENOKOT) tablet 8.6 mg  1 tablet Oral Daily PRN Ramon Linda, APRN - CNP        apixaban (ELIQUIS) tablet 5 mg  5 mg Oral BID Bety Dawkins MD   5 mg at 02/20/22 4577    perflutren lipid microspheres (DEFINITY) injection 1.65 mg  1.5 mL IntraVENous ONCE PRN Bety Dawkins MD        dilTIAZem 100 mg in dextrose 5 % 100 mL infusion ([facility])  5-15 mg/hr IntraVENous Continuous Bety Dawkins MD   Stopped at 02/19/22 1843    metoprolol tartrate (LOPRESSOR) tablet 25 mg  25 mg Oral BID Smita Pantelis, APRN - CNP   25 mg at 02/20/22 1342    digoxin (LANOXIN) tablet 125 mcg  125 mcg Oral Daily Smita Pantelis, APRN - CNP         Facility-Administered Medications Ordered in Other Encounters   Medication Dose Route Frequency Provider Last Rate Last Admin    iohexol (OMNIPAQUE 240) injection 50 mL  50 mL Oral ONCE PRN Leslie Roque MD          sodium chloride      dilTIAZem (CARDIZEM) 100 mg in dextrose 5% 100 mL ([facility]) Stopped (02/19/22 1843)       Physical Exam:  BP (!) 143/85   Pulse 54   Temp 97.9 °F (36.6 °C) (Oral)   Resp 16   Ht 5' 2\" (1.575 m)   Wt 111 lb (50.3 kg)   LMP 01/01/2005   SpO2 100%   BMI 20.30 kg/m²   Wt Readings from Last 3 Encounters:   02/19/22 111 lb (50.3 kg)   10/13/21 111 lb 9.6 oz (50.6 kg)   05/07/21 114 lb (51.7 kg)     Appearance: Awake, alert, no acute respiratory distress  Skin: Intact, no rash  Head: Normocephalic, atraumatic  Eyes: EOMI, no conjunctival erythema  ENMT: No pharyngeal erythema, MMM, no rhinorrhea  Neck: Supple, no elevated JVP, no carotid bruits  Lungs: Clear to auscultation bilaterally. No wheezes, rales, or rhonchi. Cardiac: Regular rate and rhythm, +S1S2, no murmurs apparent  Abdomen: Soft, nontender, +bowel sounds  Extremities: Moves all extremities x 4, no lower extremity edema  Neurologic: No focal motor deficits apparent, normal mood and affect  Peripheral Pulses: Intact posterior tibial pulses bilaterally    Intake/Output:  No intake or output data in the 24 hours ending 02/20/22 0927  No intake/output data recorded.     Laboratory Tests:  Recent Labs     02/19/22  0300 02/20/22  0715    136   K 4.5 4.5    104   CO2 25 24   BUN 18 13   CREATININE 0.6 0.7   GLUCOSE 132* 87   CALCIUM 9.3 9.0     Lab Results   Component Value Date    MG 2.0 02/20/2022     Recent Labs     02/19/22  0300 02/20/22  0715   ALKPHOS 91 75   ALT 22 22   AST 31 19   PROT 7.2 5.9*   BILITOT <0.2 0.2   LABALBU 4.4 3.6     Recent Labs     02/19/22  0300 02/20/22  0715   WBC 8.2 5.7   RBC 4.77 4.32   HGB 14.3 12.7   HCT 44.4 40.5   MCV 93.1 93.8   MCH 30.0 29.4   MCHC 32.2 31.4*   RDW 13.9 14.2    215   MPV 9.9 9.8     No results found for: CKTOTAL, CKMB, CKMBINDEX, TROPONINI  Lab Results   Component Value Date    INR 0.9 02/19/2022    PROTIME 9.5 02/19/2022     Lab Results   Component Value Date    TSH 2.320 02/19/2022     No results found for: LABA1C  No results found for: EAG  Lab Results   Component Value Date    CHOL 217 (H) 10/05/2021    CHOL 220 (H) 07/14/2021    CHOL 209 (H) 04/27/2019     Lab Results   Component Value Date    TRIG 80 10/05/2021    TRIG 85 07/14/2021 TRIG 76 04/27/2019     Lab Results   Component Value Date     10/05/2021    HDL 95 07/14/2021    HDL 89 04/27/2019     Lab Results   Component Value Date    LDLCALC 97 10/05/2021    LDLCALC 108 (H) 07/14/2021    LDLCALC 105 (H) 04/27/2019     Lab Results   Component Value Date    LABVLDL 16 10/05/2021    LABVLDL 15 12/19/2017    LABVLDL 19 02/05/2016     No results found for: CHOLHDLRATIO    Cardiac Tests:  Telemetry findings reviewed:  atrial fibrillation with heart rate in the 90s to 110s no new tachy/bradyarrhythmias overnight     EKG: A. fib with RVR with occasional premature ventricular complex, nonspecific ST-T changes, abnormal EKG.    Vitals and labs were reviewed: Blood pressure 102/85 heart rate 110 O2 sats normal     Labs-BMP normal.  proBNP 180 troponin, high-sensitivity 9, AST/ALT 3122 TSH 2.3 Free T4 1.2 Free T3 2.1 CBC normal, urinalysis normal.  SARS-CoV-2 is negative.       Chest x-ray:  Chronic appearing findings.  PA and lateral views would be useful for further   assessment, if symptoms persist.      TTE-2/20/2021:  Summary   Normal left ventricle size and systolic function. Ejection fraction is visually estimated at 55-60%. No regional wall motion abnormalities seen. Normal left ventricle wall thickness. Indeterminate diastolic function. Normal right ventricular size and function. TAPSE 23 mm. Mild mitral regurgitation is present. No hemodynamically significant aortic stenosis is present. Unable to estimate PA systolic pressure.    No evidence for hemodynamically significant pericardial effusion.     Assessment:  · New onset A. fib with RVR>> spontaneous converted to normal sinus rhythm and remains in sinus rhythm for at least 20 hours  · [id number] Vasc score 0 has bled score 0  · No history of any hypertension diabetes, CAD, CVA, CHF  · With hypothyroidism on HRT  · History of trigeminal neuralgia  · Status post bilateral hip replacements           Plan:   · Continue metoprolol tartrate 25 mg p.o. daily and digoxin. We will decrease digoxin to 62.5 mg p.o. daily due to some episodes of bradycardia with heart rate dropping to 50s. · Continue Eliquis 5 mg p.o. twice daily for anticoagulation. · Echo results were reviewed and showed normal LV and RV function with no significant valvular heart disease. Echo results were discussed with the patient. · She is stable from the cardiology standpoint for discharge. Patient was advised to follow-up with me in the office in 1 month. · If she develops any further episodes of atrial fibrillation then will consider doing a Lexiscan nuclear stress test to rule out ischemia for antiarrhythmic therapy selection. We will sign off, please call if having further questions or concerns. Wilmer Peña MD., Dmitry Ghotra.   [facility]) Cardiology

## 2022-02-20 NOTE — PROGRESS NOTES
Chief Complaint:  Chief Complaint   Patient presents with    Palpitations     since midnight denies chest pain     New onset atrial fibrillation ([address]     Subjective:    Feeling a lot better, back in NSR, no CP/palp    Objective:    /68   Pulse 60   Temp 97.7 °F (36.5 °C) (Infrared)   Resp 16   Ht 5' 2\" (1.575 m)   Wt 111 lb (50.3 kg)   LMP 01/01/2005   SpO2 99%   BMI 20.30 kg/m²     Current medications that patient is taking have been reviewed.     General appearance: NAD, conversant  HEENT: AT/NC, MMM  Neck: FROM, supple  Lungs: Clear to auscultation, WOB normal  CV: RRR, no MRGs  Abdomen: Soft, non-tender; no masses or HSM, +BS  Extremities: No peripheral edema or digital cyanosis  Skin: no rash, lesions or ulcers  Psych: Calm and cooperative  Neuro: Alert and interactive, face symmetric, moving all extremities, speech fluent    Labs:  CBC with Differential:    Lab Results   Component Value Date    WBC 5.7 02/20/2022    RBC 4.32 02/20/2022    HGB 12.7 02/20/2022    HCT 40.5 02/20/2022     02/20/2022    MCV 93.8 02/20/2022    MCH 29.4 02/20/2022    MCHC 31.4 02/20/2022    RDW 14.2 02/20/2022    SEGSPCT 80 08/27/2013    LYMPHOPCT 24.8 02/20/2022    MONOPCT 10.0 02/20/2022    BASOPCT 0.4 02/20/2022    MONOSABS 0.57 02/20/2022    LYMPHSABS 1.41 02/20/2022    EOSABS 0.29 02/20/2022    BASOSABS 0.02 02/20/2022     CMP:    Lab Results   Component Value Date     02/20/2022    K 4.5 02/20/2022     02/20/2022    CO2 24 02/20/2022    BUN 13 02/20/2022    CREATININE 0.7 02/20/2022    GFRAA >60 02/20/2022    LABGLOM >60 02/20/2022    GLUCOSE 87 02/20/2022    PROT 5.9 02/20/2022    LABALBU 3.6 02/20/2022    CALCIUM 9.0 02/20/2022    BILITOT 0.2 02/20/2022    ALKPHOS 75 02/20/2022    AST 19 02/20/2022    ALT 22 02/20/2022          Telemetry:  I've personally reviewed the patient's telemetry:  NSR    Assessment/Plan:  Principal Problem:    New onset atrial fibrillation (HCC)  Active Problems: Acquired hypothyroidism    Atrial fibrillation with rapid ventricular response (HCC)  Resolved Problems:    * No resolved hospital problems.  *       Awaiting TTE    Start metoprolol    Consider stopping digoxin    Continue Eliquis    DVT prophylaxis: as above  Requires continued inpatient level of care     Carol Keita MD    1:31 PM  2/20/2022

## 2022-02-21 ENCOUNTER — CARE COORDINATION (OUTPATIENT)
Dept: OTHER | Facility: CLINIC | Age: 62
End: 2022-02-21

## 2022-02-21 ENCOUNTER — TELEPHONE (OUTPATIENT)
Dept: ADMINISTRATIVE | Age: 62
End: 2022-02-21

## 2022-02-21 DIAGNOSIS — I48.91 ATRIAL FIBRILLATION, UNSPECIFIED TYPE (HCC): Primary | ICD-10-CM

## 2022-02-21 RX ORDER — DIGOXIN 125 MCG
62.5 TABLET ORAL DAILY
Qty: 45 TABLET | Refills: 3 | Status: SHIPPED
Start: 2022-02-21 | End: 2022-03-18 | Stop reason: SDUPTHER

## 2022-02-21 RX ORDER — DIGOXIN 125 MCG
62.5 TABLET ORAL DAILY
COMMUNITY
End: 2022-02-21 | Stop reason: SDUPTHER

## 2022-02-21 NOTE — TELEPHONE ENCOUNTER
Follow up in one month, I decreased the dose to 62.5 mcg po daily. Arrange for a 14 day monitor to monitor her rhythm and any episodes of Afib.

## 2022-02-21 NOTE — CARE COORDINATION
Cory 45 Transitions Initial Follow Up Call    Call within 2 business days of discharge: Yes    Patient: Srinivasan Esparza Patient : 1960   MRN: [id number]  Reason for Admission: A-fib  Discharge Date: 22 RARS: Readmission Risk Score: 5.1 ( )      Last Discharge [facility]       Complaint Diagnosis Description Type Department Provider    22 Palpitations Atrial fibrillation with rapid ventricular response [facility]) ED to Hosp-Admission (Discharged) (ADMITTED) Misty Schaffer MD; Lauren Samayoa,... Was this an external facility discharge? No Discharge Facility: [address] [place] [address] to be reviewed by the provider   Additional needs identified to be addressed with provider No  none       Method of communication with provider : chart routing    Discussed COVID-19 related testing which was Pending at this time. Test results were pending. Patient informed of results, if available? Pending    Advance Care Planning:   Does patient have an Advance Directive:  reviewed and current. Was this a readmission? No  Patient stated reason for admission: Palpatations  Patients top risk factors for readmission: medical condition-New onset A-fib  medication management    Associate Care Manager [facility]) contacted the patientby telephone to perform post hospital discharge assessment. Verified name and  with patientas identifiers. Provided introduction to self, and explanation of the ACM role. ACM reviewed discharge instructions, medical action plan and red flags with patient who verbalized understanding. Patient given an opportunity to ask questions and does not have any further questions or concerns at this time. Were discharge instructions available to patient? Yes.  Reviewed appropriate site of care based on symptoms and resources available to patient including: PCP, Specialist, Benefits related nurse triage line, Urgent care clinics, When to call [phone] and [employer]. The patient agrees to contact the PCP office for questions related to their healthcare. Medication reconciliation was performed with patient, who verbalizes understanding of administration of home medications. Advised obtaining a 90-day supply of all daily and as-needed medications. Patient reports she has been doing well since discharge. She has not had any further episodes of palpitations. She has been waiting on a call back to clarify a medications. Discussed medications and new digoxin prescription ordered. She will contact he pharmacy to see if the Rx is ready/ clarified there. Patient verbalized understanding. Patient denies any N/V/D and is able to describe red flag symptoms to report. She states her daughter did buy her a BP home monitor and  She has been using to monitor BP and HR. She is able to describe BP and HR goal ranges. She has her follow up appts scheduled with her PCP and cardiologist.  She states she has reliable transportation to her appts. Pt is agreeable to follow up contact. Education provided:   [employer] symptoms to report  Self monitoring compliance  Medication compliance  Provider follow up appointment compliance  Ordered diagnostic testing compliance  Utilization of appropriate level of care: Right Care, Right Place, Right Time  Reinforced Discharge instructions    Covid Risk Education    Patient has following risk factors of: no known risk factors. Education provided regarding infection prevention, and signs and symptoms of COVID-19 and when to seek medical attention with patient who verbalized understanding. Discussed exposure protocols and quarantine From CDC: Are you at higher risk for severe illness?   and given an opportunity for questions and concerns. The patient agrees to contact the COVID-19 hotline [phone] or PCP office for questions related to COVID-19.      For more information on steps you can take to protect yourself, see CDC's How to Protect Yourself     Was patient discharged with a pulse oximeter? No     Discussed follow-up appointments. If no appointment was previously scheduled, appointment scheduling offered: No, already scheduled   Is follow up appointment scheduled within 7 days of discharge? No  [facility] follow up appointment(s):   Future Appointments   Date Time Provider Libertad Barnes   3/2/2022 10:30 AM Ruth Ashford MD [facility]   3/18/2022  2:00 PM MD Kristen Pedro[address] [place] [address]   4/13/2022  9:30 AM [facility]     [facility] follow up appointment(s): None    Plan for follow-up call in 5-7 days based on severity of symptoms and risk factors. Plan for next call:   Medication compliance  Provider follow up appointment compliance    ACM provided contact information for future needs.     Care Transitions 24 Hour Call    Schedule Follow Up Appointment with PCP: Completed  Do you have any ongoing symptoms?: No  Do you have a copy of your discharge instructions?: Yes  Do you have all of your prescriptions and are they filled?: Yes  Have you been contacted by a [facility] Pharmacist?: Yes  Have you scheduled your follow up appointment?: Yes  How are you going to get to your appointment?: Car - drive self  Were you discharged with any Home Care or Post Acute Services: No  Do you feel like you have everything you need to keep you well at home?: Yes  Care Transitions Interventions    Pharmacist: Completed      Specialty Service Referral: Completed           Follow Up  Future Appointments   Date Time Provider Libertad Barnes   3/2/2022 10:30 AM Ruth Ashford MD [facility]   3/18/2022  2:00 PM Suma Jacobo MD [address] [place] [address]   4/13/2022  9:30 AM Ruth Ashford MD [facility] Chris Harris RN

## 2022-02-21 NOTE — TELEPHONE ENCOUNTER
Per Dr. Pooja Garcia note from 2/20/22, patient needs (1) month F/U. Patient notified of Dr. Pooja Garcia recommendation. F/U scheduled for 3/18/22 at 2:00 p.m. Please confirm Digoxin dose, patient thought it was supposed to be discontinued due to bradycardia.

## 2022-02-21 NOTE — DISCHARGE SUMMARY
Physician Discharge Summary     Patient ID:  Amanda Waldron  [id number]  24 y.o.  1960    Admit date: 2/19/2022    Discharge date and time:  2/20/2022     Admission Diagnoses:   Chief Complaint   Patient presents with    Palpitations     since midnight denies chest pain      New onset atrial fibrillation [facility])     Discharge Diagnoses:   Principal Problem:    New onset atrial fibrillation ([facility] [address]  Active Problems:    Acquired hypothyroidism    Atrial fibrillation with rapid ventricular response ([facility] [address]  Resolved Problems:    * No resolved hospital problems. *       Consults: cardiology    Procedures: none    Hospital Course:   Patient presented with new onset atrial fibrillation with RVR. She was started on diltiazem drip. She eventually converted to normal sinus rhythm. She has a [id number] score of 1. She was started on Eliquis. Cardiology transitioned her to metoprolol and a low-dose of digoxin. I am not really sure why digoxin was chosen but she can follow-up with cardiology as outpatient and that he sorted out. Her echocardiogram did not show any major issues.      Discharge Exam:  Vitals:    02/19/22 2029 02/20/22 0010 02/20/22 0638 02/20/22 0830   BP: 104/68 119/71 (!) 143/85 125/68   Pulse: 65 55 54 60   Resp: 16  16 16   Temp: 98.2 °F (36.8 °C)  97.9 °F (36.6 °C) 97.7 °F (36.5 °C)   TempSrc: Oral  Oral Infrared   SpO2: 98% 98% 100% 99%   Weight:       Height:            General appearance: NAD, conversant  HEENT: AT/NC, MMM  Neck: FROM, supple  Lungs: Clear to auscultation, WOB normal  CV: RRR, no MRGs  Abdomen: Soft, non-tender; no masses or HSM, +BS  Extremities: No peripheral edema or digital cyanosis  Skin: no rash, lesions or ulcers  Psych: Calm and cooperative  Neuro: Alert and interactive, nonfocal     Condition:  Stable    Disposition: home    Patient Instructions:   Current Discharge Medication List      START taking these medications    Details   apixaban (ELIQUIS) 5 MG TABS tablet Take 1 tablet by mouth 2 times daily  Qty: 60 tablet, Refills: 3      metoprolol tartrate (LOPRESSOR) 25 MG tablet Take 1 tablet by mouth 2 times daily  Qty: 60 tablet, Refills: 3      digoxin 62.5 MCG TABS Take 62.5 mcg by mouth daily  Qty: 30 tablet, Refills: 3         CONTINUE these medications which have NOT CHANGED    Details   fluticasone (FLONASE) 50 MCG/ACT nasal spray Use 2 sprays each nostril QMWF, off other days  Qty: 3 each, Refills: 0    Comments: 11/9/2020: Disregard previous prescriptions and refills; honor this prescription and refills  Associated Diagnoses: Acute sinusitis, recurrence not specified, unspecified location      senna (SENOKOT) 8.6 MG tablet Take 2 tablets by mouth daily Indications: Constipation  Qty: 60 tablet, Refills: 11    Comments: 10/31/21: OTC  Associated Diagnoses: Chronic idiopathic constipation      estradiol (ESTRACE) 0.5 MG tablet Take 1 tablet by mouth daily  Qty: 90 tablet, Refills: 3    Comments: 1/1/22: DISREGARD PREVIOUS PRESCRIPTIONS AND REFILLS; HONOR THIS PRESCRIPTION AND REFILLS. FILL ON OR AFTER 1/1/22  Associated Diagnoses: Postmenopausal symptoms      levothyroxine (SYNTHROID) 50 MCG tablet Take 1 tablet by mouth daily  Qty: 90 tablet, Refills: 3    Comments: 11/15/21: DISREGARD PREVIOUS PRESCRIPTIONS AND REFILLS; HONOR THIS PRESCRIPTION AND REFILLS.  FILL ON OR AFTER 11/15/21  Associated Diagnoses: Acquired hypothyroidism      Multiple Vitamins-Minerals (THERAPEUTIC MULTIVITAMIN-MINERALS) tablet Take 1 tablet by mouth daily      Ascorbic Acid (VITAMIN C) 500 MG CAPS Take 2 tablets by mouth daily       calcium citrate-vitamin D (CITRICAL + D) 315-250 MG-UNIT TABS per tablet Take 1 tablet by mouth 2 times daily (with meals)         STOP taking these medications       amoxicillin-clavulanate (AUGMENTIN) 875-125 MG per tablet Comments:   Reason for Stopping:         methylPREDNISolone (MEDROL, TAMMY,) 4 MG tablet Comments:   Reason for Stopping:             Activity: activity as tolerated  Diet: regular diet    Follow-up with PCP in 1 week.     Note that over 30 minutes was spent in preparing discharge papers, discussing discharge with patient, medication review, etc.    Signed:  Octaviano Newby MD    2/20/2022  7:41 PM

## 2022-02-21 NOTE — TELEPHONE ENCOUNTER
Patient notified of Dr. Hills's recommendations. Digoxin e-scribed. Monitor and  F/U scheduled accordingly.

## 2022-02-21 NOTE — TELEPHONE ENCOUNTER
Pt called to schedule HFU w/ Shannon Ramírez, [address] 2/20, please advise pt at [phone]

## 2022-02-22 LAB
EKG ATRIAL RATE: 58 BPM
EKG P AXIS: 67 DEGREES
EKG P-R INTERVAL: 116 MS
EKG Q-T INTERVAL: 436 MS
EKG QRS DURATION: 84 MS
EKG QTC CALCULATION (BAZETT): 428 MS
EKG R AXIS: 56 DEGREES
EKG T AXIS: 68 DEGREES
EKG VENTRICULAR RATE: 58 BPM

## 2022-02-22 PROCEDURE — 93010 ELECTROCARDIOGRAM REPORT: CPT | Performed by: INTERNAL MEDICINE

## 2022-02-23 ENCOUNTER — NURSE ONLY (OUTPATIENT)
Dept: CARDIOLOGY CLINIC | Age: 62
End: 2022-02-23

## 2022-02-23 NOTE — PROGRESS NOTES
Patient was in today for 14 day ZIO AT monitor per . Patient was given instructions and questions answered, verbalize understanding.      Serial number: [id number]    Bethanie Barthel, MA

## 2022-03-02 ENCOUNTER — OFFICE VISIT (OUTPATIENT)
Dept: FAMILY MEDICINE CLINIC | Age: 62
End: 2022-03-02
Payer: COMMERCIAL

## 2022-03-02 VITALS
SYSTOLIC BLOOD PRESSURE: 102 MMHG | HEIGHT: 62 IN | DIASTOLIC BLOOD PRESSURE: 70 MMHG | HEART RATE: 58 BPM | RESPIRATION RATE: 16 BRPM | WEIGHT: 115 LBS | BODY MASS INDEX: 21.16 KG/M2 | TEMPERATURE: 97.2 F | OXYGEN SATURATION: 98 %

## 2022-03-02 DIAGNOSIS — I48.91 NEW ONSET ATRIAL FIBRILLATION (HCC): ICD-10-CM

## 2022-03-02 DIAGNOSIS — I48.91 ATRIAL FIBRILLATION WITH RAPID VENTRICULAR RESPONSE (HCC): ICD-10-CM

## 2022-03-02 DIAGNOSIS — Z09 HOSPITAL DISCHARGE FOLLOW-UP: Primary | ICD-10-CM

## 2022-03-02 PROCEDURE — 99495 TRANSJ CARE MGMT MOD F2F 14D: CPT | Performed by: FAMILY MEDICINE

## 2022-03-02 PROCEDURE — 1111F DSCHRG MED/CURRENT MED MERGE: CPT | Performed by: FAMILY MEDICINE

## 2022-03-02 RX ORDER — AMOXICILLIN 500 MG/1
CAPSULE ORAL
COMMUNITY
Start: 2022-02-25 | End: 2022-03-18 | Stop reason: ALTCHOICE

## 2022-03-02 SDOH — ECONOMIC STABILITY: FOOD INSECURITY: WITHIN THE PAST 12 MONTHS, YOU WORRIED THAT YOUR FOOD WOULD RUN OUT BEFORE YOU GOT MONEY TO BUY MORE.: NEVER TRUE

## 2022-03-02 SDOH — ECONOMIC STABILITY: FOOD INSECURITY: WITHIN THE PAST 12 MONTHS, THE FOOD YOU BOUGHT JUST DIDN'T LAST AND YOU DIDN'T HAVE MONEY TO GET MORE.: NEVER TRUE

## 2022-03-02 ASSESSMENT — ENCOUNTER SYMPTOMS
CONSTIPATION: 0
SORE THROAT: 0
SHORTNESS OF BREATH: 0
NAUSEA: 0
COUGH: 0
WHEEZING: 0
DIARRHEA: 0
BLOOD IN STOOL: 0
BACK PAIN: 0
ABDOMINAL PAIN: 0
VOMITING: 0

## 2022-03-02 ASSESSMENT — PATIENT HEALTH QUESTIONNAIRE - PHQ9
SUM OF ALL RESPONSES TO PHQ9 QUESTIONS 1 & 2: 0
1. LITTLE INTEREST OR PLEASURE IN DOING THINGS: 0
2. FEELING DOWN, DEPRESSED OR HOPELESS: 0
SUM OF ALL RESPONSES TO PHQ QUESTIONS 1-9: 0

## 2022-03-02 ASSESSMENT — SOCIAL DETERMINANTS OF HEALTH (SDOH): HOW HARD IS IT FOR YOU TO PAY FOR THE VERY BASICS LIKE FOOD, HOUSING, MEDICAL CARE, AND HEATING?: NOT HARD AT ALL

## 2022-03-02 NOTE — PROGRESS NOTES
started on diltiazem drip. She eventually converted to normal sinus rhythm. She has a [id number] score of 1. She was started on Eliquis. Cardiology transitioned her to metoprolol and a low-dose of digoxin. I am not really sure why digoxin was chosen but she can follow-up with cardiology as outpatient and that he sorted out.   Her echocardiogram did not show any major issues.        Discharge Exam:  Vitals  Vitals:    02/19/22 2029 02/20/22 0010 02/20/22 0638 02/20/22 0830  BP: 104/68 119/71 (!) 143/85 125/68  Pulse: 65 55 54 60  Resp: 16   16 16  Temp: 98.2 °F (36.8 °C)   97.9 °F (36.6 °C) 97.7 °F (36.5 °C)  TempSrc: Oral   Oral Infrared  SpO2: 98% 98% 100% 99%  Weight:          Height:                  General appearance: NAD, conversant  HEENT: AT/NC, MMM  Neck: FROM, supple  Lungs: Clear to auscultation, WOB normal  CV: RRR, no MRGs  Abdomen: Soft, non-tender; no masses or HSM, +BS  Extremities: No peripheral edema or digital cyanosis  Skin: no rash, lesions or ulcers  Psych: Calm and cooperative  Neuro: Alert and interactive, nonfocal      Condition:  Stable     Disposition: home     Patient Instructions:        Current Discharge Medication List          START taking these medications    Details  apixaban (ELIQUIS) 5 MG TABS tablet Take 1 tablet by mouth 2 times daily  Qty: 60 tablet, Refills: 3     metoprolol tartrate (LOPRESSOR) 25 MG tablet Take 1 tablet by mouth 2 times daily  Qty: 60 tablet, Refills: 3     digoxin 62.5 MCG TABS Take 62.5 mcg by mouth daily  Qty: 30 tablet, Refills: 3             CONTINUE these medications which have NOT CHANGED    Details  fluticasone (FLONASE) 50 MCG/ACT nasal spray Use 2 sprays each nostril QMWF, off other days  Qty: 3 each, Refills: 0    Comments: 11/9/2020: Disregard previous prescriptions and refills; honor this prescription and refills  Associated Diagnoses: Acute sinusitis, recurrence not specified, unspecified location     senna (SENOKOT) 8.6 MG tablet Take 2 tablets by mouth daily Indications: Constipation  Qty: 60 tablet, Refills: 11    Comments: 10/31/21: OTC  Associated Diagnoses: Chronic idiopathic constipation     estradiol (ESTRACE) 0.5 MG tablet Take 1 tablet by mouth daily  Qty: 90 tablet, Refills: 3    Comments: 1/1/22: DISREGARD PREVIOUS PRESCRIPTIONS AND REFILLS; HONOR THIS PRESCRIPTION AND REFILLS. FILL ON OR AFTER 1/1/22  Associated Diagnoses: Postmenopausal symptoms     levothyroxine (SYNTHROID) 50 MCG tablet Take 1 tablet by mouth daily  Qty: 90 tablet, Refills: 3    Comments: 11/15/21: DISREGARD PREVIOUS PRESCRIPTIONS AND REFILLS; HONOR THIS PRESCRIPTION AND REFILLS. FILL ON OR AFTER 11/15/21  Associated Diagnoses: Acquired hypothyroidism     Multiple Vitamins-Minerals (THERAPEUTIC MULTIVITAMIN-MINERALS) tablet Take 1 tablet by mouth daily     Ascorbic Acid (VITAMIN C) 500 MG CAPS Take 2 tablets by mouth daily      calcium citrate-vitamin D (CITRICAL + D) 315-250 MG-UNIT TABS per tablet Take 1 tablet by mouth 2 times daily (with meals)            STOP taking these medications       amoxicillin-clavulanate (AUGMENTIN) 875-125 MG per tablet Comments:   Reason for Stopping:          methylPREDNISolone (MEDROL, TAMMY,) 4 MG tablet Comments:   Reason for Stopping:              Activity: activity as tolerated  Diet: regular diet     Follow-up with PCP in 1 week. Inpatient course: Discharge summary reviewed- see chart. Interval history/Current status:  Symptoms have resolved. She believes that a trial of methylprednisolone may have been the precipitating factor. Under the care of Cardiology (Dr. Shannon Plascencia) and is currently wearing a 14 day event monitor.       [id number] Score for Atrial Fibrillation Stroke Risk   Risk   Factors  Component Value   C CHF No 0   H HTN No 0   A2 Age >= 75 No,  (60 y.o.) 0   D DM No 0   S2 Prior Stroke/TIA No 0   V Vascular Disease No 0   A Age 74-69 No,  (60 y.o.) 0   Sc Sex female 1    [id number]  Score  1   Score last updated 3/2/22 91:73 AM EST    Click here for a link to the UpToDate guideline \"Atrial Fibrillation: Anticoagulation therapy to prevent embolization    Disclaimer: Risk Score calculation is dependent on accuracy of patient problem list and past encounter diagnosis. Current medications reviewed. She does complain of being tired since  Starting these new medications.       Patient Active Problem List   Diagnosis    Hemangioma of liver    Arthritis    Acquired hypothyroidism    Postmenopausal    Trigeminal neuralgia    Suspected COVID-19 virus infection    Other fatigue    Varicose veins of right lower extremity with pain    New onset atrial fibrillation (HCC)    Atrial fibrillation with rapid ventricular response (HCC)       Medications listed as ordered at the time of discharge from hospital  [unfilled]     Medications marked \"taking\" at this time  Outpatient Medications Marked as Taking for the 3/2/22 encounter (Office Visit) with Zeus Ortiz MD   Medication Sig Dispense Refill    digoxin (LANOXIN) 125 MCG tablet Take 0.5 tablets by mouth daily 45 tablet 3    apixaban (ELIQUIS) 5 MG TABS tablet Take 1 tablet by mouth 2 times daily 60 tablet 3    metoprolol tartrate (LOPRESSOR) 25 MG tablet Take 1 tablet by mouth 2 times daily 60 tablet 3    fluticasone (FLONASE) 50 MCG/ACT nasal spray Use 2 sprays each nostril QMWF, off other days 3 each 0    estradiol (ESTRACE) 0.5 MG tablet Take 1 tablet by mouth daily 90 tablet 3    senna (SENOKOT) 8.6 MG tablet Take 2 tablets by mouth daily Indications: Constipation 60 tablet 11    levothyroxine (SYNTHROID) 50 MCG tablet Take 1 tablet by mouth daily 90 tablet 3    Multiple Vitamins-Minerals (THERAPEUTIC MULTIVITAMIN-MINERALS) tablet Take 1 tablet by mouth daily      Ascorbic Acid (VITAMIN C) 500 MG CAPS Take 2 tablets by mouth daily       calcium citrate-vitamin D (CITRICAL + D) 315-250 MG-UNIT TABS per tablet Take 1 tablet by mouth 2 times daily (with meals)          Medications patient taking as of now reconciled against medications ordered at time of hospital discharge: Yes    Review of Systems   HENT: Negative for congestion, ear pain and sore throat. Respiratory: Negative for cough, shortness of breath and wheezing. Cardiovascular: Negative for chest pain, palpitations and leg swelling. Gastrointestinal: Negative for abdominal pain, blood in stool, constipation, diarrhea, nausea and vomiting. Genitourinary: Negative for dysuria, frequency, hematuria and urgency. Musculoskeletal: Negative for back pain, myalgias and neck pain. Skin: Negative for rash. Neurological: Negative for dizziness, weakness and headaches. Psychiatric/Behavioral: The patient is not nervous/anxious. Objective:    /70   Pulse 58   Temp 97.2 °F (36.2 °C) (Infrared)   Resp 16   Ht 5' 2\" (1.575 m)   Wt 115 lb (52.2 kg)   LMP 01/01/2005   SpO2 98%   BMI 21.03 kg/m²   Physical Exam  Constitutional:       General: She is not in acute distress. Appearance: She is well-developed. She is not diaphoretic. HENT:      Head: Normocephalic and atraumatic. Right Ear: External ear normal.      Left Ear: External ear normal.      Mouth/Throat:      Pharynx: No oropharyngeal exudate. Eyes:      General: No scleral icterus. Right eye: No discharge. Conjunctiva/sclera: Conjunctivae normal.      Pupils: Pupils are equal, round, and reactive to light. Neck:      Thyroid: No thyromegaly. Cardiovascular:      Rate and Rhythm: Normal rate and regular rhythm. Heart sounds: Normal heart sounds. No murmur heard. Pulmonary:      Effort: Pulmonary effort is normal. No respiratory distress. Breath sounds: No stridor. No wheezing or rales. Chest:      Chest wall: No tenderness. Abdominal:      General: Bowel sounds are normal. There is no distension. Palpations: Abdomen is soft. There is no mass.       Tenderness: There is no abdominal tenderness. There is no guarding. Musculoskeletal:         General: No tenderness. Normal range of motion. Cervical back: Normal range of motion and neck supple. Lymphadenopathy:      Cervical: No cervical adenopathy. Skin:     General: Skin is warm and dry. Coloration: Skin is not pale. Findings: No erythema or rash. Neurological:      Mental Status: She is alert and oriented to person, place, and time. Psychiatric:         Behavior: Behavior normal.         Thought Content: Thought content normal.         Assessment / Plan:      Anuradha Nunes was seen today for follow-up from hospital.    Diagnoses and all orders for this visit:    Hospital discharge follow-up: Status post new onset atrial fibrillation. She is symptom free and exam is WDL  -     AR DISCHARGE MEDS RECONCILED W/ CURRENT OUTPATIENT MED LIST    New onset atrial fibrillation ([facility] [address]): She is symptom free and exam is WDL  -     AR DISCHARGE MEDS RECONCILED W/ CURRENT OUTPATIENT MED LIST    Atrial fibrillation with rapid ventricular response [facility]): She is symptom free and exam is WDL  -     AR DISCHARGE MEDS RECONCILED W/ CURRENT OUTPATIENT MED LIST    Continue to follow with Dr. Abraham Ortiz as directed. Follow Up:  Return if symptoms worsen or fail to improve, for Keep scheduled appointment(s). Cody Dacosta MD    An electronic signature was used to authenticate this note.   --Cody Dacosta MD

## 2022-03-11 ENCOUNTER — CARE COORDINATION (OUTPATIENT)
Dept: OTHER | Facility: CLINIC | Age: 62
End: 2022-03-11

## 2022-03-11 NOTE — CARE COORDINATION
Cory 45 Transitions Follow Up Call    3/11/2022    Patient: Vani Childers  Patient : 1960   MRN: [id number]  Reason for Admission: A-fib w/ RVR  Discharge Date: 22 RARS: Readmission Risk Score: 5.1 ( )       ACM contacted patient by telephone to follow up on progress, reinforce previous education/ provide patient education, and discuss any new issues or concerns. HIPAA compliant message left requesting a return phone call at patients convenience to discuss. Will continue to follow. Care Transitions Subsequent and Final Call    Schedule Follow Up Appointment with PCP: Completed  Subsequent and Final Calls  Care Transitions Interventions    Pharmacist: Completed      Specialty Service Referral: Completed    Other Interventions:            Follow Up  Future Appointments   Date Time Provider Libertad Barnes   3/18/2022  2:00 PM Carlos Cam MD [facility]   2022  9:30 AM Vignesh Dorantes MD [facility]       Bridgett Genao RN

## 2022-03-15 ENCOUNTER — TELEPHONE (OUTPATIENT)
Dept: CARDIOLOGY CLINIC | Age: 62
End: 2022-03-15

## 2022-03-15 DIAGNOSIS — I48.91 ATRIAL FIBRILLATION, UNSPECIFIED TYPE (HCC): ICD-10-CM

## 2022-03-15 NOTE — TELEPHONE ENCOUNTER
----- Message from Araceli Saenz MD sent at 3/15/2022  1:13 PM EDT -----  Her monitor showed NSR with occasional extra beats and few runs of arrhythmia (SVT) about 5 beats long. Follow up with me as scheduled on 3/18/22.

## 2022-03-18 ENCOUNTER — OFFICE VISIT (OUTPATIENT)
Dept: CARDIOLOGY CLINIC | Age: 62
End: 2022-03-18
Payer: COMMERCIAL

## 2022-03-18 VITALS
WEIGHT: 113.3 LBS | RESPIRATION RATE: 16 BRPM | DIASTOLIC BLOOD PRESSURE: 80 MMHG | BODY MASS INDEX: 20.85 KG/M2 | HEART RATE: 60 BPM | SYSTOLIC BLOOD PRESSURE: 114 MMHG | HEIGHT: 62 IN

## 2022-03-18 DIAGNOSIS — I48.91 NEW ONSET ATRIAL FIBRILLATION (HCC): Primary | ICD-10-CM

## 2022-03-18 PROCEDURE — 99214 OFFICE O/P EST MOD 30 MIN: CPT | Performed by: INTERNAL MEDICINE

## 2022-03-18 PROCEDURE — 93000 ELECTROCARDIOGRAM COMPLETE: CPT | Performed by: INTERNAL MEDICINE

## 2022-03-18 RX ORDER — DIGOXIN 125 MCG
62.5 TABLET ORAL DAILY
Qty: 45 TABLET | Refills: 3 | Status: SHIPPED
Start: 2022-03-18 | End: 2022-04-13 | Stop reason: SDUPTHER

## 2022-03-18 NOTE — PROGRESS NOTES
OUTPATIENT CARDIOLOGY FOLLOW-UP    Name: Josh Apodaca    Age: 57 y.o. Primary Care Physician: Margarito Mayberry MD    Date of Service: 3/18/2022    Chief Complaint: No chief complaint on file. Interim History:   Mrs. Shaq Camejo is a 59-year-old  female with history of hypothyroidism and HRT, trigeminal neuralgia, bilateral hip replacements, COVID-19 infection in 2020 not hospitalized: Lifetime non-smoker and denies any history of hypertension, CHF, CVA, MIs, diabetes. She presented to the emergency room with intermittent episodes of palpitations since Friday. She was diagnosed with this sinus congestion on 2/17/2022 and was initiated on Augmentin and Medrol Dosepak. She started having severe symptoms and palpitations. She also noticed mild chest discomfort or pressure when she turned to her left side while laying in the bed. She denied chest pain with exertion, dizziness, lightheadedness, orthopnea, PND, or dyspnea with exertion. Her daughter who is a nurse anesthetist checked on her and brought her to the emergency room due to fast heart rate for further evaluation. In the emergency room her blood pressure was 98/73 she was afebrile. Chest x-ray showed no acute findings EKG showed A. fib with RVR. Lab work showed creatinine 1.6 and WBC 8.2 hemoglobin 14.3 proBNP 180. She was initiated on therapeutic Lovenox and was started on Eliquis. She also received 10 mg IV Cardizem followed by Cardizem infusion     Patient was seen as a new consult on 2/19/2022 and was diagnosed with a new onset atrial fibrillation with RVR. She had a [id number] Vasc score of 0 has bled score 0. She was started on digoxin and metoprolol and she converted to sinus rhythm. She had an echocardiogram which showed normal LV and RV function. No significant valvular heart disease was noted. She had a 14-day Holter monitor which showed predominant sinus rhythm with a few episodes of SVT otherwise no episodes of A. fib.  She is still on Eliquis 5 mg p.o. twice daily and digoxin 62.5 mcg p.o. daily and metoprolol 25 mg p.o. twice daily. She told me that she sometimes feels tired after she was initiated on these medications. She denies any bleeding episodes. She is compliant with medications, as well as salt and fluid intake. She does not take any over-the-counter arthritis medications. No new cardiac complaints since last cardiology evaluation. She denies recent chest pain, SOB, palpitations, lightheadedness, dizziness, syncope, PND, or orthopnea. SR on EKG. Review of Systems:   Cardiac: As per HPI  General: No fever, chills  Pulmonary: As per HPI  HEENT: No visual disturbances, difficult swallowing  GI: No nausea, vomiting  Endocrine: No thyroid disease or DM  Musculoskeletal: QUIÑONEZ x 4, no focal motor deficits  Skin: Intact, no rashes  Neuro/Psych: No headache or seizures    Past Medical History:  Past Medical History:   Diagnosis Date    Acquired Hypothyroidism     Kidney cysts     Liver cyst     Osteoarthritis     Postmenopausal     Shingles 2012    Thyroid disease     Trigeminal neuralgia 12/8/2019       Past Surgical History:  Past Surgical History:   Procedure Laterality Date    COLONOSCOPY  2008    HYSTERECTOMY, TOTAL ABDOMINAL      JOINT REPLACEMENT Bilateral     KNEE ARTHROSCOPY Right 2017    GA COLONOSCOPY W/BIOPSY SINGLE/MULTIPLE N/A 8/6/2018    COLONOSCOPY WITH BIOPSY performed by Tessa Mcgowan [facility] at [facility] ENDOSCOPY       Family History:  Family History   Problem Relation Age of Onset    Cancer Mother         bone, esophagus    Arthritis Mother     Heart Disease Father     Stroke Father     High Blood Pressure Father     High Cholesterol Father     Cancer Paternal Aunt         breast    Cancer Paternal Uncle         prostate    Stroke Paternal Grandfather     Cancer Paternal Aunt         breast    Cancer Paternal Cousin         Breast cancer don't know age per pt.        Social History:  Social History     Socioeconomic History    Marital status:      Spouse name: Not on file    Number of children: Not on file    Years of education: Not on file    Highest education level: Not on file   Occupational History    Not on file   Tobacco Use    Smoking status: Never Smoker    Smokeless tobacco: Never Used   Vaping Use    Vaping Use: Never used   Substance and Sexual Activity    Alcohol use: Yes     Alcohol/week: 5.0 standard drinks     Types: 5 Glasses of wine per week     Comment: 3/1/17:  enjoys going to wineries on weekends with     Drug use: No    Sexual activity: Not Currently     Partners: Male     Comment: 3/1/7:  no secondary to impotence   Other Topics Concern    Not on file   Social History Narrative    Not on file     Social Determinants of Health     Financial Resource Strain: Low Risk     Difficulty of Paying Living Expenses: Not hard at all   Food Insecurity: No Food Insecurity    Worried About [address] in the Last Year: Never true    [facility] in the Last Year: Never true   Transportation Needs:     Lack of Transportation (Medical): Not on file    Lack of Transportation (Non-Medical):  Not on file   Physical Activity:     Days of Exercise per Week: Not on file    Minutes of Exercise per Session: Not on file   Stress:     Feeling of Stress : Not on file   Social Connections:     Frequency of Communication with Friends and Family: Not on file    Frequency of Social Gatherings with Friends and Family: Not on file    Attends [employer] Services: Not on file    Active Member of Clubs or Organizations: Not on file    Attends Club or Organization Meetings: Not on file    Marital Status: Not on file   Intimate Partner Violence:     Fear of Current or Ex-Partner: Not on file    Emotionally Abused: Not on file    Physically Abused: Not on file    Sexually Abused: Not on file   Housing Stability:     Unable to Pay for Housing in the Last Year: Not on file    Number of Places Lived in the Last Year: Not on file    Unstable Housing in the Last Year: Not on file       Allergies: Allergies   Allergen Reactions    Diclofenac Sodium Rash    Sulfa Antibiotics Rash       Current Medications:  Current Outpatient Medications   Medication Sig Dispense Refill    digoxin (LANOXIN) 125 MCG tablet Take 0.5 tablets by mouth daily 45 tablet 3    apixaban (ELIQUIS) 5 MG TABS tablet Take 1 tablet by mouth 2 times daily 60 tablet 3    metoprolol tartrate (LOPRESSOR) 25 MG tablet Take 1 tablet by mouth 2 times daily 60 tablet 3    estradiol (ESTRACE) 0.5 MG tablet Take 1 tablet by mouth daily 90 tablet 3    senna (SENOKOT) 8.6 MG tablet Take 2 tablets by mouth daily Indications: Constipation 60 tablet 11    levothyroxine (SYNTHROID) 50 MCG tablet Take 1 tablet by mouth daily 90 tablet 3    Multiple Vitamins-Minerals (THERAPEUTIC MULTIVITAMIN-MINERALS) tablet Take 1 tablet by mouth daily      Ascorbic Acid (VITAMIN C) 500 MG CAPS Take 2 tablets by mouth daily       calcium citrate-vitamin D (CITRICAL + D) 315-250 MG-UNIT TABS per tablet Take 1 tablet by mouth 2 times daily (with meals)      amoxicillin (AMOXIL) 500 MG capsule  (Patient not taking: Reported on 3/18/2022)      fluticasone (FLONASE) 50 MCG/ACT nasal spray Use 2 sprays each nostril QMWF, off other days (Patient not taking: Reported on 3/18/2022) 3 each 0     No current facility-administered medications for this visit.      Facility-Administered Medications Ordered in Other Visits   Medication Dose Route Frequency Provider Last Rate Last Admin    iohexol (OMNIPAQUE 240) injection 50 mL  50 mL Oral ONCE PRN Bridget Whitney MD           Physical Exam:  /80   Pulse 60   Resp 16   Ht 5' 2\" (1.575 m)   Wt 113 lb 4.8 oz (51.4 kg)   LMP 01/01/2005   BMI 20.72 kg/m²   Wt Readings from Last 3 Encounters:   03/18/22 113 lb 4.8 oz (51.4 kg)   03/02/22 115 lb (52.2 kg)   02/19/22 111 lb (50.3 kg)     Appearance: Awake, alert and oriented x 3, no acute respiratory distress  Skin: Intact, no rash  Head: Normocephalic, atraumatic  Eyes: EOMI, no conjunctival erythema  ENMT: No pharyngeal erythema, MMM, no rhinorrhea  Neck: Supple, no elevated JVP, no carotid bruits  Lungs: Clear to auscultation bilaterally. No wheezes, rales, or rhonchi.   Cardiac: Regular rate and rhythm, +S1S2, no murmurs apparent  Abdomen: Soft, nontender, +bowel sounds  Extremities: Moves all extremities x 4, no lower extremity edema  Neurologic: No focal motor deficits apparent, normal mood and affect, alert and oriented x 3  Peripheral Pulses: Intact posterior tibial pulses bilaterally    Laboratory Tests:  Lab Results   Component Value Date    CREATININE 0.7 02/20/2022    BUN 13 02/20/2022     02/20/2022    K 4.5 02/20/2022     02/20/2022    CO2 24 02/20/2022     Lab Results   Component Value Date    MG 2.0 02/20/2022     Lab Results   Component Value Date    WBC 5.7 02/20/2022    HGB 12.7 02/20/2022    HCT 40.5 02/20/2022    MCV 93.8 02/20/2022     02/20/2022     Lab Results   Component Value Date    ALT 22 02/20/2022    AST 19 02/20/2022    ALKPHOS 75 02/20/2022    BILITOT 0.2 02/20/2022     No results found for: CKTOTAL, CKMB, CKMBINDEX, TROPONINI  Lab Results   Component Value Date    INR 0.9 02/19/2022    PROTIME 9.5 02/19/2022     Lab Results   Component Value Date    TSH 2.320 02/19/2022     No results found for: LABA1C  No results found for: EAG  Lab Results   Component Value Date    CHOL 217 (H) 10/05/2021    CHOL 220 (H) 07/14/2021    CHOL 209 (H) 04/27/2019     Lab Results   Component Value Date    TRIG 80 10/05/2021    TRIG 85 07/14/2021    TRIG 76 04/27/2019     Lab Results   Component Value Date     10/05/2021    HDL 95 07/14/2021    HDL 89 04/27/2019     Lab Results   Component Value Date    LDLCALC 97 10/05/2021    LDLCALC 108 (H) 07/14/2021    LDLCALC 105 (H) 04/27/2019     Lab Results Component Value Date    LABVLDL 16 10/05/2021    LABVLDL 15 12/19/2017    LABVLDL 19 02/05/2016     No results found for: CHOLHDLRATIO    Cardiac Tests:  ECG: Normal sinus rhythm short NM interval low voltage QRS complexes, abnormal EKG      Echocardiogram 2/20/2022:     Normal left ventricle size and systolic function. Ejection fraction is visually estimated at 55-60%. No regional wall motion abnormalities seen. Normal left ventricle wall thickness. Indeterminate diastolic function. Normal right ventricular size and function. TAPSE 23 mm. Mild mitral regurgitation is present. No hemodynamically significant aortic stenosis is present. Unable to estimate PA systolic pressure. No evidence for hemodynamically significant pericardial effusion. Stress test:        Cardiac catheterization:     The ASCVD Risk score (Priscilla Quinones., et al., 2013) failed to calculate for the following reasons: The valid HDL cholesterol range is 20 to 100 mg/dL        ASSESSMENT:  · Paroxysmal atrial fibrillation, spontaneously converted to sinus rhythm and remains in sinus. · [id number] Vasc score 0, has bled score 0  · Hypothyroidism on HRT  · History of trigeminal neuralgia  · Status post bilateral hip replacements    Plan:   · 14-day Holter monitor results were reviewed. Baseline rhythm is sinus rhythm with a few episodes of SVT in premature atrial ventricular complexes. No A. fib burden. · Based on her [id number] Vasc score she is at low risk for thromboembolic events. She was advised to discontinue Eliquis for now and monitor rhythm with a Kardia device. · Continue metoprolol tartrate 25 mg p.o. twice daily and digoxin 62.5 mg p.o. daily  · Follow-up with me in 6 months. The patient's current medication list, allergies, problem list and results of all previously ordered testing were reviewed at today's visit.   Jacy Hubbard MD  [facility]) Cardiology

## 2022-03-18 NOTE — PATIENT INSTRUCTIONS
· 14-day Holter monitor results were reviewed. Baseline rhythm is sinus rhythm with a few episodes of SVT in premature atrial ventricular complexes. No A. fib burden. · Based on her [id number] Vasc score she is at low risk for thromboembolic events. She was advised to discontinue Eliquis for now and monitor rhythm with a Kardia device. · Continue metoprolol tartrate 25 mg p.o. twice daily and digoxin 62.5 mg p.o. daily  · Follow-up with me in 6 months.

## 2022-03-24 ENCOUNTER — CARE COORDINATION (OUTPATIENT)
Dept: OTHER | Facility: CLINIC | Age: 62
End: 2022-03-24

## 2022-03-24 NOTE — CARE COORDINATION
[facility] Transitions Follow Up Call    3/24/2022    Patient: Socorro Angela  Patient : 1960   MRN: [id number]  Reason for Admission: A-fib  Discharge Date: 22 RARS: Readmission Risk Score: 5.1 ( )       ACM contacted patient by telephone to follow up on progress, reinforce previous education/ provide patient education, and discuss any new issues or concerns. Spoke with patient's  and he states patient is not available at this time. He requested I send a [employer] message to reach out to patient. Will send [employer] message and continue to follow. Care Transitions Subsequent and Final Call    Schedule Follow Up Appointment with PCP: Completed  Subsequent and Final Calls  Care Transitions Interventions    Pharmacist: Completed      Specialty Service Referral: Completed    Other Interventions:            Follow Up  Future Appointments   Date Time Provider Department Center   2022  9:30 AM Lorenzo Burkett MD [facility] Leonor Graff RN

## 2022-03-29 ENCOUNTER — CARE COORDINATION (OUTPATIENT)
Dept: OTHER | Facility: CLINIC | Age: 62
End: 2022-03-29

## 2022-03-29 NOTE — CARE COORDINATION
[facility] Transitions Follow Up Call    3/29/2022    Patient: Fartun Ramírez  Patient : 1960   MRN: [id number]  Reason for Admission: A-fib  Discharge Date: 22 RARS: Readmission Risk Score: 5.1 ( )         ACM contacted patient by telephone to follow up on progress, reinforce previous education/ provide patient education, and discuss any new issues or concerns. HIPAA compliant message left requesting a return phone call at patients convenience to discuss. Will continue to follow. Care Transitions Subsequent and Final Call    Schedule Follow Up Appointment with PCP: Completed  Subsequent and Final Calls  Care Transitions Interventions    Pharmacist: Completed      Specialty Service Referral: Completed    Other Interventions:            Follow Up  Future Appointments   Date Time Provider Department Center   2022  9:30 AM Zeus Ortiz MD [facility] Paolo Pate RN

## 2022-04-04 ENCOUNTER — CARE COORDINATION (OUTPATIENT)
Dept: OTHER | Facility: CLINIC | Age: 62
End: 2022-04-04

## 2022-04-04 NOTE — CARE COORDINATION
[facility] Transitions Follow Up Call    2022    Patient: Brennon Hart  Patient : 1960   MRN: [id number]  Reason for Admission: A-fib  Discharge Date: 22 RARS: Readmission Risk Score: 5.1 ( )       Associate Care Manager [facility]) contacted the patient by telephone to follow up on progress, reinforce previous education, and discuss any new issues or concerns. ACM left HIPPA compliant message requesting a return call if any immediate or ongoing ACM needs. Discussed follow-up appointments. If no appointment was previously scheduled, appointment scheduling offered: No, already scheduled  [facility] follow up appointment(s):   Future Appointments   Date Time Provider [place]   2022  9:30 AM Andre Malave MD [facility]     [facility] follow up appointment(s): None    No further outreach scheduled with this ACM, ACM will sign off care team at this time. Episode of Care resolved. Patient has this ACM's contact information if future needs arise. Care Transitions Subsequent and Final Call    Schedule Follow Up Appointment with PCP: Completed  Subsequent and Final Calls  Care Transitions Interventions    Pharmacist: Completed      Specialty Service Referral: Completed    Other Interventions:            Follow Up  Future Appointments   Date Time Provider Department [place]   2022  9:30 AM Andre Malave MD [facility] Erickson Chaudhary RN

## 2022-04-13 ENCOUNTER — OFFICE VISIT (OUTPATIENT)
Dept: FAMILY MEDICINE CLINIC | Age: 62
End: 2022-04-13
Payer: COMMERCIAL

## 2022-04-13 VITALS
TEMPERATURE: 97.6 F | HEART RATE: 72 BPM | RESPIRATION RATE: 16 BRPM | OXYGEN SATURATION: 98 % | SYSTOLIC BLOOD PRESSURE: 112 MMHG | HEIGHT: 62 IN | WEIGHT: 112 LBS | BODY MASS INDEX: 20.61 KG/M2 | DIASTOLIC BLOOD PRESSURE: 68 MMHG

## 2022-04-13 DIAGNOSIS — Z76.0 ENCOUNTER FOR MEDICATION REFILL: Primary | ICD-10-CM

## 2022-04-13 DIAGNOSIS — E03.9 ACQUIRED HYPOTHYROIDISM: ICD-10-CM

## 2022-04-13 DIAGNOSIS — N95.9 POSTMENOPAUSAL SYMPTOMS: ICD-10-CM

## 2022-04-13 DIAGNOSIS — I48.91 ATRIAL FIBRILLATION WITH RAPID VENTRICULAR RESPONSE (HCC): ICD-10-CM

## 2022-04-13 DIAGNOSIS — J01.90 ACUTE SINUSITIS, RECURRENCE NOT SPECIFIED, UNSPECIFIED LOCATION: ICD-10-CM

## 2022-04-13 PROCEDURE — 99214 OFFICE O/P EST MOD 30 MIN: CPT | Performed by: FAMILY MEDICINE

## 2022-04-13 RX ORDER — ESTRADIOL 0.5 MG/1
0.5 TABLET ORAL DAILY
Qty: 90 TABLET | Refills: 3 | Status: SHIPPED | OUTPATIENT
Start: 2022-04-13 | End: 2023-04-13

## 2022-04-13 RX ORDER — FLUTICASONE PROPIONATE 50 MCG
SPRAY, SUSPENSION (ML) NASAL
Qty: 3 EACH | Refills: 3 | Status: SHIPPED
Start: 2022-04-13 | End: 2022-07-01

## 2022-04-13 RX ORDER — LEVOTHYROXINE SODIUM 0.05 MG/1
50 TABLET ORAL DAILY
Qty: 90 TABLET | Refills: 3 | Status: SHIPPED | OUTPATIENT
Start: 2022-04-13 | End: 2023-04-13

## 2022-04-13 RX ORDER — DIGOXIN 125 MCG
62.5 TABLET ORAL DAILY
Qty: 45 TABLET | Refills: 3
Start: 2022-04-13 | End: 2022-07-01

## 2022-04-13 ASSESSMENT — ENCOUNTER SYMPTOMS
CONSTIPATION: 1
DOUBLE VISION: 0
SPUTUM PRODUCTION: 0
VOMITING: 0
BLOOD IN STOOL: 0
ABDOMINAL PAIN: 0
BACK PAIN: 0
BLURRED VISION: 0
COUGH: 0
SORE THROAT: 0
DIARRHEA: 0
SHORTNESS OF BREATH: 0
WHEEZING: 0
ORTHOPNEA: 0
HEARTBURN: 0
NAUSEA: 0

## 2022-04-13 NOTE — PROGRESS NOTES
Kiesha Schroeder is a 57 y.o. female who presents today for     Chief Complaint   Patient presents with    Other     6 month follow up,  top of left foot feels numb and tingly at night, wakes up at night,  x 2months, seems to be getting a little worse      She is treated for hypothyroidism and Postmenopausal symptoms. She also has a history of chronic constipation since her hysterectomy in 2005. She was also diagnosed with paroxysmal atrial fibrillation with RVR approximately 6 months ago. She remains under the care of cardiology (Dr. Marco A Pedroza) and is on metoprolol and digoxin. She reports that she tolerates these medications well and has no side effects. Hypothyroidism:  Patient with history chronic hypothyroidism. This is  generally controlled on current medication regimen (Synthroid 50 mcg/day). Takes medication as directed and tolerates well. Symptoms from thyroid standpoint include constipation. Most recent labs reviewed with patient and are not remarkable. Thyroid function in particular is  controlled. Thyroid ultrasound has not been done in the past and is not remarkable. Thyroid antibodies have not been done in the past and are not remarkable. Patient does not have exposure to radiation and/or iodine in the past.    Lab Results   Component Value Date    TSH 2.320 02/19/2022       Postmenopausal:   S/P ELLEN/BSO 2005 for non malignant etiology. She has been under the care of GYN (DR. Abhijit Nixon). She has been on estradiol 1 mg/day. She reports that attempts in the past to taper dose/discontinue medication have resulted in recurrence of postmenopausal symptoms. After further discussion, pros of continuing Estradiol for symptom management outweigh potential risks    Abnormal Mammogram, Left Breast:  Mammography and associated studies from 5/21 reviewed.  A six month follow up of left breast was recommended; orders placed      [address]:  Patient's past medical, surgical, social and/or family history reviewed, updated in chart, and are non-contributory (unless otherwise stated). Medications and allergies also reviewed and updated in chart. Review of Systems  Review of Systems   Constitutional: Negative for malaise/fatigue and weight loss. HENT: Negative for congestion, ear pain and sore throat. Eyes: Negative for blurred vision and double vision. Respiratory: Negative for cough, sputum production, shortness of breath and wheezing. Cardiovascular: Negative for chest pain, palpitations, orthopnea and leg swelling. Gastrointestinal: Positive for constipation (Taking Senna daily since 2005.). Negative for abdominal pain, blood in stool, diarrhea, heartburn, nausea and vomiting. Genitourinary: Negative for dysuria, frequency, hematuria and urgency. Musculoskeletal: Negative for back pain, joint pain, myalgias and neck pain. Skin: Negative for itching and rash. Neurological: Positive for dizziness (reports orthostatis with rapid position change) and tingling (dorsum of right foot at night intermittent). Negative for focal weakness, weakness and headaches. Psychiatric/Behavioral: Negative for depression, memory loss and substance abuse. The patient is not nervous/anxious and does not have insomnia. Physical Exam:    VS:    /68   Pulse 72   Temp 97.6 °F (36.4 °C) (Infrared)   Resp 16   Ht 5' 2\" (1.575 m)   Wt 112 lb (50.8 kg)   LMP 01/01/2005   SpO2 98%   BMI 20.49 kg/m²   LAST WEIGHT:  Wt Readings from Last 3 Encounters:   04/13/22 112 lb (50.8 kg)   03/18/22 113 lb 4.8 oz (51.4 kg)   03/02/22 115 lb (52.2 kg)     BMI Readings from Last 3 Encounters:   04/13/22 20.49 kg/m²   03/18/22 20.72 kg/m²   03/02/22 21.03 kg/m²       Physical Exam  Constitutional:       General: She is not in acute distress. Appearance: She is well-developed. She is not diaphoretic. HENT:      Head: Normocephalic and atraumatic.       Right Ear: External ear normal.      Left Ear: External ear normal.      Mouth/Throat:      Pharynx: No oropharyngeal exudate. Eyes:      General: No scleral icterus. Right eye: No discharge. Conjunctiva/sclera: Conjunctivae normal.      Pupils: Pupils are equal, round, and reactive to light. Neck:      Thyroid: No thyromegaly. Cardiovascular:      Rate and Rhythm: Normal rate and regular rhythm. Heart sounds: Normal heart sounds. No murmur heard. Pulmonary:      Effort: Pulmonary effort is normal. No respiratory distress. Breath sounds: No stridor. No wheezing or rales. Chest:      Chest wall: No tenderness. Abdominal:      General: Bowel sounds are normal. There is no distension. Palpations: Abdomen is soft. There is no mass. Tenderness: There is no abdominal tenderness. There is no guarding. Musculoskeletal:         General: No tenderness. Normal range of motion. Cervical back: Normal range of motion and neck supple. Comments: Swelling of several MCP joints without significant pain, loss of strength or sensation   Lymphadenopathy:      Cervical: No cervical adenopathy. Skin:     General: Skin is warm and dry. Coloration: Skin is not pale. Findings: No erythema or rash. Neurological:      Mental Status: She is alert and oriented to person, place, and time. Psychiatric:         Behavior: Behavior normal.         Thought Content:  Thought content normal.         Labs:  Lab Results   Component Value Date     02/20/2022    K 4.5 02/20/2022     02/20/2022    CO2 24 02/20/2022    BUN 13 02/20/2022    CREATININE 0.7 02/20/2022    PROT 5.9 02/20/2022    LABALBU 3.6 02/20/2022    CALCIUM 9.0 02/20/2022    GFRAA >60 02/20/2022    LABGLOM >60 02/20/2022    GLUCOSE 87 02/20/2022    AST 19 02/20/2022    ALT 22 02/20/2022    ALKPHOS 75 02/20/2022    BILITOT 0.2 02/20/2022    TSH 2.320 02/19/2022    CHOL 217 10/05/2021    TRIG 80 10/05/2021     10/05/2021    LDLCALC 97 10/05/2021 Lab Results   Component Value Date    CHOL 217 (H) 10/05/2021    CHOL 220 (H) 07/14/2021    CHOL 209 (H) 04/27/2019     Lab Results   Component Value Date    TRIG 80 10/05/2021    TRIG 85 07/14/2021    TRIG 76 04/27/2019     Lab Results   Component Value Date     10/05/2021    HDL 95 07/14/2021    HDL 89 04/27/2019     Lab Results   Component Value Date    LDLCALC 97 10/05/2021    LDLCALC 108 (H) 07/14/2021    LDLCALC 105 (H) 04/27/2019       No results found for: LABA1C  Lab Results   Component Value Date    LDLCALC 97 10/05/2021    CREATININE 0.7 02/20/2022         Assessment / Plan:      Elisa Kaba was seen today for other. Diagnoses and all orders for this visit:    Encounter for medication refill  -     digoxin (LANOXIN) 125 MCG tablet; Take 0.5 tablets by mouth daily  -     metoprolol tartrate (LOPRESSOR) 25 MG tablet; Take 1 tablet by mouth 2 times daily  -     fluticasone (FLONASE) 50 MCG/ACT nasal spray; Use 2 sprays each nostril QMWF, off other days  -     estradiol (ESTRACE) 0.5 MG tablet; Take 1 tablet by mouth daily  -     levothyroxine (SYNTHROID) 50 MCG tablet; Take 1 tablet by mouth daily    Acute sinusitis, recurrence not specified, unspecified location: As needed use of Flonase  -     fluticasone (FLONASE) 50 MCG/ACT nasal spray; Use 2 sprays each nostril QMWF, off other days    Postmenopausal symptoms: Stable well-controlled with Estrace  -     estradiol (ESTRACE) 0.5 MG tablet; Take 1 tablet by mouth daily    Acquired hypothyroidism: Stable and well controlled with current dose of levothyroxine  -     levothyroxine (SYNTHROID) 50 MCG tablet; Take 1 tablet by mouth daily  -     CBC with Auto Differential; Future  -     Comprehensive Metabolic Panel; Future  -     Lipid Panel; Future  -     T4; Future  -     TSH; Future    Atrial fibrillation with rapid ventricular response [facility]): Under care of cardiology; stable and well controlled  -     digoxin (LANOXIN) 125 MCG tablet;  Take 0.5 tablets by mouth daily  -     metoprolol tartrate (LOPRESSOR) 25 MG tablet; Take 1 tablet by mouth 2 times daily  -     CBC with Auto Differential; Future  -     Comprehensive Metabolic Panel; Future    Call or go to ED immediately if symptoms worsen or persist.    Follow Up:  Return in about 6 months (around 10/13/2022) for Check up, Fasting lab work 1 week prior. , or sooner if necessary. Educational materials printed for patient's review and were included in patient instructions on his/her After Visit Summary and given to patient at the end of visit. Counseled regarding above diagnosis, including possible risks and complications,  especially if left uncontrolled. Counseled regarding the possible side effects, risks, benefits and alternatives to treatment; patient and/or guardian verbalizes understanding, agrees, feels comfortable with and wishes to proceed with above treatment plan. Advised patient to call with any new medication issues, and read all Rx info from pharmacy to assure aware of all possible risks and side effects of medication before taking. Reviewed age and gender appropriate health screening exams and vaccinations. Advised patient regarding importance of keeping up with recommended health maintenance and to schedule as soon as possible if overdue, as this is important in assessing for undiagnosed pathology, especially cancer, as well as protecting against potentially harmful/life threatening disease. Patient and/or guardian verbalizes understanding and agrees with above counseling, assessment and plan. All questions answered.     Thomas García MD

## 2022-04-14 ENCOUNTER — PATIENT MESSAGE (OUTPATIENT)
Dept: FAMILY MEDICINE CLINIC | Age: 62
End: 2022-04-14

## 2022-04-14 DIAGNOSIS — Z12.31 ENCOUNTER FOR SCREENING MAMMOGRAM FOR MALIGNANT NEOPLASM OF BREAST: Primary | ICD-10-CM

## 2022-04-14 NOTE — TELEPHONE ENCOUNTER
From: Ciera Nassar  To: Dr. Yee Sheth  Sent: 4/14/2022 6:18 AM EDT  Subject: Sky Wayne    I just received a reminder letter that my mammogram is due after May 7. Im francisca I did not realize that on my visit with you yesterday. Would you please place an order in for me? I have been going to [facility] for these lately.     Thank you   Elisa Kaba

## 2022-05-20 ENCOUNTER — HOSPITAL ENCOUNTER (OUTPATIENT)
Dept: MAMMOGRAPHY | Age: 62
Discharge: HOME | End: 2022-05-22
Payer: COMMERCIAL

## 2022-05-20 DIAGNOSIS — Z12.31 ENCOUNTER FOR SCREENING MAMMOGRAM FOR MALIGNANT NEOPLASM OF BREAST: ICD-10-CM

## 2022-05-20 PROCEDURE — 77063 BREAST TOMOSYNTHESIS BI: CPT

## 2022-07-01 ENCOUNTER — HOSPITAL ENCOUNTER (EMERGENCY)
Age: 62
Discharge: HOME | End: 2022-07-01
Payer: COMMERCIAL

## 2022-07-01 ENCOUNTER — APPOINTMENT (OUTPATIENT)
Dept: GENERAL RADIOLOGY | Age: 62
End: 2022-07-01
Payer: COMMERCIAL

## 2022-07-01 VITALS
TEMPERATURE: 97 F | HEART RATE: 66 BPM | DIASTOLIC BLOOD PRESSURE: 85 MMHG | WEIGHT: 113 LBS | OXYGEN SATURATION: 98 % | SYSTOLIC BLOOD PRESSURE: 133 MMHG | BODY MASS INDEX: 20.67 KG/M2 | RESPIRATION RATE: 16 BRPM

## 2022-07-01 DIAGNOSIS — M75.21 BICEPS TENDINITIS OF RIGHT UPPER EXTREMITY: ICD-10-CM

## 2022-07-01 DIAGNOSIS — M25.511 ARTHRALGIA OF RIGHT SHOULDER REGION: Primary | ICD-10-CM

## 2022-07-01 PROCEDURE — 99283 EMERGENCY DEPT VISIT LOW MDM: CPT

## 2022-07-01 PROCEDURE — 73030 X-RAY EXAM OF SHOULDER: CPT

## 2022-07-01 ASSESSMENT — PAIN SCALES - GENERAL: PAINLEVEL_OUTOF10: 6

## 2022-07-01 ASSESSMENT — PAIN - FUNCTIONAL ASSESSMENT: PAIN_FUNCTIONAL_ASSESSMENT: 0-10

## 2022-07-01 ASSESSMENT — PAIN DESCRIPTION - DESCRIPTORS: DESCRIPTORS: ACHING;SORE;THROBBING

## 2022-07-01 ASSESSMENT — PAIN DESCRIPTION - ORIENTATION: ORIENTATION: RIGHT

## 2022-07-01 ASSESSMENT — PAIN DESCRIPTION - LOCATION: LOCATION: ARM;SHOULDER

## 2022-07-01 NOTE — ED PROVIDER NOTES
[facility]  Department of Emergency Medicine   ED  Encounter Note  Admit Date/RoomTime: 2022 12:31 PM  ED Room:   NAME: Rafal Bates  : 1960  MRN: [id number]     Chief Complaint:  Arm Pain (right arm pain/shoulder after pain after tearing a paper towel and heard/felt rip in arm on tuesday.)    HISTORY OF PRESENT ILLNESS        Rafal Bates is a 57 y.o. female who presents to the ED with a complaint of right shoulder pain into her right upper arm. Patient states for about 2 weeks she has had an aching pain in her right shoulder. She just attributed it to arthritis. Then 4 days ago she went to tear a paper towel off of the roll and she felt a sharp pop in her right shoulder and right upper arm. Now she is having pain all along the biceps and triceps of the right upper arm. Now every time she goes to use the right upper arm, lift something she has pain up in the muscles to the upper aspect of her right arm. Patient is right-handed. She rates the discomfort a 6 out of 10. At home she has been taking Tylenol with little relief. ROS   Pertinent positives and negatives are stated within HPI, all other systems reviewed and are negative. Past Medical History:  has a past medical history of Acquired Hypothyroidism, Kidney cysts, Liver cyst, Osteoarthritis, Postmenopausal, Shingles, Thyroid disease, and Trigeminal neuralgia. Surgical History:  has a past surgical history that includes Knee arthroscopy (Right, 2017); Colonoscopy (); pr colonoscopy w/biopsy single/multiple (N/A, 2018); Hysterectomy, total abdominal; and joint replacement (Bilateral). Social History:  reports that she has never smoked. She has never used smokeless tobacco. She reports current alcohol use of about 5.0 standard drinks of alcohol per week. She reports that she does not use drugs.     Family History: family history includes Arthritis in her mother; Breast Cancer in her paternal aunt and paternal cousin; Cancer in her mother, paternal aunt, paternal aunt, paternal cousin, and paternal uncle; Heart Disease in her father; High Blood Pressure in her father; High Cholesterol in her father; Stroke in her father and paternal grandfather. Allergies: Diclofenac sodium and Sulfa antibiotics    PHYSICAL EXAM   Oxygen Saturation Interpretation: Normal on room air analysis. ED Triage Vitals   BP Temp Temp src Heart Rate Resp SpO2 Height Weight   07/01/22 1228 07/01/22 1228 -- 07/01/22 1228 07/01/22 1228 07/01/22 1228 -- 07/01/22 1237   133/85 97 °F (36.1 °C)  66 16 98 %  113 lb (51.3 kg)       General:  NAD. Alert and Oriented. Well-appearing. Skin:  Warm, dry. No rashes. Head:  Normocephalic. Atraumatic. Eyes:  EOMI. Conjunctiva normal.  ENT:  Oral mucosa moist.  Airway patent. Neck:  Supple. Normal ROM. Respiratory:  No respiratory distress. No labored breathing. Lungs clear without rales, rhonchi or wheezing. Cardiovascular:  Regular rate. No Murmur. No peripheral edema. Extremities warm and good color. Chest: Right anterior chest wall and clavicle are nontender to palpation. Extremities:    Right shoulder with tenderness to the anterior aspect and around the [facility] joint. Right upper arm tenderness all along the biceps heads and triceps. Patient is able to flex a muscle with the biceps and I do not appreciate a bicep head to be more superior or inferior. She is not really able to reproduce a muscle along the triceps, but palpation is tender. Right shoulder joint itself is not swollen. Right elbow is not swollen and nontender to palpation. 2+ right radial pulse. Good  strength on the right. Back:  Normal ROM. Nontender to palpation. Neuro:  Alert and Oriented to person, place, time and situation. Normal LOC. Moves all extremities. Speech fluent. Psych:  Calm and Cooperative. Normal thought process. Normal judgement.         Bety Felix / Leilani Bull Results   (All laboratory and radiology results have been personally reviewed by myself)  Labs:  No results found for this visit on 07/01/22. Imaging: All Radiology results interpreted by Radiologist unless otherwise noted. XR SHOULDER RIGHT (MIN 2 VIEWS)   Final Result   No acute abnormality. Mild AC joint arthrosis. ED Course / Medical Decision Making   Medications - No data to display     Re-examination:  7/1/22       Time:   Patients condition . Consult(s):   None    Procedure(s):   none    MDM:   Results of x-ray discussed with patient at bedside. We also discussed possible tendon injury with Pop sensation and pain along the muscles to the upper arm. Recommended RICE. I will also write for shoulder stretching exercises. She wants to take her Tylenol at home. Orthopedic referral will be on discharge instructions. Plan of Care/Counseling:  Physician Assistant on duty reviewed today's visit with the patient in addition to providing specific details for the plan of care and counseling regarding the diagnosis and prognosis. Questions are answered at this time and are agreeable with the plan. ASSESSMENT     1. Arthralgia of right shoulder region    2. Biceps tendinitis of right upper extremity      PLAN   Discharged home. Patient condition is good    New Medications     New Prescriptions    No medications on file     Electronically signed by NICKI Pollack   DD: 7/1/22  **This report was transcribed using voice recognition software. Every effort was made to ensure accuracy; however, inadvertent computerized transcription errors may be present.   END OF ED PROVIDER NOTE       Tamie Pollack  07/01/22 0131

## 2022-07-05 ENCOUNTER — TELEPHONE (OUTPATIENT)
Dept: ORTHOPEDIC SURGERY | Age: 62
End: 2022-07-05

## 2022-07-05 DIAGNOSIS — M25.511 RIGHT SHOULDER PAIN, UNSPECIFIED CHRONICITY: Primary | ICD-10-CM

## 2022-07-05 NOTE — TELEPHONE ENCOUNTER
XR SHOULDER RIGHT (MIN 2 VIEWS)   Final Result   No acute abnormality.       Mild AC joint arthrosis. Routed to Providers for consideration and guidance.

## 2022-07-05 NOTE — TELEPHONE ENCOUNTER
Pt called for an [facility] ED f/u from 7/1 with Dr Tristin Nguyen for rt shoulder pain     FINDINGS:   Glenohumeral joint is normally aligned.  No evidence of acute fracture or   dislocation.  No abnormal periarticular calcifications.  There is mild AC   joint hypertrophy.       Visualized lung is unremarkable     Please call pt with appt info [phone]

## 2022-07-27 ENCOUNTER — OFFICE VISIT (OUTPATIENT)
Dept: ORTHOPEDIC SURGERY | Age: 62
End: 2022-07-27
Payer: COMMERCIAL

## 2022-07-27 VITALS — HEIGHT: 62 IN | BODY MASS INDEX: 20.61 KG/M2 | WEIGHT: 112 LBS

## 2022-07-27 DIAGNOSIS — M25.511 RIGHT SHOULDER PAIN, UNSPECIFIED CHRONICITY: Primary | ICD-10-CM

## 2022-07-27 PROCEDURE — 99204 OFFICE O/P NEW MOD 45 MIN: CPT | Performed by: NURSE PRACTITIONER

## 2022-07-27 NOTE — PROGRESS NOTES
Arthritis Mother     Heart Disease Father     Stroke Father     High Blood Pressure Father     High Cholesterol Father     Cancer Paternal Aunt         breast    Breast Cancer Paternal Aunt     Cancer Paternal Uncle         prostate    Stroke Paternal Grandfather     Cancer Paternal Aunt         breast    Cancer Paternal Cousin         Breast cancer don't know age per pt. Breast Cancer Paternal Cousin        SOCIAL HISTORY  Social History     Occupational History    Not on file   Tobacco Use    Smoking status: Never    Smokeless tobacco: Never   Vaping Use    Vaping Use: Never used   Substance and Sexual Activity    Alcohol use:  Yes     Alcohol/week: 5.0 standard drinks     Types: 5 Glasses of wine per week     Comment: 3/1/17:  enjoys going to [employer] on weekends with     Drug use: No    Sexual activity: Not Currently     Partners: Male     Comment: 3/1/7:  no secondary to impotence         [id number]  CURRENT MEDICATIONS     Current Outpatient Medications:     metoprolol tartrate (LOPRESSOR) 25 MG tablet, Take 1 tablet by mouth 2 times daily, Disp: 180 tablet, Rfl: 3    estradiol (ESTRACE) 0.5 MG tablet, Take 1 tablet by mouth daily, Disp: 90 tablet, Rfl: 3    levothyroxine (SYNTHROID) 50 MCG tablet, Take 1 tablet by mouth daily, Disp: 90 tablet, Rfl: 3    senna (SENOKOT) 8.6 MG tablet, Take 2 tablets by mouth daily Indications: Constipation, Disp: 60 tablet, Rfl: 11    Multiple Vitamins-Minerals (THERAPEUTIC MULTIVITAMIN-MINERALS) tablet, Take 1 tablet by mouth daily, Disp: , Rfl:     Ascorbic Acid (VITAMIN C) 500 MG CAPS, Take 2 tablets by mouth daily , Disp: , Rfl:     calcium citrate-vitamin D (CITRICAL + D) 315-250 MG-UNIT TABS per tablet, Take 1 tablet by mouth 2 times daily (with meals), Disp: , Rfl:     ALLERGIES  Allergies   Allergen Reactions    Diclofenac Sodium Rash    Sulfa Antibiotics Rash       Controlled Substances Monitoring:

## 2022-07-27 NOTE — PROGRESS NOTES
New Shoulder Patient Visit     Referring Provider:   Dorie Vigil, [address]. [place],  [address] [facility]    CHIEF COMPLAINT:   Chief Complaint   Patient presents with    New Patient     R Shoulder pain    ED Follow-up     ED 7/1/22. . R Shoulder pain onset 6 weeks ago. Sivan Philip She went to tear a paper towel off of the roll and she felt a sharp pop in her right shoulder and right upper arm. Now she is having pain all along the biceps and triceps of the right upper arm. Every time she goes to use the right upper arm, lift something she has pain up in the muscles to the upper aspect of her right arm. HPI:      Karla Fisher is a 57y.o. year old female who is seen today  for evaluation of right shoulder pain. Patient reports onset of symptoms July 1. Patient states that she went to rip off a piece of paper towel when she felt a pop in her shoulder. She waited 2 days before presenting to the emergency department where x-rays were taken showing no acute fracture. Patient states that several days later she started noticing bruising to her right upper arm. She reports pain with range of motion and daily activities. Denies mechanical symptoms. States she is noticed some swelling to her biceps since feeling the pop. Denies feelings of instability. Denies previous treatments. She is right-handed. 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       PAST MEDICAL HISTORY  Past Medical History:   Diagnosis Date    Acquired Hypothyroidism     Kidney cysts     Liver cyst     Osteoarthritis     Postmenopausal     Shingles 2012    Thyroid disease     Trigeminal neuralgia 12/8/2019       PAST SURGICAL HISTORY  Past Surgical History:   Procedure Laterality Date    COLONOSCOPY  2008    HYSTERECTOMY, TOTAL ABDOMINAL (CERVIX REMOVED)      JOINT REPLACEMENT Bilateral     KNEE ARTHROSCOPY Right 2017    CO COLONOSCOPY W/BIOPSY SINGLE/MULTIPLE N/A 8/6/2018    COLONOSCOPY WITH BIOPSY performed by Nori Pastrana MD at [address] [facility] HISTORY   Family History   Problem Relation Age of Onset    Cancer Mother         bone, esophagus    Arthritis Mother     Heart Disease Father     Stroke Father     High Blood Pressure Father     High Cholesterol Father     Cancer Paternal Aunt         breast    Breast Cancer Paternal Aunt     Cancer Paternal Uncle         prostate    Stroke Paternal Grandfather     Cancer Paternal Aunt         breast    Cancer Paternal Cousin         Breast cancer don't know age per pt. Breast Cancer Paternal Cousin        SOCIAL HISTORY  Social History     Occupational History    Not on file   Tobacco Use    Smoking status: Never    Smokeless tobacco: Never   Vaping Use    Vaping Use: Never used   Substance and Sexual Activity    Alcohol use:  Yes     Alcohol/week: 5.0 standard drinks     Types: 5 Glasses of wine per week     Comment: 3/1/17:  enjoys going to [employer] on weekends with     Drug use: No    Sexual activity: Not Currently     Partners: Male     Comment: 3/1/7:  no secondary to impotence       CURRENT MEDICATIONS     Current Outpatient Medications:     metoprolol tartrate (LOPRESSOR) 25 MG tablet, Take 1 tablet by mouth 2 times daily, Disp: 180 tablet, Rfl: 3    estradiol (ESTRACE) 0.5 MG tablet, Take 1 tablet by mouth daily, Disp: 90 tablet, Rfl: 3    levothyroxine (SYNTHROID) 50 MCG tablet, Take 1 tablet by mouth daily, Disp: 90 tablet, Rfl: 3    senna (SENOKOT) 8.6 MG tablet, Take 2 tablets by mouth daily Indications: Constipation, Disp: 60 tablet, Rfl: 11    Multiple Vitamins-Minerals (THERAPEUTIC MULTIVITAMIN-MINERALS) tablet, Take 1 tablet by mouth daily, Disp: , Rfl:     Ascorbic Acid (VITAMIN C) 500 MG CAPS, Take 2 tablets by mouth daily , Disp: , Rfl:     calcium citrate-vitamin D (CITRICAL + D) 315-250 MG-UNIT TABS per tablet, Take 1 tablet by mouth 2 times daily (with meals), Disp: , Rfl:     ALLERGIES  Allergies   Allergen Reactions pain probable long head biceps tendon rupture      PLAN  Today we discussed her right shoulder. Patient reports onset of symptoms at the beginning of the month where she was ripping a paper towel and felt a pop in her shoulder. Recent imaging negative for acute bony abnormality. She reported bruising to the biceps muscle several days following incident. On exam today there is a Dami deformity indicating probable long head biceps tendon rupture. Discussed plan of care with patient today. We will obtain an MRI to confirm biceps tendon rupture and to rule out any underlying internal derangement potentially to her rotator cuff. Patient verbalized understanding. She will follow-up once MRI is completed.       [facility], APRN-CNP  Orthopaedic Surgery   7/27/22  8:42 AM

## 2022-08-03 ENCOUNTER — HOSPITAL ENCOUNTER (OUTPATIENT)
Dept: MRI IMAGING | Age: 62
Discharge: HOME | End: 2022-08-05
Payer: COMMERCIAL

## 2022-08-03 DIAGNOSIS — M25.511 RIGHT SHOULDER PAIN, UNSPECIFIED CHRONICITY: ICD-10-CM

## 2022-08-03 PROCEDURE — 73221 MRI JOINT UPR EXTREM W/O DYE: CPT

## 2022-08-26 ENCOUNTER — OFFICE VISIT (OUTPATIENT)
Dept: ORTHOPEDIC SURGERY | Age: 62
End: 2022-08-26
Payer: COMMERCIAL

## 2022-08-26 ENCOUNTER — TELEPHONE (OUTPATIENT)
Dept: ORTHOPEDIC SURGERY | Age: 62
End: 2022-08-26

## 2022-08-26 VITALS — HEIGHT: 62 IN | BODY MASS INDEX: 20.98 KG/M2 | WEIGHT: 114 LBS

## 2022-08-26 DIAGNOSIS — M25.511 RIGHT SHOULDER PAIN, UNSPECIFIED CHRONICITY: Primary | ICD-10-CM

## 2022-08-26 PROCEDURE — 99214 OFFICE O/P EST MOD 30 MIN: CPT | Performed by: ORTHOPAEDIC SURGERY

## 2022-08-26 RX ORDER — CLOTRIMAZOLE AND BETAMETHASONE DIPROPIONATE 10; .64 MG/G; MG/G
CREAM TOPICAL
COMMUNITY
Start: 2022-08-24

## 2022-08-26 NOTE — PATIENT INSTRUCTIONS
Surgery: Right shoulder arthroscopy, rotator cuff repair vs. Debridement     Call [phone] to schedule surgery.

## 2022-08-26 NOTE — PROGRESS NOTES
Follow Up Visit     Aleah Guajardo returns today for follow up visit regarding her right shoulder. She was seen recently after feeling a pop in the arm and found likely had a long head biceps tendon rupture. She subsequently underwent MRI that she is here to review. She is still having pain in the shoulder. She is having pain at night. She has pain with lifting overhead    Physical Exam:     Height: 5' 2\" (1.575 m), Weight: 114 lb (51.7 kg)    General: Alert and oriented x3, no acute distress  Cardiovascular/pulmonary: No labored breathing, peripheral perfusion intact  Musculoskeletal:    On exam, she displays full range of motion, 180/60/T6. She has mild pain with Pilar test but no weakness. No pain with speeds test.  Mild pain with [place]'s test.  Belly press test intact. Bearhug test intact. Mild tenderness over the anterior shoulder    Controlled Substances Monitoring:      Imaging:  MRI reviewed showing full-thickness tear of the supraspinatus with mild retraction. Assessment: Right shoulder full-thickness rotator cuff tear, traumatic      Plan:   We discussed her shoulder today. She has a full-thickness tear which likely was traumatic as she felt a pop and sharp pain in the shoulder. MRI shows minimal atrophy of the muscle suggesting this is likely a traumatic tear. We discussed that she would benefit from operative management in the form of rotator cuff repair. She is interested in proceeding and will discuss with her family and determine a timeline for when she would like to have surgery. Surgery will involve right shoulder arthroscopy, rotator cuff repair. This can likely be done at the surgery center. She will require medical clearance from Dr. Yue Iglesias as well as cardiac clearance given recent new onset afib.       Dania Torres MD  Orthopaedic Surgery   8/26/22  8:53 AM

## 2022-08-26 NOTE — TELEPHONE ENCOUNTER
Surgery scheduling discussed with patient, they would like to proceed     Surgery: Right shoulder arthroscopy, rotator cuff repair vs. Debridement   OR: 9/27/2022 [facility]    Vendor: [facility]   Block: NOAM  CPT: 72448     Pre/post-operative appointments discussed with the patient. Surgical handout given with education discussion, patient aware PAT will also call to go over education prior to surgery. Office extension provided to patient with any further questions.

## 2022-08-30 ENCOUNTER — HOSPITAL ENCOUNTER (OUTPATIENT)
Age: 62
Discharge: HOME | End: 2022-08-30
Payer: COMMERCIAL

## 2022-08-30 DIAGNOSIS — I48.91 ATRIAL FIBRILLATION WITH RAPID VENTRICULAR RESPONSE (HCC): ICD-10-CM

## 2022-08-30 DIAGNOSIS — E03.9 ACQUIRED HYPOTHYROIDISM: ICD-10-CM

## 2022-08-30 LAB
ALBUMIN SERPL-MCNC: 4.2 G/DL (ref 3.5–5.2)
ALP BLD-CCNC: 80 U/L (ref 35–104)
ALT SERPL-CCNC: 19 U/L (ref 0–32)
ANION GAP SERPL CALCULATED.3IONS-SCNC: 10 MMOL/L (ref 7–16)
AST SERPL-CCNC: 19 U/L (ref 0–31)
BASOPHILS ABSOLUTE: 0.01 E9/L (ref 0–0.2)
BASOPHILS RELATIVE PERCENT: 0.2 % (ref 0–2)
BILIRUB SERPL-MCNC: 0.4 MG/DL (ref 0–1.2)
BUN BLDV-MCNC: 15 MG/DL (ref 6–23)
CALCIUM SERPL-MCNC: 9 MG/DL (ref 8.6–10.2)
CHLORIDE BLD-SCNC: 104 MMOL/L (ref 98–107)
CHOLESTEROL, TOTAL: 202 MG/DL (ref 0–199)
CO2: 24 MMOL/L (ref 22–29)
CREAT SERPL-MCNC: 0.7 MG/DL (ref 0.5–1)
EOSINOPHILS ABSOLUTE: 0.13 E9/L (ref 0.05–0.5)
EOSINOPHILS RELATIVE PERCENT: 2.8 % (ref 0–6)
GFR AFRICAN AMERICAN: >60
GFR NON-AFRICAN AMERICAN: >60 ML/MIN/1.73
GLUCOSE BLD-MCNC: 98 MG/DL (ref 74–99)
HCT VFR BLD CALC: 42.1 % (ref 34–48)
HDLC SERPL-MCNC: 91 MG/DL
HEMOGLOBIN: 13.9 G/DL (ref 11.5–15.5)
IMMATURE GRANULOCYTES #: 0.01 E9/L
IMMATURE GRANULOCYTES %: 0.2 % (ref 0–5)
LDL CHOLESTEROL CALCULATED: 94 MG/DL (ref 0–99)
LYMPHOCYTES ABSOLUTE: 1.22 E9/L (ref 1.5–4)
LYMPHOCYTES RELATIVE PERCENT: 26.4 % (ref 20–42)
MCH RBC QN AUTO: 30.6 PG (ref 26–35)
MCHC RBC AUTO-ENTMCNC: 33 % (ref 32–34.5)
MCV RBC AUTO: 92.7 FL (ref 80–99.9)
MONOCYTES ABSOLUTE: 0.42 E9/L (ref 0.1–0.95)
MONOCYTES RELATIVE PERCENT: 9.1 % (ref 2–12)
NEUTROPHILS ABSOLUTE: 2.83 E9/L (ref 1.8–7.3)
NEUTROPHILS RELATIVE PERCENT: 61.3 % (ref 43–80)
PDW BLD-RTO: 13.7 FL (ref 11.5–15)
PLATELET # BLD: 250 E9/L (ref 130–450)
PMV BLD AUTO: 10.1 FL (ref 7–12)
POTASSIUM SERPL-SCNC: 4.5 MMOL/L (ref 3.5–5)
RBC # BLD: 4.54 E12/L (ref 3.5–5.5)
SODIUM BLD-SCNC: 138 MMOL/L (ref 132–146)
T4 TOTAL: 9.2 MCG/DL (ref 4.5–11.7)
TOTAL PROTEIN: 6.3 G/DL (ref 6.4–8.3)
TRIGL SERPL-MCNC: 83 MG/DL (ref 0–149)
TSH SERPL DL<=0.05 MIU/L-ACNC: 1.68 UIU/ML (ref 0.27–4.2)
VLDLC SERPL CALC-MCNC: 17 MG/DL
WBC # BLD: 4.6 E9/L (ref 4.5–11.5)

## 2022-08-30 PROCEDURE — 80053 COMPREHEN METABOLIC PANEL: CPT

## 2022-08-30 PROCEDURE — 85025 COMPLETE CBC W/AUTO DIFF WBC: CPT

## 2022-08-30 PROCEDURE — 84443 ASSAY THYROID STIM HORMONE: CPT

## 2022-08-30 PROCEDURE — 36415 COLL VENOUS BLD VENIPUNCTURE: CPT

## 2022-08-30 PROCEDURE — 84436 ASSAY OF TOTAL THYROXINE: CPT

## 2022-08-30 PROCEDURE — 80061 LIPID PANEL: CPT

## 2022-09-07 ENCOUNTER — OFFICE VISIT (OUTPATIENT)
Dept: FAMILY MEDICINE CLINIC | Age: 62
End: 2022-09-07
Payer: COMMERCIAL

## 2022-09-07 VITALS
DIASTOLIC BLOOD PRESSURE: 64 MMHG | WEIGHT: 116.5 LBS | TEMPERATURE: 97.2 F | SYSTOLIC BLOOD PRESSURE: 104 MMHG | BODY MASS INDEX: 21.44 KG/M2 | OXYGEN SATURATION: 96 % | RESPIRATION RATE: 16 BRPM | HEART RATE: 64 BPM | HEIGHT: 62 IN

## 2022-09-07 DIAGNOSIS — Z01.818 PRE-OPERATIVE EXAMINATION: Primary | ICD-10-CM

## 2022-09-07 DIAGNOSIS — M75.121 NONTRAUMATIC COMPLETE TEAR OF RIGHT ROTATOR CUFF: ICD-10-CM

## 2022-09-07 PROCEDURE — 99214 OFFICE O/P EST MOD 30 MIN: CPT | Performed by: FAMILY MEDICINE

## 2022-09-07 ASSESSMENT — ENCOUNTER SYMPTOMS
NAUSEA: 0
DIARRHEA: 0
BACK PAIN: 0
CONSTIPATION: 0
COUGH: 0
BLOOD IN STOOL: 0
ABDOMINAL PAIN: 0
VOMITING: 0
SORE THROAT: 0
WHEEZING: 0
SHORTNESS OF BREATH: 0
RHINORRHEA: 0

## 2022-09-07 NOTE — PROGRESS NOTES
Carlos Marina is a 57 y.o. female who presents today for     Chief Complaint   Patient presents with    Pre-op Exam     Right shoulder rotator cuff surgery,Dr Steve Duncan     Discuss Labs       Pre-surgical evaluation:  Patient is here by request of Dr. Papa Goodwin who has upcoming right rotator cuff repair scheduled with patient on 9/27/22. Patient will be undergoing general anaesthesia. She has no complaints or concerns today. She is  generally healthy. Chronic medical problems include hypothyroidism and atrial fibrillation with RVR (not currently on anticoagulation and self terminated her digoxin as well as decreased her Lopressor to 12.5 mg QD currently being managed by Dr. Carlos Luo). These are generally controlled and stable at this time. /64 today with a rate of 64 bpm.  Most recent labs reviewed with patient and  are not remarkable. Patient does not smoke. Patient does not drink alcohol. Patient  does not use drugs. Overall doing well. Patient does not have chest pain, palpitations, shortness of breath, or orthopnea. No known kidney or liver issue to date to the best of patient's knowledge, my knowledge, or of that recorded in patient's current medical record. Patient has had cardiac testing in the past including EKG, stress test, and/or catheterization. If available, these records have been reviewed today.      The 10-year ASCVD risk score (Houston Pena., et al., 2013) is: 2.7%    Values used to calculate the score:      Age: 57 years      Sex: Female      Is Non- : No      Diabetic: No      Tobacco smoker: No      Systolic Blood Pressure: 462 mmHg      Is BP treated: No      HDL Cholesterol: 91 mg/dL      Total Cholesterol: 202 mg/dL     Pre-Operative Risk assessment using 2014 ACC/AHA guidelines     Emergent procedure No  Active Cardiac Condition Yes - patient had recently diagnosed Afib with RVR (decompensated HF, Arrhythmia, MI <3 weeks, severe valve disease)  Risk Level of Procedure Intermediate Risk (intraperitoneal, intrathoracic, HENT, orthopedic, or carotid endarterectomy, etc.)  Revised Cardiac Risk Index Risk factors: None  Measurement of Exercise Tolerance before Surgery >4 No    According to the 2014 ACC/AHA pre-operative risk assessment guidelines Gibran Bullock is a intermediate risk for major cardiac complications (due to current lack of treatment of her Afib a noted above) during a intermediate risk procedure and may continue as planned. Specific medication recommendations are listed below. Medications recommended to continue should be taken with a sip of water even when NPO. Further recommendations from consultants: Cardiology  Note: Patient has taken herself off of her Digoxin due to subjective adverse effect of fatigue as well as decreasing metoprolol tartrate 12.5 mg QD. She does have an appointment with Dr. Sole Mandel prior to her surgery for clearance as well and this will be addressed then per patient. Medication Recommendations: Hold any use of NSAIDs 2-3 days prior to surgery. Defer to cardiology for further medication management recommendations for her Afib. [address]:  Patient's past medical, surgical, social and/or family history reviewed, updated in chart, and are non-contributory (unless otherwise stated). Medications and allergies also reviewed and updated in chart. Review of Systems  Review of Systems   Constitutional:  Negative for chills and fever. HENT:  Negative for congestion, ear pain, rhinorrhea and sore throat. Respiratory:  Negative for cough, shortness of breath and wheezing. Cardiovascular:  Negative for chest pain, palpitations and leg swelling. Gastrointestinal:  Negative for abdominal pain, blood in stool, constipation, diarrhea, nausea and vomiting. Genitourinary:  Negative for dysuria, frequency, hematuria and urgency. Musculoskeletal:  Negative for back pain, myalgias and neck pain. Skin:  Negative for rash. Neurological:  Negative for dizziness, weakness, light-headedness and headaches. Psychiatric/Behavioral:  The patient is not nervous/anxious. Physical Exam:    VS:  /64   Pulse 64   Temp 97.2 °F (36.2 °C) (Infrared)   Resp 16   Ht 5' 2\" (1.575 m)   Wt 116 lb 8 oz (52.8 kg)   LMP 01/01/2005   SpO2 96%   BMI 21.31 kg/m²     LAST WEIGHT:  Wt Readings from Last 3 Encounters:   09/07/22 116 lb 8 oz (52.8 kg)   08/26/22 114 lb (51.7 kg)   08/03/22 113 lb (51.3 kg)       BMI Readings from Last 3 Encounters:   09/07/22 21.31 kg/m²   08/26/22 20.85 kg/m²   08/03/22 20.67 kg/m²       Physical Exam  Constitutional:       General: She is not in acute distress. Appearance: Normal appearance. She is well-developed. She is not diaphoretic. HENT:      Head: Normocephalic and atraumatic. Right Ear: External ear normal.      Left Ear: External ear normal.      Mouth/Throat:      Pharynx: No oropharyngeal exudate. Eyes:      General: No scleral icterus. Right eye: No discharge. Conjunctiva/sclera: Conjunctivae normal.      Pupils: Pupils are equal, round, and reactive to light. Neck:      Thyroid: No thyromegaly. Cardiovascular:      Rate and Rhythm: Normal rate and regular rhythm. Heart sounds: Normal heart sounds. No murmur heard. Pulmonary:      Effort: Pulmonary effort is normal. No respiratory distress. Breath sounds: No stridor. No wheezing or rales. Chest:      Chest wall: No tenderness. Abdominal:      General: Bowel sounds are normal. There is no distension. Palpations: Abdomen is soft. There is no mass. Tenderness: no abdominal tenderness There is no guarding. Musculoskeletal:      Right shoulder: Tenderness present. Decreased range of motion. Cervical back: Normal range of motion and neck supple. Lymphadenopathy:      Cervical: No cervical adenopathy. Skin:     General: Skin is warm and dry. Coloration: Skin is not pale. Findings: No erythema or rash. Neurological:      Mental Status: She is alert and oriented to person, place, and time. Psychiatric:         Attention and Perception: Attention normal.         Mood and Affect: Mood and affect normal.         Speech: Speech normal.         Behavior: Behavior normal.         Thought Content: Thought content normal.         Cognition and Memory: Cognition normal.     Labs:  Lab Results   Component Value Date/Time     08/30/2022 09:46 AM    K 4.5 08/30/2022 09:46 AM    K 4.5 02/20/2022 07:15 AM     08/30/2022 09:46 AM    CO2 24 08/30/2022 09:46 AM    BUN 15 08/30/2022 09:46 AM    CREATININE 0.7 08/30/2022 09:46 AM    PROT 6.3 08/30/2022 09:46 AM    LABALBU 4.2 08/30/2022 09:46 AM    CALCIUM 9.0 08/30/2022 09:46 AM    GFRAA >60 08/30/2022 09:46 AM    LABGLOM >60 08/30/2022 09:46 AM    GLUCOSE 98 08/30/2022 09:46 AM    AST 19 08/30/2022 09:46 AM    ALT 19 08/30/2022 09:46 AM    ALKPHOS 80 08/30/2022 09:46 AM    BILITOT 0.4 08/30/2022 09:46 AM    TSH 1.680 08/30/2022 09:46 AM    CHOL 202 08/30/2022 09:46 AM    TRIG 83 08/30/2022 09:46 AM    HDL 91 08/30/2022 09:46 AM    LDLCALC 94 08/30/2022 09:46 AM        Lab Results   Component Value Date    CHOL 202 (H) 08/30/2022    CHOL 217 (H) 10/05/2021    CHOL 220 (H) 07/14/2021     Lab Results   Component Value Date    TRIG 83 08/30/2022    TRIG 80 10/05/2021    TRIG 85 07/14/2021     Lab Results   Component Value Date    HDL 91 08/30/2022     10/05/2021    HDL 95 07/14/2021     Lab Results   Component Value Date    LDLCALC 94 08/30/2022    LDLCALC 97 10/05/2021    LDLCALC 108 (H) 07/14/2021       No results found for: LABA1C  Lab Results   Component Value Date    LDLCALC 94 08/30/2022    CREATININE 0.7 08/30/2022           Assessment / Plan:      Gauri Kisha was seen today for pre-op exam and discuss labs.     Diagnoses and all orders for this visit:    Pre-operative examination: Pending cardiology recommendations for intended medical management, patient is otherwise healthy and can be cleared for her procedure as planned. Note that this clearance is dependent on subsequent cardiology clearance. Nontraumatic complete tear of right rotator cuff: Primary diagnosis associated with her planned procedure. Patient is still symptomatic in terms of ROM and tenderness. Patient expressed continued to desire for RTC repair over total shoulder replacement. Procedure to proceed as planned pending cardiology clearance. Time spent in pre-visit planning, interview, exam, /education and coordination of care: 35 minutes    Follow Up:  Return for Keep scheduled appointment(s) on 10/17/22. or sooner if necessary. Call or go to ED immediately if symptoms worsen or persist.    Educational materials and/or home exercises printed for patient's review and were included in patient instructions on his/her AfterVisit Summary and given to patient at the end of visit. Counseled regarding above diagnosis,including possible risks and complications,  especially if left uncontrolled. Counseled regarding the possible side effects, risks, benefits and alternatives to treatment; patient and/or guardian verbalizes understanding, agrees, feels comfortable with and wishes to proceed with above treatment plan. Advised patient tocall with any new medication issues, and read all Rx info from pharmacy to assureaware of all possible risks and side effects of medication before taking. Reviewed age and gender appropriate health screening exams and vaccinations. Advisedpatient regarding importance of keeping up with recommended health maintenance andto schedule as soon as possible if overdue, as this is important in assessing forundiagnosed pathology, especially cancer, as well as protecting against potentially harmful/life threatening disease.       Patient and/or guardian verbalizes understandingand agrees with above counseling, assessment and plan.    All questions answered.     Lynne Restrepo MD on 9/7/22

## 2022-09-07 NOTE — Clinical Note
Patient was seen by PCP 9/7/2022 for medical clearance for planned surgical procedure. Medical clearance will be granted if cardiac clearance is granted.

## 2022-09-14 ENCOUNTER — OFFICE VISIT (OUTPATIENT)
Dept: ORTHOPEDIC SURGERY | Age: 62
End: 2022-09-14
Payer: COMMERCIAL

## 2022-09-14 DIAGNOSIS — M25.511 RIGHT SHOULDER PAIN, UNSPECIFIED CHRONICITY: Primary | ICD-10-CM

## 2022-09-14 PROCEDURE — 99213 OFFICE O/P EST LOW 20 MIN: CPT | Performed by: ORTHOPAEDIC SURGERY

## 2022-09-14 NOTE — PROGRESS NOTES
Department of Orthopedic Surgery  Attending Pre-operative History and Physical        DIAGNOSIS:  Right Shoulder Rotator Cuff Tear    INDICATION:  Failed Conservative Management    PROCEDURE:  Right shoulder arthroscopy, rotator cuff repair vs. Debridement     CHIEF COMPLAINT:  Right Shoulder Pain    History Obtained From:  patient    HISTORY OF PRESENT ILLNESS:      The patient is a 57 y.o. female with significant past medical history of right shoulder rotator cuff tear who presents with right shoulder weakness. She is felt conservative treatment. MRI showing full-thickness tear of the supraspinatus with mild retraction. She continues to have pain and weakness with daily activities. For these reasons she was to proceed with surgical management. Surgery will involve a right shoulder arthroscopy rotator cuff repair. The risks, benefits, and alternatives to the procedure were discussed at length with the patient and family members. These risks include but are not limited to infection, neurovascular injury, irreparable rotator cuff repair requiring debridement, failure of repair due to poor tissue quality, need for revision surgery or future surgeries down the road, as well as the risks of general anesthesia. Patient verbalizes understanding of the risks and wishes to proceed. Past Medical History:        Diagnosis Date    Acquired Hypothyroidism     Kidney cysts     Liver cyst     Osteoarthritis     Postmenopausal     Shingles 2012    Thyroid disease        Past Surgical History:        Procedure Laterality Date    COLONOSCOPY  2008    HYSTERECTOMY, TOTAL ABDOMINAL (CERVIX REMOVED)      JOINT REPLACEMENT Bilateral     KNEE ARTHROSCOPY Right 2017    [place] COLONOSCOPY W/BIOPSY SINGLE/MULTIPLE N/A 8/6/2018    COLONOSCOPY WITH BIOPSY performed by Lyndsey Durán MD at [address] [place] [address]       Medications Prior to Admission:   Not in a hospital admission.     Allergies:  Diclofenac sodium and Sulfa antibiotics    History of allergic reaction to anesthesia:  No    Social History:   TOBACCO:   reports that she has never smoked. She has never used smokeless tobacco.  ETOH:   reports current alcohol use of about 5.0 standard drinks per week. DRUGS:   reports no history of drug use. Family History:       Problem Relation Age of Onset    Cancer Mother         bone, esophagus    Arthritis Mother     Heart Disease Father     Stroke Father     High Blood Pressure Father     High Cholesterol Father     Cancer Paternal Aunt         breast    Breast Cancer Paternal Aunt     Cancer Paternal Uncle         prostate    Stroke Paternal Grandfather     Cancer Paternal Aunt         breast    Cancer Paternal Cousin         Breast cancer don't know age per pt. Breast Cancer Paternal Cousin        REVIEW OF SYSTEMS:  CONSTITUTIONAL:  negative  RESPIRATORY:  negative  CARDIOVASCULAR:  negative  MUSCULOSKELETAL:  negative except for  HPI  NEUROLOGICAL:  negative    PHYSICAL EXAM:     VITALS:  LMP 01/01/2005     Gen: AOx3, NAD    Heart:  normal apical pulses, normal S1 and S2 and no edema    Lungs:  No increased work of breathing, good air exchange     Abdomen:  no scars, non-distended and non-tender    Extremities:  No clubbing, cyanosis, or edema    Musculoskeletal: Right shoulder exam range of motion 170/80/T6. Pain with internal rotation. Jobes speeds and Houston's positive for weakness. No swelling or deformity visualized.       DATA:  CBC:   Lab Results   Component Value Date/Time    WBC 4.6 08/30/2022 09:46 AM    RBC 4.54 08/30/2022 09:46 AM    HGB 13.9 08/30/2022 09:46 AM    HCT 42.1 08/30/2022 09:46 AM    MCV 92.7 08/30/2022 09:46 AM    MCH 30.6 08/30/2022 09:46 AM    MCHC 33.0 08/30/2022 09:46 AM    RDW 13.7 08/30/2022 09:46 AM     08/30/2022 09:46 AM    MPV 10.1 08/30/2022 09:46 AM     BMP:    Lab Results   Component Value Date/Time     08/30/2022 09:46 AM    K 4.5 08/30/2022 09:46 AM    K 4.5 02/20/2022 07:15 AM     08/30/2022 09:46 AM    CO2 24 08/30/2022 09:46 AM    BUN 15 08/30/2022 09:46 AM    LABALBU 4.2 08/30/2022 09:46 AM    CREATININE 0.7 08/30/2022 09:46 AM    CALCIUM 9.0 08/30/2022 09:46 AM    GFRAA >60 08/30/2022 09:46 AM    LABGLOM >60 08/30/2022 09:46 AM    GLUCOSE 98 08/30/2022 09:46 AM       Radiology Review: MRI reviewed showing full-thickness tear of the supraspinatus with mild retraction, rupture of long head biceps tendon    ASSESSMENT AND PLAN:    1. Patient is a 57 y.o. female with above specified procedure planned right shoulder arthroscopy rotator cuff repair versus debridement with anesthesia    2. Procedure options, risks and benefits reviewed with patient. Patient expresses understanding and has signed consent form to proceed. 3.  Patient and family were provided with pre-op and post-op instructions, prescription medications, and any other supplied needed post op (slings, braces, etc.)      Controlled substances monitoring: possible medication side effects, risk of tolerance and/or dependence, and alternative treatments discussed, no signs of potential drug abuse or diversion identified, and OARRS report reviewed today- activity consistent with treatment plan. ROSI Jordan-CNP  Orthopedic Surgery   09/14/22  1:40 PM    Staff Addendum    I have seen and evaluated the patient and agree with the assessment and plan as documented by Geovanni Harris CNP. I have performed the key components of the history and physical examination and concur with the findings and plan, and have made changes where appropriate/necessary.           Edith Gabriel MD  [address] [place] [address]

## 2022-09-15 ENCOUNTER — OFFICE VISIT (OUTPATIENT)
Dept: CARDIOLOGY CLINIC | Age: 62
End: 2022-09-15
Payer: COMMERCIAL

## 2022-09-15 VITALS
HEIGHT: 62 IN | SYSTOLIC BLOOD PRESSURE: 108 MMHG | HEART RATE: 68 BPM | WEIGHT: 116.9 LBS | BODY MASS INDEX: 21.51 KG/M2 | RESPIRATION RATE: 16 BRPM | DIASTOLIC BLOOD PRESSURE: 70 MMHG

## 2022-09-15 DIAGNOSIS — I48.91 NEW ONSET ATRIAL FIBRILLATION (HCC): Primary | ICD-10-CM

## 2022-09-15 DIAGNOSIS — Z76.0 ENCOUNTER FOR MEDICATION REFILL: ICD-10-CM

## 2022-09-15 DIAGNOSIS — I48.91 ATRIAL FIBRILLATION WITH RAPID VENTRICULAR RESPONSE (HCC): ICD-10-CM

## 2022-09-15 PROCEDURE — 93000 ELECTROCARDIOGRAM COMPLETE: CPT | Performed by: INTERNAL MEDICINE

## 2022-09-15 PROCEDURE — 99214 OFFICE O/P EST MOD 30 MIN: CPT | Performed by: CLINICAL NURSE SPECIALIST

## 2022-09-15 NOTE — PROGRESS NOTES
OUTPATIENT CARDIOLOGY FOLLOW-UP    Name: Mini Angeles    Age: 57 y.o. Primary Care Physician: Adrián Flores MD    Date of Service: 9/15/2022    Chief Complaint: Preoperative cardiac risk stratification. Paroxysmal atrial fibrillation. Interim History:  Ms. Sanjeev Deleon is a 57year old lady who is known to Dr. Shayna Mccallum service. She is being seen today for preoperative cardiac risk stratification prior to arthroscopic right shoulder rotator cuff repair, which is scheduled for September 27, 2022. She denies recent chest pain, SOB, palpitations, lightheadedness, dizziness, syncope, PND, or orthopnea. She stopped taking digoxin in April because of concern of potential adverse effects and toxicity. She monitors her rhythm on Hypereight val and doesn't believe she's had any recurrent AF. Her systolic blood pressure averages in the 90s to low 100s. She works at the [employer] center but plans to retire soon. Review of Systems:   Cardiac: As per HPI  General: No fever, chills, unusual fatigue or weight changes. Pulmonary: As per HPI  HEENT: No visual disturbances, difficult swallowing, epistaxis, or bleeding gumes. GI: No nausea, vomiting, or dark/bloody stools   : No dysuria, hematuria  Endocrine: No temperature intolerance or excessive thirst   Musculoskeletal: Right shoulder discomfort. No other myalgias/arthralgias.    Skin: No rash or ulcerations   Neuro: No dizziness or syncope   Psych: Currently with no depression, anxiety    Past Medical History:  Past Medical History:   Diagnosis Date    Acquired Hypothyroidism     Kidney cysts     Liver cyst     Osteoarthritis     Postmenopausal     Shingles 2012    Thyroid disease        Past Surgical History:  Past Surgical History:   Procedure Laterality Date    COLONOSCOPY  2008    HYSTERECTOMY, TOTAL ABDOMINAL (CERVIX REMOVED)      JOINT REPLACEMENT Bilateral     KNEE ARTHROSCOPY Right 2017    [place] COLONOSCOPY W/BIOPSY SINGLE/MULTIPLE N/A 8/6/2018 COLONOSCOPY WITH BIOPSY performed by Dottie Carrasco MD at [facility] ENDOSCOPY       Family History:  Family History   Problem Relation Age of Onset    Cancer Mother         bone, esophagus    Arthritis Mother     Heart Disease Father     Stroke Father     High Blood Pressure Father     High Cholesterol Father     Cancer Paternal Aunt         breast    Breast Cancer Paternal Aunt     Cancer Paternal Uncle         prostate    Stroke Paternal Grandfather     Cancer Paternal Aunt         breast    Cancer Paternal Cousin         Breast cancer don't know age per pt. Breast Cancer Paternal Cousin        Social History:  Social History     Socioeconomic History    Marital status:      Spouse name: Not on file    Number of children: Not on file    Years of education: Not on file    Highest education level: Not on file   Occupational History    Not on file   Tobacco Use    Smoking status: Never    Smokeless tobacco: Never   Vaping Use    Vaping Use: Never used   Substance and Sexual Activity    Alcohol use: Yes     Alcohol/week: 5.0 standard drinks     Types: 5 Glasses of wine per week     Comment: 3/1/17:  enjoys going to wineries on weekends with     Drug use: No    Sexual activity: Not Currently     Partners: Male     Comment: 3/1/7:  no secondary to impotence   Other Topics Concern    Not on file   Social History Narrative    Not on file     Social Determinants of Health     Financial Resource Strain: Low Risk     Difficulty of Paying Living Expenses: Not hard at all   Food Insecurity: No Food Insecurity    Worried About Running Out of Food in the Last Year: Never true    Ran Out of Food in the Last Year: Never true   Transportation Needs: Not on file   Physical Activity: Not on file   Stress: Not on file   Social Connections: Not on file   Intimate Partner Violence: Not on file   Housing Stability: Not on file       Allergies:   Allergies   Allergen Reactions    Diclofenac Sodium Rash    Sulfa Antibiotics Rash       Current Medications:  Current Outpatient Medications   Medication Sig Dispense Refill    metoprolol tartrate (LOPRESSOR) 25 MG tablet Take 0.5 tablets by mouth daily 45 tablet 3    clotrimazole-betamethasone (LOTRISONE) 1-0.05 % cream       estradiol (ESTRACE) 0.5 MG tablet Take 1 tablet by mouth daily 90 tablet 3    levothyroxine (SYNTHROID) 50 MCG tablet Take 1 tablet by mouth daily 90 tablet 3    senna (SENOKOT) 8.6 MG tablet Take 2 tablets by mouth daily Indications: Constipation 60 tablet 11    Multiple Vitamins-Minerals (THERAPEUTIC MULTIVITAMIN-MINERALS) tablet Take 1 tablet by mouth daily      Ascorbic Acid (VITAMIN C) 500 MG CAPS Take 2 tablets by mouth daily       calcium citrate-vitamin D (CITRICAL + D) 315-250 MG-UNIT TABS per tablet Take 1 tablet by mouth daily (with breakfast)       No current facility-administered medications for this visit. Facility-Administered Medications Ordered in Other Visits   Medication Dose Route Frequency Provider Last Rate Last Admin    iohexol (OMNIPAQUE 240) injection 50 mL  50 mL Oral ONCE PRN Lavonne Nageotte, MD           Physical Exam:  /70   Pulse 68   Resp 16   Ht 5' 2\" (1.575 m)   Wt 116 lb 14.4 oz (53 kg)   LMP 01/01/2005   BMI 21.38 kg/m²   Wt Readings from Last 3 Encounters:   09/15/22 116 lb 14.4 oz (53 kg)   09/07/22 116 lb 8 oz (52.8 kg)   08/26/22 114 lb (51.7 kg)     Appearance: Awake, alert and oriented x 3, no apparent acute distress  Skin: Warm and dry   Head: Normocephalic, atraumatic  Eyes: EOMI, no conjunctival erythema  ENMT: No pharyngeal erythema, MMM, no rhinorrhea  Neck: Supple, no elevated JVP or carotid bruits  Lungs: Clear to auscultation bilaterally. No wheezes, rales, or rhonchi.   Cardiac: Regular rate and rhythm, +S1S2, no murmurs apparent  Abdomen: Soft, nontender, +bowel sounds  Extremities: Moves all extremities x 4, no lower extremity edema  Neurologic: No focal motor deficits apparent, normal mood and affect, alert and oriented x 3  Peripheral Pulses: Intact posterior tibial pulses bilaterally    Cardiac Tests:  EKG today showed Sinus  Rhythm, short KS syndrome, Madhu = 116, low voltage -possible pulmonary disease. Echocardiogram 2/20/2022:   Normal left ventricle size and systolic function. Ejection fraction is visually estimated at 55-60%. No regional wall motion abnormalities seen. Normal left ventricle wall thickness. Indeterminate diastolic function. Normal right ventricular size and function. TAPSE 23 mm. Mild mitral regurgitation is present. No hemodynamically significant aortic stenosis is present. Unable to estimate PA systolic pressure. No evidence for hemodynamically significant pericardial effusion. The 10-year ASCVD risk score (Houston Moseley, et al., 2013) is: 2.9%    Values used to calculate the score:      Age: 57 years      Sex: Female      Is Non- : No      Diabetic: No      Tobacco smoker: No      Systolic Blood Pressure: 337 mmHg      Is BP treated: No      HDL Cholesterol: 91 mg/dL      Total Cholesterol: 202 mg/dL    Assessment:     Preoperative risk stratification   RCRI class I risk, 0.4% risk of MACE  Paroxysmal atrial fibrillation, spontaneously converted to sinus rhythm and remains in sinus. [id number] Vasc score 0, Has Bled score 0  Hypothyroidism on HRT  History of trigeminal neuralgia  Status post bilateral hip replacements  10 year ASCVD risk score 2.9%     Treatment Plan:     Ms. Vidhya Vega is scheduled to undergo arthroscopic right shoulder surgery  which is considered low risk surgery. She hasactive cardiac issues or high risk clincal predictors (CHF or severe AS on exam, or symptoms consistent with angina/ACS) . Her functional status is > 4METS with no limiting cardiac symptoms. CV risk from the proposed procedure is low, based on the surgical procedure. Pre-op testing or change in medications will not alter the risk.    2.  Therefore she is cleared to undergo the procedure with no further testing for risk stratification. 3.   Continue beta blockade during rigo and post-op period. 4.   Minimize interruption of anticoagulation in the perioperative period  5. Remain off digoxin for now  6. She was encouraged to remain active. 7.   Tentative follow up in three months or as needed. The above assessment and treatment plan was discussed with Dr. Erika Mcgrath.      Marcel Lopez, [facility]) Cardiology

## 2022-09-23 DIAGNOSIS — Z98.890 STATUS POST ARTHROSCOPY OF RIGHT SHOULDER: Primary | ICD-10-CM

## 2022-09-23 RX ORDER — HYDROCODONE BITARTRATE AND ACETAMINOPHEN 5; 325 MG/1; MG/1
1 TABLET ORAL EVERY 6 HOURS PRN
Qty: 28 TABLET | Refills: 0 | Status: SHIPPED | OUTPATIENT
Start: 2022-09-23 | End: 2022-09-30

## 2022-09-23 RX ORDER — KETOROLAC TROMETHAMINE 10 MG/1
10 TABLET, FILM COATED ORAL EVERY 6 HOURS
Qty: 8 TABLET | Refills: 0 | Status: SHIPPED
Start: 2022-09-23 | End: 2022-10-27

## 2022-09-28 ENCOUNTER — OFFICE VISIT (OUTPATIENT)
Dept: ORTHOPEDIC SURGERY | Age: 62
End: 2022-09-28

## 2022-09-28 DIAGNOSIS — Z98.890 STATUS POST ARTHROSCOPY OF RIGHT SHOULDER: Primary | ICD-10-CM

## 2022-09-28 PROCEDURE — 99024 POSTOP FOLLOW-UP VISIT: CPT | Performed by: ORTHOPAEDIC SURGERY

## 2022-09-28 NOTE — PROGRESS NOTES
Follow Up Post Operative Visit     Surgery: Right shoulder arthroscopy rotator cuff repair, extensive debridement  Date: 9/27/2022  Location: [address] [facility]     Subjective:    Lavelle Peace is here for follow up visit s/p above procedure. She is doing well. She reports no overnight issues. Reports pain well controlled at this time. Controlled Substances Monitoring:        Physical Exam:    No data recorded    General: Alert and oriented x3, no acute distress  Cardiovascular/pulmonary: No labored breathing, peripheral perfusion intact  Musculoskeletal:    Right shoulder exam neurovascular sensation grossly intact, incision sites closed edges well approximated minimal drainage present. Stable postoperative swelling. No pain in the hand wrist or elbow. Radial pulse +3. Imaging: X-ray including 2 views right shoulder show stable postoperative changes    Impression: Normal right shoulder x-ray with stable postoperative changes    Assessment and Plan: Status post right shoulder arthroscopy rotator cuff repair, extensive debridement    She is 1 day after procedure listed above doing well. Intraoperative pictures and postoperative imaging reviewed with patient and family today. Provided with the postoperative shoulder protocol. She remained in sling as directed. Can remove sling for passive range of motion exercises and showering for basic hygiene purposes. Will not submerge incision site still mature scars are present. She will follow-up in 6 weeks. Anticipate discontinuation of shoulder immobilizer and beginning therapy at that time. [facility]  Orthopedic Surgery   09/28/22  10:21 AM    Staff Addendum    I have seen and evaluated the patient and agree with the assessment and plan as documented by Zulma Turner CNP.  I have performed the key components of the history and physical examination and concur with the findings and plan, and have made changes where appropriate/necessary.           Azeem Henry MD  [address]

## 2022-10-27 ENCOUNTER — TELEMEDICINE (OUTPATIENT)
Dept: FAMILY MEDICINE CLINIC | Age: 62
End: 2022-10-27
Payer: COMMERCIAL

## 2022-10-27 DIAGNOSIS — U07.1 COVID-19 VIRUS INFECTION: Primary | ICD-10-CM

## 2022-10-27 PROCEDURE — 99213 OFFICE O/P EST LOW 20 MIN: CPT | Performed by: FAMILY MEDICINE

## 2022-10-27 ASSESSMENT — ENCOUNTER SYMPTOMS
SHORTNESS OF BREATH: 0
CHEST TIGHTNESS: 0
COUGH: 1
WHEEZING: 0
SORE THROAT: 1
RHINORRHEA: 1

## 2022-10-27 NOTE — PROGRESS NOTES
[address] [facility], [address]   [address] [facility]   Dylan Santizo MD     Patient: Elena Zuniga Birth: 1960  Visit Date: 10/27/22    Claudia Al is a 57y.o. year old female here today for   Chief Complaint   Patient presents with    Positive For Covid-19       HPI  Positive For Covid-19  This is a new problem. The current episode started yesterday. The problem occurs intermittently. Associated symptoms include chills, coughing (dry), a fever (Tmax 100.3) and a sore throat. Pertinent negatives include no congestion. She has tried NSAIDs for the symptoms. +sick contacts with COVID          Review of Systems   Constitutional:  Positive for chills and fever (Tmax 100.3). HENT:  Positive for rhinorrhea (clear) and sore throat. Negative for congestion and postnasal drip. Respiratory:  Positive for cough (dry). Negative for chest tightness, shortness of breath and wheezing. Past medical, surgical, social and/or family historyreviewed, updated as needed, and are non-contributory (unless otherwise stated). Medications, allergies, and problem list also reviewed and updated as needed in patient's record. Current Outpatient Medications   Medication Sig Dispense Refill    nirmatrelvir/ritonavir (PAXLOVID) 20 x 150 MG & 10 x 100MG TBPK Take 3 tablets (two 150 mg nirmatrelvir and one 100 mg ritonavir tablets) by mouth every 12 hours for 5 days.  30 tablet 0    metoprolol tartrate (LOPRESSOR) 25 MG tablet Take 0.5 tablets by mouth daily 45 tablet 3    clotrimazole-betamethasone (LOTRISONE) 1-0.05 % cream       estradiol (ESTRACE) 0.5 MG tablet Take 1 tablet by mouth daily 90 tablet 3    levothyroxine (SYNTHROID) 50 MCG tablet Take 1 tablet by mouth daily 90 tablet 3    Multiple Vitamins-Minerals (THERAPEUTIC MULTIVITAMIN-MINERALS) tablet Take 1 tablet by mouth daily      Ascorbic Acid (VITAMIN C) 500 MG CAPS Take 2 tablets by mouth daily calcium citrate-vitamin D (CITRICAL + D) 315-250 MG-UNIT TABS per tablet Take 1 tablet by mouth daily (with breakfast)       No current facility-administered medications for this visit. Facility-Administered Medications Ordered in Other Visits   Medication Dose Route Frequency Provider Last Rate Last Admin    iohexol (OMNIPAQUE 240) injection 50 mL  50 mL Oral ONCE PRN Disha Crystal MD           Wt Readings from Last 3 Encounters:   09/15/22 116 lb 14.4 oz (53 kg)   09/07/22 116 lb 8 oz (52.8 kg)   08/26/22 114 lb (51.7 kg)                   There were no vitals filed for this visit. Physical Exam  Constitutional:       Appearance: Normal appearance. Eyes:      Conjunctiva/sclera: Conjunctivae normal.   Pulmonary:      Effort: Pulmonary effort is normal. No respiratory distress. Neurological:      Mental Status: She is alert. Psychiatric:         Mood and Affect: Mood normal.         ASSESSMENT/PLAN  Diann Gale was seen today for positive for covid-19. Diagnoses and all orders for this visit:    COVID-19 virus infection  -     nirmatrelvir/ritonavir (PAXLOVID) 20 x 150 MG & 10 x 100MG TBPK; Take 3 tablets (two 150 mg nirmatrelvir and one 100 mg ritonavir tablets) by mouth every 12 hours for 5 days. /MyChart follow up if tests abnormal.    Return for scheduled appointment. or sooner if necessary. TeleMedicine Video Visit    Melisa Bates, was evaluated through a synchronous (real-time) audio-video encounter. The patient (or guardian if applicable) is aware that this is a billable service. , which includes applicable co-pays. This virtual visit was conducted with the patient's  (and/or legal guardian's) consent. The visit was conducted pursuant to the emergency declaration under the [address] [facility], [address] [place] [address] authority and the [employer] Act.   Patient identification was verified, and a caregiver was present when appropriate. The patient was located in a state where the provider was licensed to provide care. Patient identification was verified at the start of the visit, including the patient's telephone number and physical location. I discussed with the patient the nature of our telehealth visits, that:     Due to the nature of an audio- video modality, the only components of a physical exam that could be done are the elements supported by direct observation. I would evaluate the patient and recommend diagnostics and treatments based on my assessment. If it was felt that the patient should be evaluated in clinic or an emergency room setting, then they would be directed there. Our sessions are not being recorded and that personal health information is protected. Our team would provide follow up care in person if/when the patient needs it. Patient's location: home address in [address] [place] [address]   Physician  location other address in [facility] other people involved in call  N/A      On this date 10/27/2022 I have spent 15 minutes reviewing previous notes, test results and face to face (virtual) with the patient discussing the diagnosis and importance of compliance with the treatment plan as well as documenting on the day of the visit. ,     This visit was completed virtually using Doxy. me            I have reviewed my findings and recommendations with Diann Gale.      Kristel Driscoll MD, M.D

## 2022-10-28 PROBLEM — Z20.822 SUSPECTED COVID-19 VIRUS INFECTION: Status: RESOLVED | Noted: 2020-04-21 | Resolved: 2022-10-28

## 2022-11-09 ENCOUNTER — OFFICE VISIT (OUTPATIENT)
Dept: ORTHOPEDIC SURGERY | Age: 62
End: 2022-11-09

## 2022-11-09 DIAGNOSIS — M25.511 RIGHT SHOULDER PAIN, UNSPECIFIED CHRONICITY: ICD-10-CM

## 2022-11-09 DIAGNOSIS — Z98.890 STATUS POST ARTHROSCOPY OF RIGHT SHOULDER: Primary | ICD-10-CM

## 2022-11-09 PROCEDURE — 99024 POSTOP FOLLOW-UP VISIT: CPT | Performed by: ORTHOPAEDIC SURGERY

## 2022-11-09 NOTE — PROGRESS NOTES
Follow Up Post Operative Visit     Surgery: Right shoulder arthroscopy rotator cuff repair, extensive debridement  Date: 9/27/2022  Location: [address] [facility]     Subjective:    Tavares Doe is here for follow up visit s/p above procedure. She is doing well. She has been compliant    Controlled Substances Monitoring:        Physical Exam:    No data recorded    General: Alert and oriented x3, no acute distress  Cardiovascular/pulmonary: No labored breathing, peripheral perfusion intact  Musculoskeletal:    Exam of the shoulder shows intact incisions which are healed. She has no swelling. She can elevate actively to 99 can increase her passively to about 120. She has good rotator cuff muscle strength      Imaging: No new images. Previous images reviewed    Assessment and Plan: 6 weeks out from right shoulder rotator cuff repair  Overall she is doing well. We will start her on PT. Discontinue the sling. She was instructed on home stretching exercises. She was given a prescription for [facility] for PT although she recently had her insurance run out and she is looking at [employer]. She can continue to work on stretching and both she and her  were instructed on stretches today.   Follow-up in 6-week    Blessing Rivera MD  Orthopaedic Surgery   11/9/22  10:45 AM

## 2022-12-02 ENCOUNTER — HOSPITAL ENCOUNTER (OUTPATIENT)
Dept: PHYSICAL THERAPY | Age: 62
Setting detail: THERAPIES SERIES
Discharge: HOME | End: 2022-12-02
Payer: COMMERCIAL

## 2022-12-02 PROCEDURE — 97161 PT EVAL LOW COMPLEX 20 MIN: CPT | Performed by: PHYSICAL THERAPIST

## 2022-12-02 ASSESSMENT — PAIN DESCRIPTION - LOCATION: LOCATION: SHOULDER

## 2022-12-02 ASSESSMENT — PAIN - FUNCTIONAL ASSESSMENT: PAIN_FUNCTIONAL_ASSESSMENT: PREVENTS OR INTERFERES WITH ALL ACTIVE AND SOME PASSIVE ACTIVITIES

## 2022-12-02 ASSESSMENT — PAIN DESCRIPTION - DESCRIPTORS: DESCRIPTORS: ACHING;BURNING

## 2022-12-02 ASSESSMENT — PAIN SCALES - GENERAL: PAINLEVEL_OUTOF10: 8

## 2022-12-02 ASSESSMENT — PAIN DESCRIPTION - ORIENTATION: ORIENTATION: RIGHT

## 2022-12-02 ASSESSMENT — PAIN DESCRIPTION - PAIN TYPE: TYPE: ACUTE PAIN;SURGICAL PAIN

## 2022-12-02 NOTE — PROGRESS NOTES
[address] [facility]                Phone: [phone]   Fax: [phone]    Physical Therapy Daily Treatment Note  Date:  2022    Patient Name:  Gregorio Garcia    :  1960  MRN: [id number]    Evaluating therapist:  KANE Evans           (22)  Restrictions/Precautions:    Diagnosis:  s/p RTC repair L shoulder              (22)  Treatment Diagnosis:    Insurance/Certification information:  [id number]  Referring Physician:  Geovanni Beebe of care signed (Y/N):    Visit# / total visits:    Pain level: 8/10   Time In:  Time Out:    Subjective:      Exercises:  Exercise/Equipment Resistance/Repetitions Other comments   pulleys for flex/abd              table st:  flex                     abd                    ER            pendulums     shrugs     scap  ret              supine wand flex                                                                Other Therapeutic Activities:      Home Exercise Program:  provided 22    Manual Treatments:      Modalities:  IFC/MH to L shoulder PRN     Timed Code Treatment Minutes: Total Treatment Minutes:      Treatment/Activity Tolerance:  [] Patient tolerated treatment well [] Patient limited by fatique  [] Patient limited by pain  [] Patient limited by other medical complications  [] Other:     Prognosis: [] Good [] Fair  [] Poor    Patient Requires Follow-up: [] Yes  [] No    Plan:   [] Continue per plan of care [] Alter current plan (see comments)  [] Plan of care initiated [] Hold pending MD visit [] Discharge  Plan for Next Session:      See Weekly Progress Note: []  Yes  []  No  Next due:        Electronically signed by:   Rosie Amaya PT

## 2022-12-02 NOTE — PROGRESS NOTES
Physical Therapy: Initial Evaluation    Patient: Tyesha Adames (17 y.o. female)   Examination Date:   Plan of Care Certification Period: 2022 to        :  1960 ;    Confirmed: Yes MRN: [id number]  CSN: [id number]   Insurance: Payor: Pam Sajan / Plan: [employer] [id number] / Product Type: *No Product type* /   Insurance ID: [id number] - ([employer]) Secondary Insurance (if applicable):    Referring Physician: ROSI Sterling Junior *     PCP: Jung Pavon MD Visits to Date/Visits Approved:     No Show/Cancelled Appts:   /       Medical Diagnosis: Other specified postprocedural states [Z98.890] s/p RTC repair R shouler  Treatment Diagnosis:       PERTINENT MEDICAL HISTORY           Medical History: Chart Reviewed: Yes   Past Medical History:   Diagnosis Date    Acquired Hypothyroidism     Kidney cysts     Liver cyst     Osteoarthritis     Postmenopausal     Shingles 2012    Thyroid disease      Surgical History:   Past Surgical History:   Procedure Laterality Date    COLONOSCOPY      HYSTERECTOMY, TOTAL ABDOMINAL (CERVIX REMOVED)      JOINT REPLACEMENT Bilateral     KNEE ARTHROSCOPY Right 2017    ME COLONOSCOPY W/BIOPSY SINGLE/MULTIPLE N/A 2018    COLONOSCOPY WITH BIOPSY performed by Carl Ortiz MD at [facility] ENDOSCOPY       Medications:   Current Outpatient Medications:     metoprolol tartrate (LOPRESSOR) 25 MG tablet, Take 0.5 tablets by mouth daily, Disp: 45 tablet, Rfl: 3    clotrimazole-betamethasone (LOTRISONE) 1-0.05 % cream, , Disp: , Rfl:     estradiol (ESTRACE) 0.5 MG tablet, Take 1 tablet by mouth daily, Disp: 90 tablet, Rfl: 3    levothyroxine (SYNTHROID) 50 MCG tablet, Take 1 tablet by mouth daily, Disp: 90 tablet, Rfl: 3    Multiple Vitamins-Minerals (THERAPEUTIC MULTIVITAMIN-MINERALS) tablet, Take 1 tablet by mouth daily, Disp: , Rfl:     Ascorbic Acid (VITAMIN C) 500 MG CAPS, Take 2 tablets by mouth daily , Disp: , Rfl:     calcium citrate-vitamin D (CITRICAL + D) 315-250 MG-UNIT TABS per tablet, Take 1 tablet by mouth daily (with breakfast), Disp: , Rfl:   No current facility-administered medications for this encounter. Facility-Administered Medications Ordered in Other Encounters:     iohexol (OMNIPAQUE 240) injection 50 mL, 50 mL, Oral, ONCE PRN, Michela Lara MD  Allergies: Diclofenac sodium and Sulfa antibiotics      SUBJECTIVE EXAMINATION      ,           Subjective History:  Onset Date: 09/28/22  Subjective: pt presents to therapy s/p RTC repair L shoulder done 9/27/22; pt states she injured shoulder pulling on towels, felt a pop; no PMH for R shoulder per pt; MEDS help with pain; currently out of sling; no c/o N/T or clicking in the shoulder/UE; pt is R handed; sleep is hampered due to aching; RTD for follow-up 12/19/22  Additional Pertinent Hx (if applicable):            Learning/Language: Learning  Does the patient/guardian have any barriers to learning?: No barriers  What is the preferred language of the patient/guardian?: English     Pain Screening    Pain Screening  Patient Currently in Pain: Yes  Pain Assessment: 0-10  Pain Level: 8  Pain Type: Acute pain, Surgical pain  Pain Location: Shoulder  Pain Orientation: Right  Pain Descriptors: Aching, Burning  Functional Pain Assessment: Prevents or interferes with all active and some passive activities    OBJECTIVE EXAMINATION     Regional Screen:    Cervical Screen: AROM WNL for all ranges with no c/o radiculopathy noted  Elbow/Forearm Screen: AROM/strength grossly WNL for all ranges R UE    Observations:   General Observations  Description: pt presents with slight depression of R shoulder in standing as well as slight scapular winging noted at inf angle    Endurance:  FAIR/FAIR+ for all prolonged activities        Neuro Screen:   Sensation      Sensation  Overall Sensation Status: WNL     Left AROM  Right AROM              AROM RUE (degrees)  R Shoulder Flexion (0-180): 110*  R Shoulder Extension (0-45): 40*  R Shoulder ABduction (0-180): 90*  R Shoulder Int Rotation  (0-70): buttock  R Shoulder Ext Rotation (0-90): back of head     Left PROM  Right PROM                    Left Strength  Right Strength              Strength RUE  R Shoulder Flexion: 3-/5  R Shoulder Extension: 3-/5  R Shoulder ABduction: 3-/5  R Shoulder Internal Rotation: 3-/5  R Shoulder External Rotation: 3-/5       ASSESSMENT     Impression: Assessment: pain across R shoulder with all prolonged activities, 8/10    Body Structures, Functions, Activity Limitations Requiring Skilled Therapeutic Intervention: Decreased ROM, Decreased strength, Decreased endurance    Statement of Medical Necessity: Physical Therapy is both indicated and medically necessary as outlined in the POC to increase the likelihood of meeting the functionally related goals stated below. Patient's Activity Tolerance: Patient tolerated evaluation without incident      Patient's rehabilitation potential/prognosis is considered to be: Good    Factors which may impact rehabilitation potential include:          GOALS   Patient Goal(s):    Goals Completed by 8-10 weeks Goal Status   Decrease pain across R shoulder with all prolonged activities, 0-4/10 New   Restore AROM R shoulder to WNL for all ranges New   Increase R shoulder strength to grossly 4, 4+/5 for all ranges New   Improve endurance for all prolonged activities to GOOD/GOOD+ Vayyar I with [facility] for home management of condition New            TREATMENT PLAN     Pt. actively involved in establishing Plan of Care and Goals: Yes      Treatment may include any combination of the following: ROM, Strengthening, Endurance training, Modalities, Home exercise program     Frequency / Duration:  Patient to be seen pt to be seen 2x/week/8-10 weeks for   weeks      Eval Complexity:    Decision Making: Low Complexity        Therapist Signature:  Ngozi Shaw PT    Date: 99/3/7079     I certify that the above Therapy Services are being furnished while the patient is under my care. I agree with the treatment plan and certify that this therapy is necessary. [facility] Signature:  ___________________________   Date:_______                                                                   ROSI Martinez *        Physician Comments: _______________________________________________    Please sign and return to [facility]. Please fax to the location listed below.  Jay Rudolph for this referral!    [address] [facility] [address]  [address]  Dept: [phone]       POC NOTE

## 2022-12-06 ENCOUNTER — HOSPITAL ENCOUNTER (OUTPATIENT)
Dept: PHYSICAL THERAPY | Age: 62
Setting detail: THERAPIES SERIES
Discharge: HOME | End: 2022-12-06
Payer: COMMERCIAL

## 2022-12-06 PROCEDURE — 97110 THERAPEUTIC EXERCISES: CPT

## 2022-12-06 NOTE — PROGRESS NOTES
[address] [facility]                Phone: [phone]   Fax: [phone]    Physical Therapy Daily Treatment Note  Date:  2022    Patient Name:  Nino Cartagena    :  1960  MRN: [id number]    Evaluating therapist:  KANE Cervantes           (22)  Restrictions/Precautions:    Diagnosis:  s/p RTC repair R shoulder              (22)  Treatment Diagnosis:    Insurance/Certification information:  [employer]  Referring Physician:  Aron Damian of care signed (Y/N):    Visit# / total visits:      Pain level: 5/10     Time In:    0743  Time Out:  1100    s/p 10 weeks R RTC repair  RTP  22    Subjective:  Patient presents for first scheduled treatment session following initial evaluation. She reports pain in L shoulder  5/10 this morning. She reports she is having more pain at night with pain into upper arm and elbow also. Exercises:  Exercise/Equipment Resistance/Repetitions Other comments   pulleys for flex/abd   5 min ea          Shrugs w/ retract 20x     Scap retraction 20x         Supine wand press   2 x10    Supine wand flexion 2 x10           table st:  flex  10x                   abd 10x                   ER 10x           pendulums 20x ea 2#                           Objective:  Ther. exercise as listed per flow sheet above. Treatment completed with  to R shoulder x 15 minutes. Assessment:   Patient performs exercises with good effort and pacing. She reports pain increased to 7/10 following exercises. She reports the [facility] felt good on her shoulder.       Goals:    Decrease pain across R shoulder with all prolonged activities, 0-4/10  Restore AROM R shoulder to WNL for all ranges  Increase R shoulder strength to grossly 4, 4+/5 for all ranges  Improve endurance for all prolonged activities to GOOD/GOOD+  Assure I with [facility] for home management of condition    Home Exercise Program:  provided 22    Manual Treatments: Modalities:   x 15 minutes    Timed Code Treatment Minutes:       Total Treatment Minutes:      Treatment/Activity Tolerance:  [x] Patient tolerated treatment well [] Patient limited by fatique  [] Patient limited by pain  [] Patient limited by other medical complications  [] Other:     Prognosis: [] Good [] Fair  [] Poor    Patient Requires Follow-up: [x] Yes  [] No    Plan:   [x] Continue per plan of care [] Alter current plan (see comments)  [] Plan of care initiated [] Hold pending MD visit [] Discharge    Plan for Next Session:      See Weekly Progress Note: []  Yes  []  No  Next due:             CPT codes 12/6/2022 Units    Low Complexity PT evaluation 84908 [id number]     Moderate Complexity PT evaluation  81178     High Complexity PT evaluation 87085     PT Re-evaluation  81425     Gait training 89619     Manual therapy  43545    Electrical Stimulation 73571    Therapeutic activities  31944    Therapeutic exercises  48423 2   Neuromuscular reeducation  73243                 Electronically signed by:  Eduardo Ramey, PTA [id number]

## 2022-12-08 ENCOUNTER — HOSPITAL ENCOUNTER (OUTPATIENT)
Dept: PHYSICAL THERAPY | Age: 62
Setting detail: THERAPIES SERIES
Discharge: HOME | End: 2022-12-08
Payer: COMMERCIAL

## 2022-12-08 PROCEDURE — 97110 THERAPEUTIC EXERCISES: CPT

## 2022-12-08 NOTE — PROGRESS NOTES
[address] [facility]                Phone: [phone]   Fax: [phone]    Physical Therapy Daily Treatment Note  Date:  2022    Patient Name:  Nany Cummins    :  1960  MRN: [id number]    Evaluating therapist:  KANE Lopez           (22)  Restrictions/Precautions:    Diagnosis:  s/p RTC repair R shoulder              (22)  Treatment Diagnosis:    Insurance/Certification information:  [employer]  Referring Physician:  Nicole Heaton of care signed (Y/N):    Visit# / total visits:    3/18  Pain level: 5/10     Time In:    1258  Time Out:  1406     s/p 10 weeks R RTC repair  RTP  22    Subjective:  Patient presents for second of two scheduled treatment sessions this week. She reports pain in L shoulder  5/10 this afternoon. She reports no increased pain following initial treatment session. Exercises:  Exercise/Equipment Resistance/Repetitions Other comments   Standing UBE 5 min L1         pulleys for flex/abd   5 min ea          Upper trap stretch 3 x20s   add to HEP         Shrugs w/ retract 20x          Supine wand press   2 x10    Supine wand flexion 2 x10         High ext 2 x10  gtb    Mid row 2 x10  gtb           table st:  flex  10x   w/ wedge                   abd 10x   w/ wedge                   ER 10x   w/ wedge           Pendulums 20x ea 2#             Objective:  Ther. exercise as listed per flow sheet above. Treatment completed with MH to R shoulder x 15 minutes. Standing AROM: flexion to 135°  ABD to 120° ER reach to T1-2 IR reach to L4-5   Strength R shoulder/RTC: grossly 3/5 thru available ROM    Assessment:   Patient performs exercises with good effort and pacing. AROM and strength are mildly improved from initial evaluation values.     Scapulohumeral rhythm shows mild substitution pattern    Goals:    Decrease pain across R shoulder with all prolonged activities, 0-4/10  Restore AROM R shoulder to WNL for all ranges  Increase R shoulder strength to grossly 4, 4+/5 for all ranges  Improve endurance for all prolonged activities to GOOD/GOOD+  Assure I with [facility] for home management of condition    Home Exercise Program:  provided 12/2/22  upper trap str added 12/8/22    Manual Treatments:      Modalities:  MH x 15 minutes    Timed Code Treatment Minutes:       Total Treatment Minutes:      Treatment/Activity Tolerance:  [x] Patient tolerated treatment well [] Patient limited by fatigue  [] Patient limited by pain  [] Patient limited by other medical complications  [] Other:     Prognosis: [x] Good [] Fair  [] Poor    Patient Requires Follow-up: [x] Yes  [] No    Plan:   [x] Continue per plan of care [] Alter current plan (see comments)  [] Plan of care initiated [] Hold pending MD visit [] Discharge    Plan for Next Session:      See Weekly Progress Note: []  Yes  []  No  Next due:             CPT codes 12/8/2022 Units    Low Complexity PT evaluation 51332 26267     Moderate Complexity PT evaluation  01571     High Complexity PT evaluation 20327     PT Re-evaluation  84056     Gait training 12359     Manual therapy  22473    Electrical Stimulation 01397    Therapeutic activities  31491    Therapeutic exercises  88379 3   Neuromuscular reeducation  73396                 Electronically signed by:  Eduardo Clark, PTA [id number]

## 2022-12-14 ENCOUNTER — OFFICE VISIT (OUTPATIENT)
Dept: CARDIOLOGY CLINIC | Age: 62
End: 2022-12-14
Payer: COMMERCIAL

## 2022-12-14 ENCOUNTER — HOSPITAL ENCOUNTER (OUTPATIENT)
Dept: PHYSICAL THERAPY | Age: 62
Setting detail: THERAPIES SERIES
Discharge: HOME | End: 2022-12-14
Payer: COMMERCIAL

## 2022-12-14 VITALS
DIASTOLIC BLOOD PRESSURE: 70 MMHG | HEIGHT: 62 IN | WEIGHT: 119.3 LBS | HEART RATE: 65 BPM | SYSTOLIC BLOOD PRESSURE: 110 MMHG | BODY MASS INDEX: 21.95 KG/M2

## 2022-12-14 DIAGNOSIS — I48.91 ATRIAL FIBRILLATION WITH RAPID VENTRICULAR RESPONSE (HCC): ICD-10-CM

## 2022-12-14 DIAGNOSIS — I48.91 NEW ONSET ATRIAL FIBRILLATION (HCC): Primary | ICD-10-CM

## 2022-12-14 DIAGNOSIS — Z76.0 ENCOUNTER FOR MEDICATION REFILL: ICD-10-CM

## 2022-12-14 PROCEDURE — 97530 THERAPEUTIC ACTIVITIES: CPT

## 2022-12-14 PROCEDURE — 97110 THERAPEUTIC EXERCISES: CPT

## 2022-12-14 PROCEDURE — 93000 ELECTROCARDIOGRAM COMPLETE: CPT | Performed by: INTERNAL MEDICINE

## 2022-12-14 PROCEDURE — 99213 OFFICE O/P EST LOW 20 MIN: CPT | Performed by: INTERNAL MEDICINE

## 2022-12-14 NOTE — PATIENT INSTRUCTIONS
14-day Holter monitor results were reviewed. Baseline rhythm is sinus rhythm with a few episodes of SVT in premature atrial ventricular complexes. No A. fib burden. Based on her [id number] Vasc score she is at low risk for thromboembolic events. She was advised to discontinue Eliquis for now and monitor rhythm with a Kardia device. Continue metoprolol tartrate 12.5 mg p.o. twice daily. Follow-up with me in 6 months.

## 2022-12-14 NOTE — PROGRESS NOTES
OUTPATIENT CARDIOLOGY FOLLOW-UP    Name: Nany Cummins    Age: 57 y.o. Primary Care Physician: Branden Baig MD    Date of Service: 12/14/2022    Chief Complaint:   Chief Complaint   Patient presents with    Atrial Fibrillation     3 mo visit       Interim History:   Mrs. Elizabeth Maria is a 77-year-old  female with history of hypothyroidism and HRT, trigeminal neuralgia, bilateral hip replacements, COVID-19 infection in 2020 not hospitalized: Lifetime non-smoker and denies any history of hypertension, CHF, CVA, MIs, diabetes. She presented to the emergency room with intermittent episodes of palpitations since Friday. She was diagnosed with this sinus congestion on 2/17/2022 and was initiated on Augmentin and Medrol Dosepak. She started having severe symptoms and palpitations. She also noticed mild chest discomfort or pressure when she turned to her left side while laying in the bed. She denied chest pain with exertion, dizziness, lightheadedness, orthopnea, PND, or dyspnea with exertion. Her daughter who is a nurse anesthetist checked on her and brought her to the emergency room due to fast heart rate for further evaluation. In the emergency room her blood pressure was 98/73 she was afebrile. Chest x-ray showed no acute findings EKG showed A. fib with RVR. Lab work showed creatinine 1.6 and WBC 8.2 hemoglobin 14.3 proBNP 180. She was initiated on therapeutic Lovenox and was started on Eliquis. She also received 10 mg IV Cardizem followed by Cardizem infusion     Patient was seen as a new consult on 2/19/2022 and was diagnosed with a new onset atrial fibrillation with RVR. She had a ASR2OT7 Vasc score of 0 has bled score 0. She was started on digoxin and metoprolol and she converted to sinus rhythm. She had an echocardiogram which showed normal LV and RV function. No significant valvular heart disease was noted.   She had a 14-day Holter monitor which showed predominant sinus rhythm with a few episodes of SVT otherwise no episodes of A. fib. She is still on Eliquis 5 mg p.o. twice daily and digoxin 62.5 mcg p.o. daily and metoprolol 25 mg p.o. twice daily. She told me that she sometimes feels tired after she was initiated on these medications. She denies any bleeding episodes. She is compliant with medications, as well as salt and fluid intake. She does not take any over-the-counter arthritis medications. She told me that she stopped taking digoxin, she is also taking metoprolol tartrate 25 mg only once a day. She was recommended to take 1/2 tablet twice daily due to soft blood pressures and also due to short half-life of metoprolol. Currently she is not on any anticoagulation therapy due to low [id number] score. She does have a Kardia device at home she monitors her rhythm on a regular basis at least once a day. No new cardiac complaints since last cardiology evaluation. She denies recent chest pain, SOB, palpitations, lightheadedness, dizziness, syncope, PND, or orthopnea. SR on EKG.     Review of Systems:   Cardiac: As per HPI  General: No fever, chills  Pulmonary: As per HPI  HEENT: No visual disturbances, difficult swallowing  GI: No nausea, vomiting  Endocrine: No thyroid disease or DM  Musculoskeletal: QUIÑONEZ x 4, no focal motor deficits  Skin: Intact, no rashes  Neuro/Psych: No headache or seizures    Past Medical History:  Past Medical History:   Diagnosis Date    Acquired Hypothyroidism     Kidney cysts     Liver cyst     Osteoarthritis     Postmenopausal     Shingles 2012    Thyroid disease        Past Surgical History:  Past Surgical History:   Procedure Laterality Date    COLONOSCOPY  2008    HYSTERECTOMY, TOTAL ABDOMINAL (CERVIX REMOVED)      JOINT REPLACEMENT Bilateral     KNEE ARTHROSCOPY Right 2017    CT COLONOSCOPY W/BIOPSY SINGLE/MULTIPLE N/A 8/6/2018    COLONOSCOPY WITH BIOPSY performed by Trav Muñoz MD at [facility] ENDOSCOPY       Family History:  Family History   Problem Relation Age of Onset    Cancer Mother         bone, esophagus    Arthritis Mother     Heart Disease Father     Stroke Father     High Blood Pressure Father     High Cholesterol Father     Cancer Paternal Aunt         breast    Breast Cancer Paternal Aunt     Cancer Paternal Uncle         prostate    Stroke Paternal Grandfather     Cancer Paternal Aunt         breast    Cancer Paternal Cousin         Breast cancer don't know age per pt. Breast Cancer Paternal Cousin        Social History:  Social History     Socioeconomic History    Marital status:      Spouse name: Not on file    Number of children: Not on file    Years of education: Not on file    Highest education level: Not on file   Occupational History    Not on file   Tobacco Use    Smoking status: Never    Smokeless tobacco: Never   Vaping Use    Vaping Use: Never used   Substance and Sexual Activity    Alcohol use: Yes     Alcohol/week: 5.0 standard drinks     Types: 5 Glasses of wine per week     Comment: 3/1/17:  enjoys going to wineries on weekends with     Drug use: No    Sexual activity: Not Currently     Partners: Male     Comment: 3/1/7:  no secondary to impotence   Other Topics Concern    Not on file   Social History Narrative    Not on file     Social Determinants of Health     Financial Resource Strain: Low Risk     Difficulty of Paying Living Expenses: Not hard at all   Food Insecurity: No Food Insecurity    Worried About Running Out of Food in the Last Year: Never true    Ran Out of Food in the Last Year: Never true   Transportation Needs: Not on file   Physical Activity: Not on file   Stress: Not on file   Social Connections: Not on file   Intimate Partner Violence: Not on file   Housing Stability: Not on file       Allergies:   Allergies   Allergen Reactions    Diclofenac Sodium Rash    Sulfa Antibiotics Rash       Current Medications:  Current Outpatient Medications   Medication Sig Dispense Refill metoprolol tartrate (LOPRESSOR) 25 MG tablet Take 0.5 tablets by mouth daily (Patient taking differently: Take 25 mg by mouth daily) 45 tablet 3    estradiol (ESTRACE) 0.5 MG tablet Take 1 tablet by mouth daily 90 tablet 3    levothyroxine (SYNTHROID) 50 MCG tablet Take 1 tablet by mouth daily 90 tablet 3    Multiple Vitamins-Minerals (THERAPEUTIC MULTIVITAMIN-MINERALS) tablet Take 1 tablet by mouth daily      Ascorbic Acid (VITAMIN C) 500 MG CAPS Take 2 tablets by mouth daily       calcium citrate-vitamin D (CITRICAL + D) 315-250 MG-UNIT TABS per tablet Take 1 tablet by mouth daily (with breakfast)       No current facility-administered medications for this visit. Facility-Administered Medications Ordered in Other Visits   Medication Dose Route Frequency Provider Last Rate Last Admin    iohexol (OMNIPAQUE 240) injection 50 mL  50 mL Oral ONCE PRN Carry MD Jose           Physical Exam:  /70   Pulse 65   Ht 5' 2\" (1.575 m)   Wt 119 lb 4.8 oz (54.1 kg)   LMP 01/01/2005   BMI 21.82 kg/m²   Wt Readings from Last 3 Encounters:   12/14/22 119 lb 4.8 oz (54.1 kg)   09/15/22 116 lb 14.4 oz (53 kg)   09/07/22 116 lb 8 oz (52.8 kg)     Appearance: Awake, alert and oriented x 3, no acute respiratory distress  Skin: Intact, no rash  Head: Normocephalic, atraumatic  Eyes: EOMI, no conjunctival erythema  ENMT: No pharyngeal erythema, MMM, no rhinorrhea  Neck: Supple, no elevated JVP, no carotid bruits  Lungs: Clear to auscultation bilaterally. No wheezes, rales, or rhonchi.   Cardiac: Regular rate and rhythm, +S1S2, no murmurs apparent  Abdomen: Soft, nontender, +bowel sounds  Extremities: Moves all extremities x 4, no lower extremity edema  Neurologic: No focal motor deficits apparent, normal mood and affect, alert and oriented x 3  Peripheral Pulses: Intact posterior tibial pulses bilaterally    Laboratory Tests:  Lab Results   Component Value Date    CREATININE 0.7 08/30/2022    BUN 15 08/30/2022     08/30/2022    K 4.5 08/30/2022     08/30/2022    CO2 24 08/30/2022     Lab Results   Component Value Date/Time    MG 2.0 02/20/2022 07:15 AM     Lab Results   Component Value Date    WBC 4.6 08/30/2022    HGB 13.9 08/30/2022    HCT 42.1 08/30/2022    MCV 92.7 08/30/2022     08/30/2022     Lab Results   Component Value Date    ALT 19 08/30/2022    AST 19 08/30/2022    ALKPHOS 80 08/30/2022    BILITOT 0.4 08/30/2022     No results found for: CKTOTAL, CKMB, CKMBINDEX, TROPONINI  Lab Results   Component Value Date    INR 0.9 02/19/2022    PROTIME 9.5 02/19/2022     Lab Results   Component Value Date    TSH 1.680 08/30/2022     No results found for: LABA1C  No results found for: EAG  Lab Results   Component Value Date    CHOL 202 (H) 08/30/2022    CHOL 217 (H) 10/05/2021    CHOL 220 (H) 07/14/2021     Lab Results   Component Value Date    TRIG 83 08/30/2022    TRIG 80 10/05/2021    TRIG 85 07/14/2021     Lab Results   Component Value Date    HDL 91 08/30/2022     10/05/2021    HDL 95 07/14/2021     Lab Results   Component Value Date    LDLCALC 94 08/30/2022    LDLCALC 97 10/05/2021    LDLCALC 108 (H) 07/14/2021     Lab Results   Component Value Date    LABVLDL 17 08/30/2022    LABVLDL 16 10/05/2021    LABVLDL 15 12/19/2017     No results found for: CHOLHDLRATIO    Cardiac Tests:  ECG: Normal sinus rhythm short NC interval low voltage QRS complexes, abnormal EKG      Echocardiogram 2/20/2022:     Normal left ventricle size and systolic function. Ejection fraction is visually estimated at 55-60%. No regional wall motion abnormalities seen. Normal left ventricle wall thickness. Indeterminate diastolic function. Normal right ventricular size and function. TAPSE 23 mm. Mild mitral regurgitation is present. No hemodynamically significant aortic stenosis is present. Unable to estimate PA systolic pressure. No evidence for hemodynamically significant pericardial effusion.   Stress test: Cardiac catheterization:     The 10-year ASCVD risk score (Sadiq LARA, et al., 2019) is: 3%    Values used to calculate the score:      Age: 57 years      Sex: Female      Is Non- : No      Diabetic: No      Tobacco smoker: No      Systolic Blood Pressure: 351 mmHg      Is BP treated: No      HDL Cholesterol: 91 mg/dL      Total Cholesterol: 202 mg/dL        ASSESSMENT:  Paroxysmal atrial fibrillation, spontaneously converted to sinus rhythm and remains in sinus. [id number] Vasc score 0, has bled score 0  Hypothyroidism on HRT  History of trigeminal neuralgia  Status post bilateral hip replacements    Plan:   14-day Holter monitor results were reviewed. Baseline rhythm is sinus rhythm with a few episodes of SVT in premature atrial ventricular complexes. No A. fib burden. Based on her ETR2XC0 Vasc score she is at low risk for thromboembolic events. She was advised no anticoagulation therapy for now now. She needs to monitor rhythm with a Kardia device and call us if she notices any episodes of A. fib. Continue metoprolol tartrate 12.5 mg p.o. twice daily. Follow-up with me in 6 months. The patient's current medication list, allergies, problem list and results of all previously ordered testing were reviewed at today's visit.   Sia Ramírez MD  [facility]) Cardiology

## 2022-12-14 NOTE — PROGRESS NOTES
[address] [facility]                Phone: [phone]   Fax: [phone]    Physical Therapy Daily Treatment Note  Date:  2022    Patient Name:  Sammy Roa    :  1960  MRN: [id number]    Evaluating therapist:  KANE Oseguera           (22)  Restrictions/Precautions:    Diagnosis:  s/p RTC repair R shoulder              (22)  Treatment Diagnosis:    Insurance/Certification information:  [employer]  Referring Physician:  Adrienne Nunez of care signed (Y/N):    Visit# / total visits:      Pain level: 0-4-7/10     Time In:    1048  Time Out:  3797    s/p 11 weeks R RTC repair  RTP  22    Subjective:  Patient presents for first of two scheduled treatment sessions this week. She reports no pain in L shoulder at rest this morning with pain 4-7/10 with use of R arm. She reports pain continues to be more at night also. Exercises:  Exercise/Equipment Resistance/Repetitions Other comments   Standing UBE  f/b 3 min ea   L1         Pulleys flex/abd     5 min ea          Upper trap stretch 3 x20s   add to HEP         Shrugs w/ retract 20x          Supine wand press   2 x10    Supine wand flexion 2 x10         S/L ER 2 x10  w/towel roll         High ext 2 x15  gtb    Mid row 2 x15  btb         Ball on wall flexion/scaption 10x ea            table st:  flex  10x   w/ wedge                   abd 10x   w/ wedge                   ER 10x   w/ wedge           Pendulums 20x ea 2#             Objective:  Ther. exercise as listed per flow sheet above. Treatment completed with MH to R shoulder x 15 minutes. Supine wand flexion to 150°   Standing AROM: flexion to 135°  ABD to 120°     ER reach to T2     IR reach to L4   Strength R shoulder/RTC: grossly 3 to 3+/5 thru available ROM      Assessment:   Patient performs exercises with good effort and pacing. AROM is stable to previous week. Strength show mild improvement in R shoulder.     Pain levels are variable with time of day and activity/positioning. Goals:    Decrease pain across R shoulder with all prolonged activities, 0-4/10  Restore AROM R shoulder to WNL for all ranges  Increase R shoulder strength to grossly 4, 4+/5 for all ranges  Improve endurance for all prolonged activities to GOOD/GOOD+  Assure I with HEP for home management of condition    Home Exercise Program:  provided 12/2/22  upper trap str added 12/8/22    Manual Treatments:      Modalities:  MH x 15 minutes    Timed Code Treatment Minutes:       Total Treatment Minutes:      Treatment/Activity Tolerance:  [x] Patient tolerated treatment well [] Patient limited by fatigue  [] Patient limited by pain  [] Patient limited by other medical complications  [] Other:     Prognosis: [x] Good [] Fair  [] Poor    Patient Requires Follow-up: [x] Yes  [] No    Plan:   [x] Continue per plan of care [] Alter current plan (see comments)  [] Plan of care initiated [] Hold pending MD visit [] Discharge    Plan for Next Session:      See Weekly Progress Note: []  Yes  []  No  Next due:             CPT codes 12/14/2022 Units    Low Complexity PT evaluation 10303 [id number]     Moderate Complexity PT evaluation  91690     High Complexity PT evaluation 65868     PT Re-evaluation  25529     Gait training 85618     Manual therapy  [id number]    Electrical Stimulation 50861    Therapeutic activities  81699 1   Therapeutic exercises  74701 2   Neuromuscular reeducation  89774                 Electronically signed by:  Eduardo Raines, PTA [id number]

## 2022-12-15 ENCOUNTER — APPOINTMENT (OUTPATIENT)
Dept: PHYSICAL THERAPY | Age: 62
End: 2022-12-15
Payer: COMMERCIAL

## 2022-12-15 ENCOUNTER — HOSPITAL ENCOUNTER (OUTPATIENT)
Dept: PHYSICAL THERAPY | Age: 62
Setting detail: THERAPIES SERIES
Discharge: HOME | End: 2022-12-15
Payer: COMMERCIAL

## 2022-12-15 PROCEDURE — 97110 THERAPEUTIC EXERCISES: CPT

## 2022-12-15 PROCEDURE — 97530 THERAPEUTIC ACTIVITIES: CPT

## 2022-12-15 NOTE — PROGRESS NOTES
[address] [facility]                Phone: [phone]   Fax: [phone]    Physical Therapy Daily Treatment Note  Date:  12/15/2022    Patient Name:  Gregorio Garcia    :  1960  MRN: [id number]    Evaluating therapist:  KANE Evans           (22)  Restrictions/Precautions:    Diagnosis:  s/p RTC repair R shoulder              (22)  Treatment Diagnosis:    Insurance/Certification information:  [employer]  Referring Physician:  Geovanni Beebe of care signed (Y/N):    Visit# / total visits:      Pain level: 0-7/10     Time In:    2529  Time Out: 2902    s/p 11 weeks R RTC repair  RTP  22    Subjective:  Patient presents for second of two scheduled treatment sessions this week. She reports no pain in L shoulder at rest this afternoon with pain as much as 7/10 with use of R arm. She reports she used a heating pad on her shoulder before bed last night and she was able to sleep thru the night. Exercises:  Exercise/Equipment Resistance/Repetitions Other comments   Standing UBE  f/b 3 min ea   L1         Pulleys flex/abd     5 min ea          Shrugs w/ retract 20x          Supine wand press   2 x10  1#    Supine wand flexion 2 x10  1#         S/L ER 3 x10  w/ towel roll         High ext 2 x15  gtb    Mid row 2 x15  btb         Ball on wall flexion/scaption 15x ea            table st:  flex  10x   w/ wedge                   abd 10x   w/ wedge                   ER 10x   w/ wedge           Pendulums HEP             Objective:  Ther. exercise as listed per flow sheet above. Treatment completed with  to R shoulder x 15 minutes. Assessment:   Patient performs exercises with good effort and pacing.       Goals:    Decrease pain across R shoulder with all prolonged activities, 0-4/10  Restore AROM R shoulder to WNL for all ranges  Increase R shoulder strength to grossly 4, 4+/5 for all ranges  Improve endurance for all prolonged activities to GOOD/GOOD+  Assure I with HEP for home management of condition    Home Exercise Program:  provided 12/2/22  upper trap str added 12/8/22    Manual Treatments:      Modalities:  MH x 15 minutes    Timed Code Treatment Minutes:       Total Treatment Minutes:      Treatment/Activity Tolerance:  [x] Patient tolerated treatment well [] Patient limited by fatigue  [] Patient limited by pain  [] Patient limited by other medical complications  [] Other:     Prognosis: [x] Good [] Fair  [] Poor    Patient Requires Follow-up: [x] Yes  [] No    Plan:   [x] Continue per plan of care [] Alter current plan (see comments)  [] Plan of care initiated [] Hold pending MD visit [] Discharge    Plan for Next Session:      See Weekly Progress Note: []  Yes  []  No  Next due:             CPT codes 12/15/2022 Units    Low Complexity PT evaluation 90807 32533     Moderate Complexity PT evaluation  12897     High Complexity PT evaluation 31428     PT Re-evaluation  87495     Gait training 00778     Manual therapy  [id number]    Electrical Stimulation 07350    Therapeutic activities  98251 1   Therapeutic exercises  01069 2   Neuromuscular reeducation  61318                 Electronically signed by:  Eduardo Alonso, PTA [id number]

## 2022-12-19 ENCOUNTER — OFFICE VISIT (OUTPATIENT)
Dept: ORTHOPEDIC SURGERY | Age: 62
End: 2022-12-19

## 2022-12-19 DIAGNOSIS — M25.511 RIGHT SHOULDER PAIN, UNSPECIFIED CHRONICITY: ICD-10-CM

## 2022-12-19 DIAGNOSIS — Z98.890 STATUS POST ARTHROSCOPY OF RIGHT SHOULDER: Primary | ICD-10-CM

## 2022-12-19 NOTE — PROGRESS NOTES
Follow Up Post Operative Visit     Surgery: Right shoulder arthroscopy rotator cuff repair, extensive debridement  Date: 9/27/2022  Location: [address] [facility]     Subjective:    Arpita Enriquez is here for follow up visit s/p above procedure. Overall she is doing well. She has nearly regained all motion. She is still working on strengthening. Still having some pain occasionally    Controlled Substances Monitoring:        Physical Exam:    No data recorded    General: Alert and oriented x3, no acute distress  Cardiovascular/pulmonary: No labored breathing, peripheral perfusion intact  Musculoskeletal:    Exam of the shoulder shows range of motion approximately 150/30/L1. There is good strength with Pilar test.  There is no tenderness over the shoulder      Imaging: No new images. Previous images reviewed    Assessment and Plan: She is under 3 months out from right shoulder massive rotator cuff tear repair  She is doing well. She will continue with PT to regain her last bit of motion and our focus on strengthening.   Follow-up in 3 months    Zechariah Ortega MD  Orthopaedic Surgery   12/19/22  9:55 AM

## 2022-12-20 ENCOUNTER — HOSPITAL ENCOUNTER (OUTPATIENT)
Dept: PHYSICAL THERAPY | Age: 62
Setting detail: THERAPIES SERIES
Discharge: HOME | End: 2022-12-20
Payer: COMMERCIAL

## 2022-12-20 PROCEDURE — 97110 THERAPEUTIC EXERCISES: CPT

## 2022-12-20 PROCEDURE — 97530 THERAPEUTIC ACTIVITIES: CPT

## 2022-12-20 NOTE — PROGRESS NOTES
[address] [facility]                Phone: [phone]   Fax: [phone]    Physical Therapy Daily Treatment Note  Date:  2022    Patient Name:  Arpita Enriquez    :  1960  MRN: [id number]    Evaluating therapist:  Deanna Mercedes, KANE   22  Restrictions/Precautions:    Diagnosis:  s/p RTC repair R shoulder      Insurance/Certification information:  [id number]  Referring Physician:  Shea Aschoff of care signed (Y/N):    Visit# / total visits:      Pain level: 6/10     Time In:      1017  Time Out:   1110    s/p 12 weeks R RTC repair  RTP  22    Subjective:  Patient presents for first of two scheduled treatment sessions this week. She reports no pain in L shoulder at rest this monring with pain as much as  6/10 with use of R arm and overnight. Exercises:  Exercise/Equipment Resistance/Repetitions Other comments   Standing UBE  f/b 3/3 min  L2         Pulleys flex/abd     5 min ea          Shrugs w/ retract 20x          Supine wand press   2 x10  1#    Supine wand flexion 2 x10  1#         S/L ER 3 x10 1# w/ towel roll         High ext 2 x15  gtb    Mid row 2 x15  btb         Ball on wall flexion/scaption 15x ea            table st:  flex  10x   w/ wedge                   abd 10x   w/ wedge                   ER 10x   w/ wedge           Pendulums HEP             Objective:  Ther. exercise as listed per flow sheet above. No modalities. Standing AROM: flexion to 140°  ABD to 120°     ER reach to T2-3     IR reach to L1-2   Strength R shoulder/RTC: grossly  3+/5 thru available ROM      Assessment:   Patient performs exercises with good effort and pacing. AROM continues to show steady improvement.          Goals:    Decrease pain across R shoulder with all prolonged activities, 0-4/10  Restore AROM R shoulder to WNL for all ranges  Increase R shoulder strength to grossly 4, 4+/5 for all ranges  Improve endurance for all prolonged activities to GOOD/GOOD+  Assure I with HEP for home management of condition    Home Exercise Program:  provided 12/2/22  upper trap str added 12/8/22    Manual Treatments:      Modalities:  MH x 15 minutes    Timed Code Treatment Minutes:       Total Treatment Minutes:      Treatment/Activity Tolerance:  [x] Patient tolerated treatment well [] Patient limited by fatigue  [] Patient limited by pain  [] Patient limited by other medical complications  [] Other:     Prognosis: [x] Good [] Fair  [] Poor    Patient Requires Follow-up: [x] Yes  [] No    Plan:   [x] Continue per plan of care [] Alter current plan (see comments)  [] Plan of care initiated [] Hold pending MD visit [] Discharge    Plan for Next Session:      See Weekly Progress Note: []  Yes  []  No  Next due:             CPT codes 12/20/2022 Units    Low Complexity PT evaluation 40534 25558     Moderate Complexity PT evaluation  10008     High Complexity PT evaluation 71923     PT Re-evaluation  76452     Gait training 18178     Manual therapy  [id number]    Electrical Stimulation 54515    Therapeutic activities  28803 1   Therapeutic exercises  10903 2   Neuromuscular reeducation  70777                 Electronically signed by:  Eduardo Maynard, PTA [id number]

## 2022-12-22 ENCOUNTER — HOSPITAL ENCOUNTER (OUTPATIENT)
Dept: PHYSICAL THERAPY | Age: 62
Setting detail: THERAPIES SERIES
Discharge: HOME | End: 2022-12-22
Payer: COMMERCIAL

## 2022-12-22 PROCEDURE — 97530 THERAPEUTIC ACTIVITIES: CPT

## 2022-12-22 PROCEDURE — 97110 THERAPEUTIC EXERCISES: CPT

## 2022-12-22 NOTE — PROGRESS NOTES
[address] [facility]                Phone: [phone]   Fax: [phone]    Physical Therapy Daily Treatment Note  Date:  2022    Patient Name:  Kan Bueno    :  1960  MRN: [id number]    Evaluating therapist:  KANE Ruth   22  Restrictions/Precautions:    Diagnosis:  s/p RTC repair R shoulder    72  Insurance/Certification information:  [employer]  Referring Physician:  Marcela Tucker of care signed (Y/N):    Visit# / total visits:      Pain level: 0-3/10     Time In:      1024  Time Out:    0284     s/p 12 weeks R RTC repair  RTP  3/20/23    Subjective:  Patient presents for second of two scheduled treatment sessions this week. She reports no pain in L shoulder at rest this morning. She reports pain at night has been less, 3/10. Exercises:  Exercise/Equipment Resistance/Repetitions Comments   Standing UBE  f/b 3/3 min  L2         Pulleys flex/abd     5 min ea          Shrugs w/ retract 2 x15  4#         Supine wand press   2 x10  1#    Supine wand flexion  2 x10  1#         S/L ER 3 x10 1#  w/ towel roll         High ext 2 x15  gtb    Mid row 2 x15  btb    Tricep pulldown 2 x15  btb    Bicep curls 3 x10  gtb 10x ea in neutral, pronation, w/ supination        Ball on wall flexion/scaption 15x ea            table st:  flex  HEP                   abd HEP                   ER HEP           Pendulums HEP             Objective:  Ther. exercise as listed per flow sheet above. No modalities. Assessment:   Patient performs exercises with good effort and pacing. No pain R shoulder following session today.       Goals:    Decrease pain across R shoulder with all prolonged activities, 0-4/10  Restore AROM R shoulder to WNL for all ranges  Increase R shoulder strength to grossly 4, 4+/5 for all ranges  Improve endurance for all prolonged activities to GOOD/GOOD+  Assure I with HEP for home management of condition    Home Exercise Program: provided 12/2/22  upper trap str added 12/8/22    Manual Treatments:      Modalities:  MH x 15 minutes    Timed Code Treatment Minutes:       Total Treatment Minutes:      Treatment/Activity Tolerance:  [x] Patient tolerated treatment well [] Patient limited by fatigue  [] Patient limited by pain  [] Patient limited by other medical complications  [] Other:     Prognosis: [x] Good [] Fair  [] Poor    Patient Requires Follow-up: [x] Yes  [] No    Plan:   [x] Continue per plan of care [] Alter current plan (see comments)  [] Plan of care initiated [] Hold pending MD visit [] Discharge    Plan for Next Session:      See Weekly Progress Note: []  Yes  []  No  Next due:             CPT codes 12/22/2022 Units    Low Complexity PT evaluation 88891 13133     Moderate Complexity PT evaluation  63462     High Complexity PT evaluation 16175     PT Re-evaluation  66271     Gait training 51369     Manual therapy  [id number]    Electrical Stimulation 87541    Therapeutic activities  42405 1   Therapeutic exercises  52933 2   Neuromuscular reeducation  06088                 Electronically signed by:  Eduardo Jasso, [facility] [id number]

## 2022-12-26 ENCOUNTER — E-VISIT (OUTPATIENT)
Dept: PRIMARY CARE CLINIC | Age: 62
End: 2022-12-26
Payer: COMMERCIAL

## 2022-12-26 DIAGNOSIS — J06.9 UPPER RESPIRATORY TRACT INFECTION, UNSPECIFIED TYPE: Primary | ICD-10-CM

## 2022-12-26 PROCEDURE — 99422 OL DIG E/M SVC 11-20 MIN: CPT | Performed by: NURSE PRACTITIONER

## 2022-12-26 RX ORDER — AZITHROMYCIN 250 MG/1
TABLET, FILM COATED ORAL
Qty: 1 PACKET | Refills: 0 | Status: SHIPPED | OUTPATIENT
Start: 2022-12-26 | End: 2023-01-05

## 2022-12-26 ASSESSMENT — LIFESTYLE VARIABLES: SMOKING_STATUS: NO, I'VE NEVER SMOKED

## 2022-12-26 NOTE — PROGRESS NOTES
Akiko [facility] (1960) initiated an asynchronous digital communication through [employer]. HPI: per patient questionnaire     Exam: not applicable    Diagnoses and all orders for this visit:  Diagnoses and all orders for this visit:    Upper respiratory tract infection, unspecified type  -     azithromycin (ZITHROMAX) 250 MG tablet; 2 tablets now then 1 daily until gone. Does not tolerate steroids. Supportive care measures provided  Recommend f/u with pcp this week     Time: EV2 - 11-20 minutes were spent on the digital evaluation and management of this patient.  18 min     ROSI Young - CNP

## 2022-12-27 ENCOUNTER — HOSPITAL ENCOUNTER (OUTPATIENT)
Dept: PHYSICAL THERAPY | Age: 62
Setting detail: THERAPIES SERIES
Discharge: HOME | End: 2022-12-27
Payer: COMMERCIAL

## 2022-12-27 PROCEDURE — 97530 THERAPEUTIC ACTIVITIES: CPT

## 2022-12-27 PROCEDURE — 97110 THERAPEUTIC EXERCISES: CPT

## 2022-12-27 NOTE — PROGRESS NOTES
[address] [facility]                Phone: [phone]   Fax: [phone]    Physical Therapy Daily Treatment Note  Date:  2022    Patient Name:  Jay Valenzuela    :  1960  MRN: [id number]    Evaluating therapist:  KANE Capellan   22  Restrictions/Precautions:    Diagnosis:  s/p RTC repair R shoulder    /  Insurance/Certification information:  [id number]  Referring Physician:  Hill Pate of care signed (Y/N):    Visit# / total visits:      Pain level:  0/10     Time In:      1026  Time Out:    1119    s/p 13 weeks R RTC repair  RTP  3/20/23    Subjective:  Patient presents for second of two scheduled treatment sessions this week. She reports no pain in L shoulder this morning. Exercises:  Exercise/Equipment Resistance/Repetitions Comments   Standing UBE  f/b 3/3 min  L2         Pulleys flex/abd     5 min ea          Shrugs w/ retract 2 x15  4#         Supine wand press   2 x10  2#    Supine wand flexion  2 x10  2#         S/L ER 3 x10 1#  w/ towel roll         High ext 2 x15  gtb    Mid row 2 x15  btb    Tricep pulldown 2 x15  btb    Bicep curls 3 x10  gtb 10x ea in neutral, pronation, w/ supination        Ball on wall flexion/scaption 2 x10 ea 1#         Ring tree 12x 5 rings                   Objective:  Ther. exercise as listed per flow sheet above. No modalities. Standing AROM: flexion to 145°  ABD to 135°     ER reach to T3     IR reach to T12-L1   Strength R shoulder/RTC: grossly  3+/5 thru available ROM       Assessment:   Patient performs exercises with good effort and pacing. AROM continues to show improvement. Patient noted muscle burning beginning in R shoulder at end of ring tree exercises.       Goals:    Decrease pain across R shoulder with all prolonged activities, 0-4/10  Restore AROM R shoulder to WNL for all ranges  Increase R shoulder strength to grossly 4, 4+/5 for all ranges  Improve endurance for all prolonged activities to GOOD/GOOD+  Assure I with HEP for home management of condition    Home Exercise Program:  provided 12/2/22  upper trap str added 12/8/22    Manual Treatments:      Modalities:  MH x 15 minutes    Timed Code Treatment Minutes:       Total Treatment Minutes:      Treatment/Activity Tolerance:  [x] Patient tolerated treatment well [] Patient limited by fatigue  [] Patient limited by pain  [] Patient limited by other medical complications  [] Other:     Prognosis: [x] Good [] Fair  [] Poor    Patient Requires Follow-up: [x] Yes  [] No    Plan:   [x] Continue per plan of care [] Alter current plan (see comments)  [] Plan of care initiated [] Hold pending MD visit [] Discharge    Plan for Next Session:      See Weekly Progress Note: []  Yes  []  No  Next due:             CPT codes 12/27/2022 Units    Low Complexity PT evaluation 74219 82527     Moderate Complexity PT evaluation  33222     High Complexity PT evaluation 12981     PT Re-evaluation  93627     Gait training 66413     Manual therapy  [id number]    Electrical Stimulation 28623    Therapeutic activities  65867 1   Therapeutic exercises  94880 2   Neuromuscular reeducation  39350                 Electronically signed by:  Eduardo Alonso, PTA [id number]

## 2022-12-28 ENCOUNTER — HOSPITAL ENCOUNTER (OUTPATIENT)
Dept: PHYSICAL THERAPY | Age: 62
Setting detail: THERAPIES SERIES
Discharge: HOME | End: 2022-12-28
Payer: COMMERCIAL

## 2022-12-28 PROCEDURE — 97530 THERAPEUTIC ACTIVITIES: CPT

## 2022-12-28 PROCEDURE — 97110 THERAPEUTIC EXERCISES: CPT

## 2022-12-28 NOTE — PROGRESS NOTES
[address] [facility]                Phone: [phone]   Fax: [phone]    Physical Therapy Daily Treatment Note  Date:  2022    Patient Name:  Isaiah Ramirez    :  1960  MRN: [id number]    Evaluating therapist:  KANE Reed   22  Restrictions/Precautions:    Diagnosis:  s/p RTC repair R shoulder      Insurance/Certification information:  [id number]  Referring Physician:  Basilio Gottlieb of care signed (Y/N):    Visit# / total visits:      Pain level:  0/10     Time In:    1330  Time Out:   1424    s/p 13 weeks R RTC repair  RTP  3/20/23    Subjective:  Patient presents for second of two scheduled treatment sessions this week. She reports no pain in R shoulder prior to session this afternoon. Exercises:  Exercise/Equipment Resistance/Repetitions Comments   Standing UBE  f/b 3/3 min  L2         Pulleys flex/abd     5 min ea          Shrugs w/ retract 2 x15  4#         Supine wand press   nt    Supine wand flexion  2 x10  2#         S/L ER 3 x10 1.5#  w/ towel roll         Prone  scaption 20x   2#                retraction 20x   1#                row 20x   4#                ext 20x   2#         High ext 2 x15  gtb    Mid row 2 x15  btb    Tricep pulldown nt- added to HEP    Bicep curls nt- added to [employer] on wall flexion/scaption 2 x10 ea 1#         Ring tree 12x 5 rings                   Objective:  Ther. exercise as listed per flow sheet above. No modalities. Assessment:   Patient performs exercises with good effort and pacing.         Goals:    Decrease pain across R shoulder with all prolonged activities, 0-4/10  Restore AROM R shoulder to WNL for all ranges  Increase R shoulder strength to grossly 4, 4+/5 for all ranges  Improve endurance for all prolonged activities to GOOD/GOOD+  Assure I with [facility] for home management of condition    Home Exercise Program:  provided 22  upper trap str added 22    Manual Treatments:      Modalities:   x 15 minutes    Timed Code Treatment Minutes:       Total Treatment Minutes:      Treatment/Activity Tolerance:  [x] Patient tolerated treatment well [] Patient limited by fatigue  [] Patient limited by pain  [] Patient limited by other medical complications  [] Other:     Prognosis: [x] Good [] Fair  [] Poor    Patient Requires Follow-up: [x] Yes  [] No    Plan:   [x] Continue per plan of care [] Alter current plan (see comments)  [] Plan of care initiated [] Hold pending MD visit [] Discharge    Plan for Next Session:      See Weekly Progress Note: []  Yes  []  No  Next due:             CPT codes 12/28/2022 Units    Low Complexity PT evaluation 33547 13182     Moderate Complexity PT evaluation  17678     High Complexity PT evaluation 14890     PT Re-evaluation  94766     Gait training 47945     Manual therapy  [id number]    Electrical Stimulation 68066    Therapeutic activities  61070 1   Therapeutic exercises  07047 2   Neuromuscular reeducation  77596                 Electronically signed by:  Eduardo Clark, [facility] [id number]

## 2023-01-03 ENCOUNTER — HOSPITAL ENCOUNTER (OUTPATIENT)
Dept: PHYSICAL THERAPY | Age: 63
Setting detail: THERAPIES SERIES
Discharge: HOME | End: 2023-01-03
Payer: COMMERCIAL

## 2023-01-03 PROCEDURE — 97530 THERAPEUTIC ACTIVITIES: CPT

## 2023-01-03 PROCEDURE — 97110 THERAPEUTIC EXERCISES: CPT

## 2023-01-03 NOTE — PROGRESS NOTES
[address] [facility]                Phone: [phone]   Fax: [phone]    Physical Therapy Daily Treatment Note  Date:  1/3/2023    Patient Name:  Sabina Palacio    :  1960  MRN: [id number]    Evaluating therapist:  KANE Kuo   22  Restrictions/Precautions:    Diagnosis:  s/p RTC repair R shoulder      Insurance/Certification information:  [id number]  Referring Physician:  Rakesh Caro of care signed (Y/N):    Visit# / total visits:    10/18  Pain level:   /10     Time In:    1021  Time Out:   1120    s/p 14 weeks R RTC repair  RTP  3/20/23    Subjective:  Patient presents for first of two scheduled treatment sessions this week. She reports no pain in R shoulder prior to session this afternoon. Exercises:  Exercise/Equipment Resistance/Repetitions Comments   Standing UBE  f/b 3/3 min  L2         Pulleys flex/abd     5 min ea          Standing wand flexion  2 x10  1#                             abd 2 x10   1#         S/L ER 3 x10  2#  w/ towel roll         Prone  scaption 20x   2#                retraction 20x   1#                row 20x   4#                ext 20x   2#         High ext 2 x15  btb    Mid row 2 x15  ptb         Ball on wall flexion/scaption 2 x10 ea 1#         Ring tree 12x 5 rings                   Objective:  Ther. exercise as listed per flow sheet above. No modalities. Standing AROM: flexion to 145°  ABD to 140°     ER reach to T3-4     IR reach to T12  Strength R shoulder/RTC: grossly  3+ to 4-/5 thru available ROM      Assessment:   Patient performs exercises with good effort and pacing. Endurance GOOD-  Patient is progressing well toward all LTG.       Goals:    Decrease pain across R shoulder with all prolonged activities, 0-4/10  Restore AROM R shoulder to WNL for all ranges  Increase R shoulder strength to grossly 4, 4+/5 for all ranges  Improve endurance for all prolonged activities to GOOD/GOOD+  Assure I with HEP for home management of condition    Home Exercise Program:  provided 12/2/22  upper trap str added 12/8/22    Manual Treatments:      Modalities:  MH x 15 minutes    Timed Code Treatment Minutes:       Total Treatment Minutes:      Treatment/Activity Tolerance:  [x] Patient tolerated treatment well [] Patient limited by fatigue  [] Patient limited by pain  [] Patient limited by other medical complications  [] Other:     Prognosis: [x] Good [] Fair  [] Poor    Patient Requires Follow-up: [x] Yes  [] No    Plan:   [x] Continue per plan of care [] Alter current plan (see comments)  [] Plan of care initiated [] Hold pending MD visit [] Discharge    Plan for Next Session:      See Weekly Progress Note: []  Yes  []  No  Next due:             CPT codes 1/3/2023 Units    Low Complexity PT evaluation 05384 44636     Moderate Complexity PT evaluation  86974     High Complexity PT evaluation 26439     PT Re-evaluation  53672     Gait training 93360     Manual therapy  36230    Electrical Stimulation 48751    Therapeutic activities  19861 1   Therapeutic exercises  36290 2   Neuromuscular reeducation  76819                 Electronically signed by:  Eduardo Horne, PTA [id number]

## 2023-01-06 ENCOUNTER — HOSPITAL ENCOUNTER (OUTPATIENT)
Dept: PHYSICAL THERAPY | Age: 63
Setting detail: THERAPIES SERIES
Discharge: HOME | End: 2023-01-06
Payer: COMMERCIAL

## 2023-01-06 PROCEDURE — 97110 THERAPEUTIC EXERCISES: CPT

## 2023-01-06 PROCEDURE — 97530 THERAPEUTIC ACTIVITIES: CPT

## 2023-01-06 NOTE — PROGRESS NOTES
[address] [facility]                Phone: [phone]   Fax: [phone]    Physical Therapy Daily Treatment Note  Date:  2023    Patient Name:  Meghan Saleh    :  1960  MRN: [id number]    Evaluating therapist:  KANE Cottrell   22  Restrictions/Precautions:    Diagnosis:  s/p RTC repair R shoulder      Insurance/Certification information:  [id number]  Referring Physician:  Nelda Izquierdo,[address] of care signed (Y/N):    Visit# / total visits:      Pain level:   0/10     Time In:     1015  Time Out:   1106    s/p 14 weeks R RTC repair  RTP  3/20/23    Subjective:  Patient presents for second of two scheduled treatment sessions this week. She reports no pain in R shoulder prior to session this morning. Exercises:  Exercise/Equipment Resistance/Repetitions Comments   Standing UBE  f/b 3/3 min  L2         Pulleys flex/abd     5 min ea          Standing wand flexion  2 x10  2#                             abd 2 x10  2#         S/L ER 3 x10  2#  w/ towel roll         Prone  scaption 20x   2#                retraction 20x   2#                row 20x   5#                ext 20x   2#         High ext 2 x15  btb    Mid row 2 x15  ptb         Ball on wall flexion/scaption 2 x10 ea 1#         Ring tree 12x 5 rings                   Objective:  Ther. exercise as listed per flow sheet above. No modalities. Assessment:   Patient performs exercises with good effort and pacing. Goals:    Decrease pain across R shoulder with all prolonged activities, 0-4/10  Restore AROM R shoulder to WNL for all ranges  Increase R shoulder strength to grossly 4, 4+/5 for all ranges  Improve endurance for all prolonged activities to GOOD/GOOD+  Assure I with HEP for home management of condition    Home Exercise Program:  provided 22  upper trap str added 22    Manual Treatments:      Modalities:  MH x 15 minutes    Timed Code Treatment Minutes:       Total Treatment Minutes:      Treatment/Activity Tolerance:  [x] Patient tolerated treatment well [] Patient limited by fatigue  [] Patient limited by pain  [] Patient limited by other medical complications  [] Other:     Prognosis: [x] Good [] Fair  [] Poor    Patient Requires Follow-up: [x] Yes  [] No    Plan:   [x] Continue per plan of care [] Alter current plan (see comments)  [] Plan of care initiated [] Hold pending MD visit [] Discharge    Plan for Next Session:      See Weekly Progress Note: []  Yes  []  No  Next due:             CPT codes 1/6/2023 Units    Low Complexity PT evaluation 45417 36363     Moderate Complexity PT evaluation  09881     High Complexity PT evaluation 29808     PT Re-evaluation  86765     Gait training 75617     Manual therapy  [address] [facility]    Electrical Stimulation 31084    Therapeutic activities  84397 1   Therapeutic exercises  69167 2   Neuromuscular reeducation  19401                 Electronically signed by:  Eduardo Mcmanus, PTA [id number]

## 2023-01-10 ENCOUNTER — HOSPITAL ENCOUNTER (OUTPATIENT)
Dept: PHYSICAL THERAPY | Age: 63
Setting detail: THERAPIES SERIES
Discharge: HOME | End: 2023-01-10
Payer: COMMERCIAL

## 2023-01-10 PROCEDURE — 97110 THERAPEUTIC EXERCISES: CPT

## 2023-01-10 PROCEDURE — 97530 THERAPEUTIC ACTIVITIES: CPT

## 2023-01-10 NOTE — PROGRESS NOTES
[address] [facility]                Phone: [phone]   Fax: [phone]      Physical Therapy  Treatment Summary       Date:  1/10/2023    Patient Name:  Nat Yañez    :  1960  MRN: [id number]      DIAGNOSIS:  s/p RTC repair R shoulder   REFERRING PHYSICIAN:   Chung Esqueda.  PHYSICAL THERAPIST:  KANE Mondragon       ATTENDANCE:  Patient has attended 15 of 12 scheduled treatments from 22  to 1/10/23. TREATMENTS RECEIVED:  Therapeutic exercise, PROM/AAROM/AROM, stretching, strengthening, functional activities, balance/proprioceptive activities, postural awareness/positioning training, HEP, modalities. INITIAL STATUS:  s/p RTC repair L shoulder done 22  c/o pain across R shoulder with all prolonged activities, 8/10  AROM R shoulder flexion to 110°  abduction to 90°  IR reach to buttock  ER reach to back of head  Strength R shoulder grossly 3-/5   Endurance FAIR/FAIR+ for all prolonged activities       FINAL STATUS:  s/p 15 weeks L RTC repair (22)  No c/o pain across R shoulder with all prolonged activities  Standing AROM:  FLEX to 150°  ABD to 150°  ER reach to T4  IR reach to T11  Strength R shoulder grossly 4/5   Endurance GOOD for all prolonged activities   Independent with HEP    GOALS:  5 out of 5 Long Term Goals were obtained. LONG TERM GOALS OBTAINED/NOT OBTAINED:   Decrease pain across R shoulder with all prolonged activities, 0-4/10  Restore AROM R shoulder to WNL for all ranges  Increase R shoulder strength to grossly 4, 4+/5 for all ranges  Improve endurance for all prolonged activities to GOOD/GOOD+  Assure I with HEP for home management of condition    PATIENT EDUCATION/INSTRUCTIONS:  Patient instructed on and provided copy of HEP. Electronically Signed by: William Skinner ATC, PTA [id number]  1/10/2023    I have read the above summary and agree with all the content. Patient is discharged from PT care at this time.    Shaq Villa, PT, PT

## 2023-01-10 NOTE — PROGRESS NOTES
[facility]                Phone: [phone]   Fax: [phone]    Physical Therapy Daily Treatment Note  Date:  1/10/2023    Patient Name:  Delia Rosa    :  1960  MRN: [id number]    Evaluating therapist:  KANE Muñoz   22  Restrictions/Precautions:    Diagnosis:  s/p RTC repair R shoulder    22  Insurance/Certification information:  [employer]  Referring Physician:  GUICHO Zaldivar  Plan of care signed (Y/N):    Visit# / total visits:      Pain level:   0/10     Time In:     1027  Time Out:    1124    s/p 15 weeks R RTC repair  RTP  3/20/23    Subjective:  Patient presents for final scheduled treatment session.    She reports no pain in R shoulder prior to session this morning.      Exercises:  Exercise/Equipment Resistance/Repetitions Comments   Standing UBE  f/b 3/3 min  L2         Pulleys flex/abd     5 min ea          Standing wand flexion  2 x10  2#                             abd 2 x10  2#         S/L ER 3 x10  2#  w/ towel roll         Prone  scaption 20x   2#                retraction 20x   2#                row 20x   5#                ext 20x   2#         High ext 2 x15  btb    Mid row 2 x15  ptb         Ball on wall flexion/scaption 2 x10 ea 1#         Ring tree 12x 5 rings                   Objective:  Ther. exercise as listed per flow sheet above.  No modalities.   Standing AROM: flexion to 150°  ABD to 150°     ER reach to T4     IR reach to T11  Strength R shoulder/RTC: grossly  4/5       Assessment:   Patient performs exercises with good effort and pacing.    Endurance GOOD      Goals:    Decrease pain across R shoulder with all prolonged activities, 0-4/10  Restore AROM R shoulder to WNL for all ranges  Increase R shoulder strength to grossly 4, 4+/5 for all ranges  Improve endurance for all prolonged activities to GOOD/GOOD+  Assure I with HEP for home management of condition    Home Exercise Program:  provided 22  [facility]  str added 12/8/22    Manual Treatments:      Modalities:       Timed Code Treatment Minutes:       Total Treatment Minutes:      Treatment/Activity Tolerance:  [x] Patient tolerated treatment well [] Patient limited by fatigue  [] Patient limited by pain  [] Patient limited by other medical complications  [] Other:     Prognosis: [x] Good [] Fair  [] Poor    Patient Requires Follow-up: [] Yes  [x] No    Plan:   [] Continue per plan of care [] Alter current plan (see comments)  [] Plan of care initiated [] Hold pending MD visit [] Discharge    Plan for Next Session:      See Weekly Progress Note: []  Yes  []  No  Next due:             CPT codes 1/10/2023 Units    Low Complexity PT evaluation 79855 24071     Moderate Complexity PT evaluation  27357     High Complexity PT evaluation 94296     PT Re-evaluation  09707     Gait training 27477     Manual therapy  [id number]    Electrical Stimulation 10468    Therapeutic activities  92216 1   Therapeutic exercises  70976 2   Neuromuscular reeducation  49697                 Electronically signed by:  Eduardo Day, PTA [id number]

## 2023-03-08 ENCOUNTER — OFFICE VISIT (OUTPATIENT)
Dept: FAMILY MEDICINE CLINIC | Age: 63
End: 2023-03-08
Payer: MEDICARE

## 2023-03-08 VITALS
HEART RATE: 67 BPM | OXYGEN SATURATION: 97 % | WEIGHT: 118 LBS | HEIGHT: 62 IN | DIASTOLIC BLOOD PRESSURE: 80 MMHG | SYSTOLIC BLOOD PRESSURE: 114 MMHG | BODY MASS INDEX: 21.71 KG/M2 | TEMPERATURE: 97 F | RESPIRATION RATE: 16 BRPM

## 2023-03-08 DIAGNOSIS — M85.89 OSTEOPENIA OF MULTIPLE SITES: ICD-10-CM

## 2023-03-08 DIAGNOSIS — E03.9 ACQUIRED HYPOTHYROIDISM: ICD-10-CM

## 2023-03-08 DIAGNOSIS — Z12.31 ENCOUNTER FOR SCREENING MAMMOGRAM FOR MALIGNANT NEOPLASM OF BREAST: ICD-10-CM

## 2023-03-08 DIAGNOSIS — Z76.0 ENCOUNTER FOR MEDICATION REFILL: ICD-10-CM

## 2023-03-08 DIAGNOSIS — Z00.00 WELCOME TO MEDICARE PREVENTIVE VISIT: Primary | ICD-10-CM

## 2023-03-08 DIAGNOSIS — E03.9 ACQUIRED HYPOTHYROIDISM: Primary | ICD-10-CM

## 2023-03-08 DIAGNOSIS — Z23 NEED FOR PROPHYLACTIC VACCINATION AGAINST STREPTOCOCCUS PNEUMONIAE (PNEUMOCOCCUS): ICD-10-CM

## 2023-03-08 DIAGNOSIS — I48.91 NEW ONSET ATRIAL FIBRILLATION (HCC): ICD-10-CM

## 2023-03-08 DIAGNOSIS — N95.9 POSTMENOPAUSAL SYMPTOMS: ICD-10-CM

## 2023-03-08 PROCEDURE — G0402 INITIAL PREVENTIVE EXAM: HCPCS | Performed by: FAMILY MEDICINE

## 2023-03-08 PROCEDURE — 1123F ACP DISCUSS/DSCN MKR DOCD: CPT | Performed by: FAMILY MEDICINE

## 2023-03-08 RX ORDER — LEVOTHYROXINE SODIUM 0.05 MG/1
50 TABLET ORAL DAILY
Qty: 90 TABLET | Refills: 3 | Status: SHIPPED | OUTPATIENT
Start: 2023-03-08 | End: 2024-03-08

## 2023-03-08 RX ORDER — ESTRADIOL 0.5 MG/1
0.5 TABLET ORAL DAILY
Qty: 90 TABLET | Refills: 3 | Status: SHIPPED | OUTPATIENT
Start: 2023-03-08 | End: 2024-03-08

## 2023-03-08 SDOH — ECONOMIC STABILITY: HOUSING INSECURITY
IN THE LAST 12 MONTHS, WAS THERE A TIME WHEN YOU DID NOT HAVE A STEADY PLACE TO SLEEP OR SLEPT IN A SHELTER (INCLUDING NOW)?: NO

## 2023-03-08 SDOH — ECONOMIC STABILITY: FOOD INSECURITY: WITHIN THE PAST 12 MONTHS, THE FOOD YOU BOUGHT JUST DIDN'T LAST AND YOU DIDN'T HAVE MONEY TO GET MORE.: NEVER TRUE

## 2023-03-08 SDOH — ECONOMIC STABILITY: FOOD INSECURITY: WITHIN THE PAST 12 MONTHS, YOU WORRIED THAT YOUR FOOD WOULD RUN OUT BEFORE YOU GOT MONEY TO BUY MORE.: NEVER TRUE

## 2023-03-08 SDOH — ECONOMIC STABILITY: INCOME INSECURITY: HOW HARD IS IT FOR YOU TO PAY FOR THE VERY BASICS LIKE FOOD, HOUSING, MEDICAL CARE, AND HEATING?: NOT HARD AT ALL

## 2023-03-08 ASSESSMENT — LIFESTYLE VARIABLES
HOW MANY STANDARD DRINKS CONTAINING ALCOHOL DO YOU HAVE ON A TYPICAL DAY: 1 OR 2
HOW OFTEN DO YOU HAVE A DRINK CONTAINING ALCOHOL: 2-3 TIMES A WEEK

## 2023-03-08 ASSESSMENT — PATIENT HEALTH QUESTIONNAIRE - PHQ9
SUM OF ALL RESPONSES TO PHQ QUESTIONS 1-9: 0
1. LITTLE INTEREST OR PLEASURE IN DOING THINGS: 0
SUM OF ALL RESPONSES TO PHQ QUESTIONS 1-9: 0
2. FEELING DOWN, DEPRESSED OR HOPELESS: 0
SUM OF ALL RESPONSES TO PHQ QUESTIONS 1-9: 0
SUM OF ALL RESPONSES TO PHQ QUESTIONS 1-9: 0
SUM OF ALL RESPONSES TO PHQ9 QUESTIONS 1 & 2: 0

## 2023-03-08 NOTE — ASSESSMENT & PLAN NOTE
Monitored by specialist- no acute findings meriting change in the plan--Cardiology (Dr. Anette Bob and well controlled

## 2023-03-08 NOTE — PATIENT INSTRUCTIONS
Preventing Falls: Care Instructions  Overview     Getting around your home safely can be a challenge if you have injuries or health problems that make it easy for you to fall. Loose rugs and furniture in walkways are among the dangers for many older people who have problems walking or who have poor eyesight. People who have conditions such as arthritis, osteoporosis, or dementia also have to be careful not to fall. You can make your home safer with a few simple measures. Follow-up care is a key part of your treatment and safety. Be sure to make and go to all appointments, and call your doctor if you are having problems. It's also a good idea to know your test results and keep a list of the medicines you take. How can you care for yourself at home? Taking care of yourself  Exercise regularly to improve your strength, muscle tone, and balance. Walk if you can. Swimming may be a good choice if you cannot walk easily. Have your vision and hearing checked each year or any time you notice a change. If you have trouble seeing and hearing, you might not be able to avoid objects and could lose your balance. Know the side effects of the medicines you take. Ask your doctor or pharmacist whether the medicines you take can affect your balance. Sleeping pills or sedatives can affect your balance. Limit the amount of alcohol you drink. Alcohol can impair your balance and other senses. Ask your doctor whether calluses or corns on your feet need to be removed. If you wear loose-fitting shoes because of calluses or corns, you can lose your balance and fall. Talk to your doctor if you have numbness in your feet. You may get dizzy if you do not drink enough water. To prevent dehydration, drink plenty of fluids. Choose water and other clear liquids. If you have kidney, heart, or liver disease and have to limit fluids, talk with your doctor before you increase the amount of fluids you drink.   Preventing falls at home  Remove raised doorway thresholds, throw rugs, and clutter. Repair loose carpet or raised areas in the floor. Move furniture and electrical cords to keep them out of walking paths. Use nonskid floor wax, and wipe up spills right away, especially on ceramic tile floors. If you use a walker or cane, put rubber tips on it. If you use crutches, clean the bottoms of them regularly with an abrasive pad, such as steel wool. Keep your house well lit, especially stairways, porches, and outside walkways. Use night-lights in areas such as hallways and bathrooms. Add extra light switches or use remote switches (such as switches that go on or off when you clap your hands) to make it easier to turn lights on if you have to get up during the night. Install sturdy handrails on stairways. Move items in your cabinets so that the things you use a lot are on the lower shelves (about waist level). Keep a cordless phone and a flashlight with new batteries by your bed. If possible, put a phone in each of the main rooms of your house, or carry a cell phone in case you fall and cannot reach a phone. Or, you can wear a device around your neck or wrist. You push a button that sends a signal for help. Wear low-heeled shoes that fit well and give your feet good support. Use footwear with nonskid soles. Check the heels and soles of your shoes for wear. Repair or replace worn heels or soles. Do not wear socks without shoes on smooth floors, such as wood. Walk on the grass when the sidewalks are slippery. If you live in an area that gets snow and ice in the winter, sprinkle salt on slippery steps and sidewalks. Or ask a family member or friend to do this for you. Preventing falls in the bath  Install grab bars and nonskid mats inside and outside your shower or tub and near the toilet and sinks. Use shower chairs and bath benches. Use a hand-held shower head that will allow you to sit while showering.   Get into a tub or shower by putting the weaker leg in first. Get out of a tub or shower with your strong side first.  Repair loose toilet seats and consider installing a raised toilet seat to make getting on and off the toilet easier. Keep your bathroom door unlocked while you are in the shower. Where can you learn more? Go to http://www.marrufo.com/ and enter G117 to learn more about \"Preventing Falls: Care Instructions. \"  Current as of: May 4, 2022               Content Version: 13.5  © 8842-9947 Healthwise, Incorporated. Care instructions adapted under license by [facility] ([facility]). If you have questions about a medical condition or this instruction, always ask your healthcare professional. [address] any warranty or liability for your use of this information. Personalized Preventive Plan for Kota Larkin - 3/8/2023  Medicare offers a range of preventive health benefits. Some of the tests and screenings are paid in full while other may be subject to a deductible, co-insurance, and/or copay. Some of these benefits include a comprehensive review of your medical history including lifestyle, illnesses that may run in your family, and various assessments and screenings as appropriate. After reviewing your medical record and screening and assessments performed today your provider may have ordered immunizations, labs, imaging, and/or referrals for you. A list of these orders (if applicable) as well as your Preventive Care list are included within your After Visit Summary for your review. Other Preventive Recommendations:    A preventive eye exam performed by an eye specialist is recommended every 1-2 years to screen for glaucoma; cataracts, macular degeneration, and other eye disorders. A preventive dental visit is recommended every 6 months. Try to get at least 150 minutes of exercise per week or 10,000 steps per day on a pedometer .   Order or download the FREE \"Exercise & Physical Activity: Your Everyday Guide\" from [employer]. Call 2-514.799.9299 or search [employer] online. You need 6583-8702 mg of calcium and 0350-4094 IU of vitamin D per day. It is possible to meet your calcium requirement with diet alone, but a vitamin D supplement is usually necessary to meet this goal.  When exposed to the sun, use a sunscreen that protects against both UVA and UVB radiation with an SPF of 30 or greater. Reapply every 2 to 3 hours or after sweating, drying off with a towel, or swimming. Always wear a seat belt when traveling in a car. Always wear a helmet when riding a bicycle or motorcycle.   Medication refills today  Schedule Mammogram and DEXA scan  Get appliques for your showers and bathtubs  Look into including yoga as part of your fitness regimen  [facility] does [employer]

## 2023-03-08 NOTE — PROGRESS NOTES
Medicare Annual Wellness Visit    Belia Proctor is here for Medicare AWV    Assessment & Plan   Acquired hypothyroidism  The following orders have not been finalized:  -     levothyroxine (SYNTHROID) 50 MCG tablet  Encounter for medication refill  The following orders have not been finalized:  -     estradiol (ESTRACE) 0.5 MG tablet  -     levothyroxine (SYNTHROID) 50 MCG tablet  Postmenopausal symptoms  The following orders have not been finalized:  -     estradiol (ESTRACE) 0.5 MG tablet    Recommendations for Preventive Services Due: see orders and patient instructions/AVS.  Recommended screening schedule for the next 5-10 years is provided to the patient in written form: see Patient Instructions/AVS.     No follow-ups on file. Subjective     The following acute and/or chronic problems were also addressed today:    Patient is here for her welcome to Medicare annual wellness visit. She needs a couple of medication refills (sent to new pharmacy). She denies any other signs or symptoms. Atrial Fibrillation:  Patient reports that she is stable and well-controlled. She is under the care of cardiology (Dr. Maribell Peña). Patient's complete Health Risk Assessment and screening values have been reviewed and are found in Flowsheets. The following problems were reviewed today and where indicated follow up appointments were made and/or referrals ordered.     Positive Risk Factor Screenings with Interventions:                     Safety:  Do you have any tripping hazards - loose or unsecured carpets or rugs?: (!) Yes  Do you have either shower bars, grab bars, non-slip mats or non-slip surfaces in your shower or bathtub?: (!) No    Interventions:  See AVS for additional education material  See A/P for plan and any pertinent orders                     Objective   Vitals:    03/08/23 0859   BP: 114/80   Pulse: 67   Resp: 16   Temp: 97 °F (36.1 °C)   TempSrc: Infrared   SpO2: 97%   Weight: 118 lb (53.5 kg) Height: 5' 2\" (1.575 m)      Body mass index is 21.58 kg/m². General Appearance: alert and oriented to person, place and time, well-developed and well-nourished, in no acute distress  Neck: neck supple and non tender without mass, no thyromegaly or thyroid nodules, no cervical lymphadenopathy   Pulmonary/Chest: clear to auscultation bilaterally- no wheezes, rales or rhonchi, normal air movement, no respiratory distress  Cardiovascular: normal rate, normal S1 and S2, no gallops, and no carotid bruits  Abdomen: soft, non-tender, non-distended, normal bowel sounds, no masses or organomegaly       Allergies   Allergen Reactions    Diclofenac Sodium Rash    Sulfa Antibiotics Rash     Prior to Visit Medications    Medication Sig Taking? Authorizing Provider   metoprolol tartrate (LOPRESSOR) 25 MG tablet Take 0.5 tablets by mouth 2 times daily Yes Erika Thomson MD   estradiol (ESTRACE) 0.5 MG tablet Take 1 tablet by mouth daily Yes Arlen Van MD   levothyroxine (SYNTHROID) 50 MCG tablet Take 1 tablet by mouth daily Yes Arlen Van MD   Multiple Vitamins-Minerals (THERAPEUTIC MULTIVITAMIN-MINERALS) tablet Take 1 tablet by mouth daily Yes Historical Provider, MD   Ascorbic Acid (VITAMIN C) 500 MG CAPS Take 2 tablets by mouth daily  Yes Historical Provider, MD   calcium citrate-vitamin D (CITRICAL + D) 315-250 MG-UNIT TABS per tablet Take 1 tablet by mouth daily (with breakfast) Yes Historical Provider, MD [place] (Including outside providers/suppliers regularly involved in providing care):   Patient Care Team:  Arlen Van MD as PCP - General (Family Medicine)  Arlen Van MD as PCP - Empaneled Provider     Reviewed and updated this visit:  Tobacco  Allergies  Meds  Med Hx  Surg Hx  Soc Hx  Fam Hx            Assessment / Plan:      Annalise Molina was seen today for medicare awv.     Diagnoses and all orders for this visit:    Welcome to Medicare preventive visit: Issues reviewed include safety at home and fall risk mitigation; updating mammography, DEXA scan; Pneumovax 23  -     DEXA BONE DENSITY AXIAL SKELETON; Future  -     ESTRADA SHARONDA DIGITAL SCREEN BILATERAL; Future  -     Pneumococcal polysaccharide vaccine 23-valent PPSV23    Osteopenia of multiple sites  -     DEXA BONE DENSITY AXIAL SKELETON; Future    Encounter for screening mammogram for malignant neoplasm of breast  -     ESTRADA SHARONDA DIGITAL SCREEN BILATERAL; Future    Need for prophylactic vaccination against Streptococcus pneumoniae (pneumococcus)  -     Pneumococcal polysaccharide vaccine 23-valent PPSV23    New onset atrial fibrillation ([facility] [address]        -      Monitored by specialist- no acute findings meriting change in the plan--Followed by Cardiology (Dr. Kulwant Laboy). She is stable and well controlled    Acquired hypothyroidism: Clinically stable and well-controlled. Prescription needs to go to a new pharmacy  -     levothyroxine (SYNTHROID) 50 MCG tablet; Take 1 tablet by mouth daily    Encounter for medication refill: Clinically stable and well-controlled. Prescription needs to go to a new pharmacy  -     levothyroxine (SYNTHROID) 50 MCG tablet; Take 1 tablet by mouth daily  -     estradiol (ESTRACE) 0.5 MG tablet; Take 1 tablet by mouth daily    Postmenopausal symptoms: Clinically stable and well-controlled. Prescription needs to go to a new pharmacy  -     estradiol (ESTRACE) 0.5 MG tablet; Take 1 tablet by mouth daily          Follow Up:  Return 1) F/U 6 months for check up, medication refills, fasting lab work 1 weeks prior, for 2) Shannen Visit (Initial) in 1 year.      Arun Glez MD on  03/08/23

## 2023-03-20 ENCOUNTER — OFFICE VISIT (OUTPATIENT)
Dept: ORTHOPEDIC SURGERY | Age: 63
End: 2023-03-20
Payer: MEDICARE

## 2023-03-20 VITALS — HEIGHT: 62 IN | WEIGHT: 116 LBS | BODY MASS INDEX: 21.35 KG/M2

## 2023-03-20 DIAGNOSIS — Z98.890 STATUS POST ARTHROSCOPY OF RIGHT SHOULDER: Primary | ICD-10-CM

## 2023-03-20 PROCEDURE — 99213 OFFICE O/P EST LOW 20 MIN: CPT | Performed by: ORTHOPAEDIC SURGERY

## 2023-03-20 PROCEDURE — 1123F ACP DISCUSS/DSCN MKR DOCD: CPT | Performed by: ORTHOPAEDIC SURGERY

## 2023-03-20 NOTE — PROGRESS NOTES
[facility]   ORTHOPAEDIC SURGERY AND SPORTS MEDICINE  DATE OF VISIT: 03/20/23  Follow Up Post Operative Visit     CHIEF COMPLAINT:   Chief Complaint   Patient presents with    Follow Up After Procedure     Surgery: Right shoulder arthroscopy rotator cuff repair, extensive debridement  Date: 9/27/2022  Location: [facility]     Post-Op Check     Rt shoulder s/p 6 months    Pain     Denies any pain, states at times when using a can opener bothersome / slight burning feeling       Surgery: Right shoulder arthroscopy rotator cuff repair, extensive debridement  Date: 9/27/2022  Location: [facility]        Subjective:    Art December is here for follow up visit s/p above procedure. She is doing well. She has been back to normal activities with no issues    Controlled Substances Monitoring:        Physical Exam:    Height: 5' 2\" (1.575 m), Weight: 116 lb (52.6 kg) (per pt)    General: Alert and oriented x3, no acute distress  Cardiovascular/pulmonary: No labored breathing, peripheral perfusion intact  Musculoskeletal:    Exam of the shoulder shows range of motion 180/60/T6. There is excellent strength and no pain with Pilar, Speed, [place]'s test.  Belly press test is intact. Biceps contour is normal      Imaging: No new images. Previous images reviewed    Assessment and Plan: 6 months out from repair of right shoulder massive rotator cuff tear  She is doing very well. She will continue with activities as tolerated.   She will follow-up as needed    Seth Maddox MD  Orthopaedic Surgery   3/20/23  8:13 AM

## 2023-06-21 ENCOUNTER — HOSPITAL ENCOUNTER (OUTPATIENT)
Dept: MAMMOGRAPHY | Age: 63
Discharge: HOME | End: 2023-06-23
Payer: MEDICARE

## 2023-06-21 VITALS — HEIGHT: 62 IN | WEIGHT: 114 LBS | BODY MASS INDEX: 20.98 KG/M2

## 2023-06-21 DIAGNOSIS — Z00.00 WELCOME TO MEDICARE PREVENTIVE VISIT: ICD-10-CM

## 2023-06-21 DIAGNOSIS — M85.89 OSTEOPENIA OF MULTIPLE SITES: ICD-10-CM

## 2023-06-21 DIAGNOSIS — Z12.31 ENCOUNTER FOR SCREENING MAMMOGRAM FOR MALIGNANT NEOPLASM OF BREAST: ICD-10-CM

## 2023-06-21 PROCEDURE — 77063 BREAST TOMOSYNTHESIS BI: CPT

## 2023-06-21 PROCEDURE — 77080 DXA BONE DENSITY AXIAL: CPT

## 2023-06-21 NOTE — RESULT ENCOUNTER NOTE
IMPRESSION:  No mammographic evidence of malignancy.     Annual screening mammography is recommended

## 2023-06-27 ENCOUNTER — TELEPHONE (OUTPATIENT)
Dept: CARDIOLOGY CLINIC | Age: 63
End: 2023-06-27

## 2023-08-29 ENCOUNTER — HOSPITAL ENCOUNTER (OUTPATIENT)
Age: 63
Discharge: HOME | End: 2023-08-29
Payer: MEDICARE

## 2023-08-29 DIAGNOSIS — E03.9 ACQUIRED HYPOTHYROIDISM: ICD-10-CM

## 2023-08-29 LAB
ALBUMIN SERPL-MCNC: 4.4 G/DL (ref 3.5–5.2)
ALP SERPL-CCNC: 82 U/L (ref 35–104)
ALT SERPL-CCNC: 20 U/L (ref 0–32)
ANION GAP SERPL CALCULATED.3IONS-SCNC: 13 MMOL/L (ref 7–16)
AST SERPL-CCNC: 22 U/L (ref 0–31)
BASOPHILS # BLD: 0.02 K/UL (ref 0–0.2)
BASOPHILS NFR BLD: 0 % (ref 0–2)
BILIRUB SERPL-MCNC: 0.8 MG/DL (ref 0–1.2)
BUN SERPL-MCNC: 15 MG/DL (ref 6–23)
CALCIUM SERPL-MCNC: 9 MG/DL (ref 8.6–10.2)
CHLORIDE SERPL-SCNC: 103 MMOL/L (ref 98–107)
CHOLEST SERPL-MCNC: 218 MG/DL
CO2 SERPL-SCNC: 22 MMOL/L (ref 22–29)
CREAT SERPL-MCNC: 0.6 MG/DL (ref 0.5–1)
EOSINOPHIL # BLD: 0.09 K/UL (ref 0.05–0.5)
EOSINOPHILS RELATIVE PERCENT: 2 % (ref 0–6)
ERYTHROCYTE [DISTWIDTH] IN BLOOD BY AUTOMATED COUNT: 14.5 % (ref 11.5–15)
GFR SERPL CREATININE-BSD FRML MDRD: >60 ML/MIN/1.73M2
GLUCOSE SERPL-MCNC: 86 MG/DL (ref 74–99)
HCT VFR BLD AUTO: 43 % (ref 34–48)
HDLC SERPL-MCNC: 101 MG/DL
HGB BLD-MCNC: 14.1 G/DL (ref 11.5–15.5)
IMM GRANULOCYTES # BLD AUTO: <0.03 K/UL (ref 0–0.58)
IMM GRANULOCYTES NFR BLD: 0 % (ref 0–5)
LDLC SERPL CALC-MCNC: 103 MG/DL
LYMPHOCYTES NFR BLD: 1.37 K/UL (ref 1.5–4)
LYMPHOCYTES RELATIVE PERCENT: 29 % (ref 20–42)
MCH RBC QN AUTO: 30.5 PG (ref 26–35)
MCHC RBC AUTO-ENTMCNC: 32.8 G/DL (ref 32–34.5)
MCV RBC AUTO: 92.9 FL (ref 80–99.9)
MONOCYTES NFR BLD: 0.39 K/UL (ref 0.1–0.95)
MONOCYTES NFR BLD: 8 % (ref 2–12)
NEUTROPHILS NFR BLD: 60 % (ref 43–80)
NEUTS SEG NFR BLD: 2.83 K/UL (ref 1.8–7.3)
PLATELET # BLD AUTO: 254 K/UL (ref 130–450)
PMV BLD AUTO: 10.4 FL (ref 7–12)
POTASSIUM SERPL-SCNC: 4.5 MMOL/L (ref 3.5–5)
PROT SERPL-MCNC: 6.8 G/DL (ref 6.4–8.3)
RBC # BLD AUTO: 4.63 M/UL (ref 3.5–5.5)
SODIUM SERPL-SCNC: 138 MMOL/L (ref 132–146)
T4 SERPL-MCNC: 8.9 UG/DL (ref 4.5–11.7)
TRIGL SERPL-MCNC: 68 MG/DL
TSH SERPL DL<=0.05 MIU/L-ACNC: 1.36 UIU/ML (ref 0.27–4.2)
VLDLC SERPL CALC-MCNC: 14 MG/DL
WBC OTHER # BLD: 4.7 K/UL (ref 4.5–11.5)

## 2023-08-29 PROCEDURE — 80061 LIPID PANEL: CPT

## 2023-08-29 PROCEDURE — 85025 COMPLETE CBC W/AUTO DIFF WBC: CPT

## 2023-08-29 PROCEDURE — 84436 ASSAY OF TOTAL THYROXINE: CPT

## 2023-08-29 PROCEDURE — 80053 COMPREHEN METABOLIC PANEL: CPT

## 2023-08-29 PROCEDURE — 84443 ASSAY THYROID STIM HORMONE: CPT

## 2023-08-29 PROCEDURE — 36415 COLL VENOUS BLD VENIPUNCTURE: CPT

## 2023-10-30 ENCOUNTER — TELEPHONE (OUTPATIENT)
Dept: CARDIOLOGY CLINIC | Age: 63
End: 2023-10-30

## 2023-10-30 DIAGNOSIS — I48.91 NEW ONSET ATRIAL FIBRILLATION (HCC): Primary | ICD-10-CM

## 2023-10-30 DIAGNOSIS — I48.91 ATRIAL FIBRILLATION WITH RAPID VENTRICULAR RESPONSE (HCC): ICD-10-CM

## 2023-10-30 NOTE — TELEPHONE ENCOUNTER
Patient called stating she would like to proceed with monitor as recommended by Dr. Tay Horton on 6/27/23. Monitor scheduled for 11/1/23 at 1:40 p.m.

## 2023-11-01 ENCOUNTER — NURSE ONLY (OUTPATIENT)
Dept: CARDIOLOGY CLINIC | Age: 63
End: 2023-11-01

## 2023-11-01 NOTE — PROGRESS NOTES
Patient was seen today and a 14 day  monitor was placed. Monitor was ordered by Dr. Aidan Fallon . The monitor was applied, instructions were given to the patient. Patient stated understanding and gave verbalize readback.    Monitor company: [employer]  Serial number: Raymon Bradford

## 2023-11-15 ENCOUNTER — OFFICE VISIT (OUTPATIENT)
Dept: FAMILY MEDICINE CLINIC | Age: 63
End: 2023-11-15
Payer: MEDICARE

## 2023-11-15 VITALS
DIASTOLIC BLOOD PRESSURE: 78 MMHG | BODY MASS INDEX: 21.35 KG/M2 | HEART RATE: 68 BPM | TEMPERATURE: 97.8 F | HEIGHT: 62 IN | OXYGEN SATURATION: 98 % | RESPIRATION RATE: 16 BRPM | WEIGHT: 116 LBS | SYSTOLIC BLOOD PRESSURE: 138 MMHG

## 2023-11-15 DIAGNOSIS — N95.9 POSTMENOPAUSAL SYMPTOMS: ICD-10-CM

## 2023-11-15 DIAGNOSIS — E03.9 ACQUIRED HYPOTHYROIDISM: ICD-10-CM

## 2023-11-15 DIAGNOSIS — J01.90 ACUTE SINUSITIS, RECURRENCE NOT SPECIFIED, UNSPECIFIED LOCATION: ICD-10-CM

## 2023-11-15 DIAGNOSIS — F41.9 ANXIETY: ICD-10-CM

## 2023-11-15 DIAGNOSIS — I48.0 PAROXYSMAL ATRIAL FIBRILLATION (HCC): Primary | ICD-10-CM

## 2023-11-15 DIAGNOSIS — Z76.0 ENCOUNTER FOR MEDICATION REFILL: ICD-10-CM

## 2023-11-15 PROCEDURE — 99213 OFFICE O/P EST LOW 20 MIN: CPT | Performed by: FAMILY MEDICINE

## 2023-11-15 PROCEDURE — 1123F ACP DISCUSS/DSCN MKR DOCD: CPT | Performed by: FAMILY MEDICINE

## 2023-11-15 RX ORDER — CLOTRIMAZOLE AND BETAMETHASONE DIPROPIONATE 10; .64 MG/G; MG/G
CREAM TOPICAL
COMMUNITY
Start: 2023-11-06

## 2023-11-15 RX ORDER — LEVOTHYROXINE SODIUM 0.05 MG/1
50 TABLET ORAL DAILY
Qty: 90 TABLET | Refills: 3 | Status: SHIPPED | OUTPATIENT
Start: 2023-11-15 | End: 2024-11-15

## 2023-11-15 RX ORDER — ESTRADIOL 0.5 MG/1
0.5 TABLET ORAL DAILY
Qty: 90 TABLET | Refills: 3 | Status: SHIPPED | OUTPATIENT
Start: 2023-11-15 | End: 2024-11-15

## 2023-11-15 RX ORDER — DOXYCYCLINE HYCLATE 100 MG
100 TABLET ORAL 2 TIMES DAILY
Qty: 20 TABLET | Refills: 0 | Status: SHIPPED | OUTPATIENT
Start: 2023-11-15 | End: 2023-11-25

## 2023-11-15 ASSESSMENT — PATIENT HEALTH QUESTIONNAIRE - PHQ9
SUM OF ALL RESPONSES TO PHQ QUESTIONS 1-9: 0
2. FEELING DOWN, DEPRESSED OR HOPELESS: 0
1. LITTLE INTEREST OR PLEASURE IN DOING THINGS: 0
SUM OF ALL RESPONSES TO PHQ QUESTIONS 1-9: 0
SUM OF ALL RESPONSES TO PHQ9 QUESTIONS 1 & 2: 0

## 2023-11-15 ASSESSMENT — ANXIETY QUESTIONNAIRES
IF YOU CHECKED OFF ANY PROBLEMS ON THIS QUESTIONNAIRE, HOW DIFFICULT HAVE THESE PROBLEMS MADE IT FOR YOU TO DO YOUR WORK, TAKE CARE OF THINGS AT HOME, OR GET ALONG WITH OTHER PEOPLE: NOT DIFFICULT AT ALL
7. FEELING AFRAID AS IF SOMETHING AWFUL MIGHT HAPPEN: 0
6. BECOMING EASILY ANNOYED OR IRRITABLE: 2
4. TROUBLE RELAXING: 2
GAD7 TOTAL SCORE: 8
2. NOT BEING ABLE TO STOP OR CONTROL WORRYING: 0
1. FEELING NERVOUS, ANXIOUS, OR ON EDGE: 2
3. WORRYING TOO MUCH ABOUT DIFFERENT THINGS: 2
5. BEING SO RESTLESS THAT IT IS HARD TO SIT STILL: 0

## 2023-11-15 ASSESSMENT — ENCOUNTER SYMPTOMS
ABDOMINAL PAIN: 0
DOUBLE VISION: 0
ORTHOPNEA: 0
VOMITING: 0
COUGH: 0
BACK PAIN: 0
BLOOD IN STOOL: 0
DIARRHEA: 0
NAUSEA: 0
SORE THROAT: 0
SPUTUM PRODUCTION: 0
SHORTNESS OF BREATH: 0
BLURRED VISION: 0
CONSTIPATION: 1
HEARTBURN: 0
WHEEZING: 0

## 2023-11-15 NOTE — PROGRESS NOTES
Joe Wood is a 70 y.o. female who presents today for     Chief Complaint   Patient presents with    6 Month Follow-Up    Discuss Labs    Medication Refill    Cough     Having sinus congestion and right ear feels plugged since halloween.  had bronchitis and cough. No fever. Grandkids have been sick too. Stress     Feeling a lot of stress and anxiety. Has been babysitting grandchildren helping out daughter. Felt overwhelmed, had a 14 day heart monitor on and just mailed back today. Flu Vaccine     Not today, not feeling. ASSESSMENT/PLAN, 4/13/22 VISIT:     Encounter for medication refill  -     digoxin (LANOXIN) 125 MCG tablet; Take 0.5 tablets by mouth daily  -     metoprolol tartrate (LOPRESSOR) 25 MG tablet; Take 1 tablet by mouth 2 times daily  -     fluticasone (FLONASE) 50 MCG/ACT nasal spray; Use 2 sprays each nostril QMWF, off other days  -     estradiol (ESTRACE) 0.5 MG tablet; Take 1 tablet by mouth daily  -     levothyroxine (SYNTHROID) 50 MCG tablet; Take 1 tablet by mouth daily    Acute sinusitis, recurrence not specified, unspecified location: As needed use of Flonase  -     fluticasone (FLONASE) 50 MCG/ACT nasal spray; Use 2 sprays each nostril QMWF, off other days    Postmenopausal symptoms: Stable well-controlled with Estrace  -     estradiol (ESTRACE) 0.5 MG tablet; Take 1 tablet by mouth daily    Acquired hypothyroidism: Stable and well controlled with current dose of levothyroxine  -     levothyroxine (SYNTHROID) 50 MCG tablet; Take 1 tablet by mouth daily  -     CBC with Auto Differential; Future  -     Comprehensive Metabolic Panel; Future  -     Lipid Panel; Future  -     T4; Future  -     TSH; Future    Atrial fibrillation with rapid ventricular response [facility]): Under care of cardiology; stable and well controlled  -     digoxin (LANOXIN) 125 MCG tablet; Take 0.5 tablets by mouth daily  -     metoprolol tartrate (LOPRESSOR) 25 MG tablet;  Take 1 tablet by mouth 2

## 2023-11-29 DIAGNOSIS — I48.91 ATRIAL FIBRILLATION WITH RAPID VENTRICULAR RESPONSE (HCC): ICD-10-CM

## 2023-11-30 ENCOUNTER — TELEPHONE (OUTPATIENT)
Dept: CARDIOLOGY CLINIC | Age: 63
End: 2023-11-30

## 2023-11-30 NOTE — TELEPHONE ENCOUNTER
----- Message from Jaime Gonzáles MD sent at 11/30/2023  7:29 AM EST -----  Heart monitor showed normal rhythm  with 8 episodes of non sustained SVT lasting only <6 beats (abnormal rhythm but not life threatening). She also had some symptomatic skipped heart beats or extra beats. Continue metoprolol 25 mg po twice a day and follow up with me as scheduled.

## 2024-01-11 ENCOUNTER — OFFICE VISIT (OUTPATIENT)
Dept: CARDIOLOGY CLINIC | Age: 64
End: 2024-01-11
Payer: MEDICARE

## 2024-01-11 VITALS
BODY MASS INDEX: 21.64 KG/M2 | RESPIRATION RATE: 16 BRPM | HEART RATE: 67 BPM | DIASTOLIC BLOOD PRESSURE: 60 MMHG | WEIGHT: 117.6 LBS | SYSTOLIC BLOOD PRESSURE: 100 MMHG | HEIGHT: 62 IN

## 2024-01-11 DIAGNOSIS — I48.91 NEW ONSET ATRIAL FIBRILLATION (HCC): Primary | ICD-10-CM

## 2024-01-11 DIAGNOSIS — I48.0 PAROXYSMAL ATRIAL FIBRILLATION (HCC): ICD-10-CM

## 2024-01-11 DIAGNOSIS — Z76.0 ENCOUNTER FOR MEDICATION REFILL: ICD-10-CM

## 2024-01-11 PROCEDURE — 93000 ELECTROCARDIOGRAM COMPLETE: CPT | Performed by: INTERNAL MEDICINE

## 2024-01-11 PROCEDURE — 99213 OFFICE O/P EST LOW 20 MIN: CPT | Performed by: INTERNAL MEDICINE

## 2024-01-11 PROCEDURE — 1123F ACP DISCUSS/DSCN MKR DOCD: CPT | Performed by: INTERNAL MEDICINE

## 2024-01-11 RX ORDER — METOPROLOL SUCCINATE 25 MG
25 TABLET, EXTENDED RELEASE 24 HR ORAL DAILY
Qty: 30 TABLET | Refills: 3
Start: 2024-01-11

## 2024-01-11 NOTE — PROGRESS NOTES
OUTPATIENT CARDIOLOGY FOLLOW-UP    Name: Delia Rosa    Age: 63 y.o.    Primary Care Physician: Gia Phipps MD    Date of Service: 1/11/2024    Chief Complaint:   Chief Complaint   Patient presents with    Atrial Fibrillation     6 months- Patient complains of palpitations and being very fatigue.        Interim History:   Mrs. Rosa is a 63-year-old  female with history of hypothyroidism and HRT, trigeminal neuralgia, bilateral hip replacements, COVID-19 infection in 2020 not hospitalized: Lifetime non-smoker and denies any history of hypertension, CHF, CVA, MIs, diabetes.  She presented to the emergency room with intermittent episodes of palpitations since Friday.  She was diagnosed with this sinus congestion on 2/17/2022 and was initiated on Augmentin and Medrol Dosepak.  She started having severe symptoms and palpitations. She also noticed mild chest discomfort or pressure when she turned to her left side while laying in the bed.  She denied chest pain with exertion, dizziness, lightheadedness, orthopnea, PND, or dyspnea with exertion.  Her daughter who is a nurse anesthetist checked on her and brought her to the emergency room due to fast heart rate for further evaluation.  In the emergency room her blood pressure was 98/73 she was afebrile.  Chest x-ray showed no acute findings EKG showed A. fib with RVR.  Lab work showed creatinine 1.6 and WBC 8.2 hemoglobin 14.3 proBNP 180.  She was initiated on therapeutic Lovenox and was started on Eliquis.  She also received 10 mg IV Cardizem followed by Cardizem infusion     Patient was seen as a new consult on 2/19/2022 and was diagnosed with a new onset atrial fibrillation with RVR.  She had a [id number] Vasc score of 0 has bled score 0.  She was started on digoxin and metoprolol and she converted to sinus rhythm.  She had an echocardiogram which showed normal LV and RV function.  No significant valvular heart disease was noted.  She had a

## 2024-01-11 NOTE — PATIENT INSTRUCTIONS
Holter monitor results reviewed and discussed with the patient,no AF and only had 8 runs of non sustained SVT.   Now,  CHADS2 Vasc score  is 2 after she reached 63.  This was discussed with the patient and was recommended anticoagulant therapy.  Patient decided to hold anticoagulant therapy at this time.  She does have Kardia device at home and was recommended to monitor her heart rhythm on a regular basis and call me if she develops further episodes of A-fib.  Will change metoprolol to Toprol XL 25 mg po daily  Follow-up with me in 6 months.

## 2024-01-15 RX ORDER — METOPROLOL SUCCINATE 25 MG/1
25 TABLET, EXTENDED RELEASE ORAL DAILY
Qty: 30 TABLET | Refills: 11 | Status: SHIPPED | OUTPATIENT
Start: 2024-01-15

## 2024-02-07 ENCOUNTER — E-VISIT (OUTPATIENT)
Dept: PRIMARY CARE CLINIC | Age: 64
End: 2024-02-07
Payer: MEDICARE

## 2024-02-07 DIAGNOSIS — J06.9 VIRAL UPPER RESPIRATORY TRACT INFECTION: Primary | ICD-10-CM

## 2024-02-07 PROCEDURE — 99422 OL DIG E/M SVC 11-20 MIN: CPT | Performed by: NURSE PRACTITIONER

## 2024-02-07 ASSESSMENT — LIFESTYLE VARIABLES: SMOKING_STATUS: NO, I'VE NEVER SMOKED

## 2024-02-07 NOTE — PROGRESS NOTES
Delia Rosa (1960) initiated an asynchronous digital communication through [employer].    HPI: per patient questionnaire     Exam: not applicable    Diagnoses and all orders for this visit:  Diagnoses and all orders for this visit:    Viral upper respiratory tract infection      Patient symptoms started 3 days ago.  We have been seeing a lot of influenza and COVID.  This is likely viral.  Recommend supportive care measures for at least 7 days before starting antibiotics.  Supportive care suggestions were provided.  Follow with PCP as needed    Time: EV2 - 11-20 minutes were spent on the digital evaluation and management of this patient. 18 min    Jacqueline Walters, APRN - CNP

## 2024-02-13 ENCOUNTER — OFFICE VISIT (OUTPATIENT)
Dept: FAMILY MEDICINE CLINIC | Age: 64
End: 2024-02-13
Payer: MEDICARE

## 2024-02-13 VITALS
TEMPERATURE: 97.6 F | OXYGEN SATURATION: 97 % | RESPIRATION RATE: 16 BRPM | SYSTOLIC BLOOD PRESSURE: 112 MMHG | WEIGHT: 117 LBS | HEIGHT: 62 IN | DIASTOLIC BLOOD PRESSURE: 80 MMHG | BODY MASS INDEX: 21.53 KG/M2 | HEART RATE: 68 BPM

## 2024-02-13 DIAGNOSIS — H65.93 BILATERAL SEROUS OTITIS MEDIA, UNSPECIFIED CHRONICITY: ICD-10-CM

## 2024-02-13 DIAGNOSIS — R09.81 SINUS CONGESTION: ICD-10-CM

## 2024-02-13 DIAGNOSIS — J01.00 ACUTE NON-RECURRENT MAXILLARY SINUSITIS: Primary | ICD-10-CM

## 2024-02-13 PROCEDURE — 99213 OFFICE O/P EST LOW 20 MIN: CPT | Performed by: NURSE PRACTITIONER

## 2024-02-13 PROCEDURE — 1123F ACP DISCUSS/DSCN MKR DOCD: CPT | Performed by: NURSE PRACTITIONER

## 2024-02-13 RX ORDER — AMOXICILLIN 875 MG/1
875 TABLET, COATED ORAL 2 TIMES DAILY
Qty: 20 TABLET | Refills: 0 | Status: SHIPPED | OUTPATIENT
Start: 2024-02-13 | End: 2024-02-23

## 2024-02-13 NOTE — PROGRESS NOTES
myself)  Labs:  No results found for this visit on 02/13/24.    Imaging:  All Radiology results interpreted by Radiologist unless otherwise noted.  No orders to display       Assessment / Plan:   The patient's vitals, allergies, medications, and past medical history have been reviewed.  Delia was seen today for facial pain, nasal congestion, otalgia, headache and fatigue.    Diagnoses and all orders for this visit:    Acute non-recurrent maxillary sinusitis  -     amoxicillin (AMOXIL) 875 MG tablet; Take 1 tablet by mouth 2 times daily for 10 days    Bilateral serous otitis media, unspecified chronicity    Sinus congestion        - Disposition: Home    - Educational material printed for patient's review and were included in patient instructions. After Visit Summary was given to patient at the end of visit.    - Encouraged oral fluids and rest. Discussed symptomatic treatments with patient today (Tylenol prn pain / fever, Zyrtec / Flonase prn rhinitis). The patient is to follow-up with PCP in the next 2-3 days for reevaluation. Red flag symptoms were also discussed with the patient today. If symptoms worsen the patient is to go directly to the emergency department for reevaluation and treatment. Pt verbalizes understanding and is in agreement with plan of care. All questions answered.    SIGNATURE: Rik Flanagan, ROSI-CNP    *NOTE: This report was transcribed using voice recognition software. Every effort was made to ensure accuracy; however, inadvertent computerized transcription errors may be present.

## 2024-03-17 SDOH — HEALTH STABILITY: PHYSICAL HEALTH: ON AVERAGE, HOW MANY DAYS PER WEEK DO YOU ENGAGE IN MODERATE TO STRENUOUS EXERCISE (LIKE A BRISK WALK)?: 3 DAYS

## 2024-03-17 SDOH — HEALTH STABILITY: PHYSICAL HEALTH: ON AVERAGE, HOW MANY MINUTES DO YOU ENGAGE IN EXERCISE AT THIS LEVEL?: 60 MIN

## 2024-03-17 ASSESSMENT — PATIENT HEALTH QUESTIONNAIRE - PHQ9
SUM OF ALL RESPONSES TO PHQ QUESTIONS 1-9: 0
SUM OF ALL RESPONSES TO PHQ QUESTIONS 1-9: 0
SUM OF ALL RESPONSES TO PHQ9 QUESTIONS 1 & 2: 0
2. FEELING DOWN, DEPRESSED OR HOPELESS: NOT AT ALL
1. LITTLE INTEREST OR PLEASURE IN DOING THINGS: NOT AT ALL
SUM OF ALL RESPONSES TO PHQ QUESTIONS 1-9: 0
SUM OF ALL RESPONSES TO PHQ QUESTIONS 1-9: 0

## 2024-03-17 ASSESSMENT — LIFESTYLE VARIABLES
HOW OFTEN DO YOU HAVE SIX OR MORE DRINKS ON ONE OCCASION: 1
HOW MANY STANDARD DRINKS CONTAINING ALCOHOL DO YOU HAVE ON A TYPICAL DAY: 1
HOW OFTEN DO YOU HAVE A DRINK CONTAINING ALCOHOL: 2-3 TIMES A WEEK
HOW OFTEN DO YOU HAVE A DRINK CONTAINING ALCOHOL: 4
HOW MANY STANDARD DRINKS CONTAINING ALCOHOL DO YOU HAVE ON A TYPICAL DAY: 1 OR 2

## 2024-03-19 ENCOUNTER — OFFICE VISIT (OUTPATIENT)
Dept: FAMILY MEDICINE CLINIC | Age: 64
End: 2024-03-19
Payer: MEDICARE

## 2024-03-19 VITALS
TEMPERATURE: 97.5 F | OXYGEN SATURATION: 97 % | RESPIRATION RATE: 16 BRPM | HEIGHT: 62 IN | DIASTOLIC BLOOD PRESSURE: 70 MMHG | BODY MASS INDEX: 21.59 KG/M2 | HEART RATE: 75 BPM | SYSTOLIC BLOOD PRESSURE: 100 MMHG | WEIGHT: 117.3 LBS

## 2024-03-19 DIAGNOSIS — Z12.31 ENCOUNTER FOR SCREENING MAMMOGRAM FOR MALIGNANT NEOPLASM OF BREAST: ICD-10-CM

## 2024-03-19 DIAGNOSIS — N95.9 POSTMENOPAUSAL SYMPTOMS: ICD-10-CM

## 2024-03-19 DIAGNOSIS — Z00.00 INITIAL MEDICARE ANNUAL WELLNESS VISIT: Primary | ICD-10-CM

## 2024-03-19 DIAGNOSIS — E03.9 ACQUIRED HYPOTHYROIDISM: ICD-10-CM

## 2024-03-19 DIAGNOSIS — R45.4 ANGER REACTION: ICD-10-CM

## 2024-03-19 DIAGNOSIS — Z76.0 ENCOUNTER FOR MEDICATION REFILL: ICD-10-CM

## 2024-03-19 PROCEDURE — G0438 PPPS, INITIAL VISIT: HCPCS | Performed by: FAMILY MEDICINE

## 2024-03-19 PROCEDURE — 1123F ACP DISCUSS/DSCN MKR DOCD: CPT | Performed by: FAMILY MEDICINE

## 2024-03-19 RX ORDER — ESTRADIOL 0.5 MG/1
0.5 TABLET ORAL DAILY
Qty: 90 TABLET | Refills: 3 | Status: SHIPPED | OUTPATIENT
Start: 2024-03-19 | End: 2025-03-19

## 2024-03-19 RX ORDER — LEVOTHYROXINE SODIUM 0.05 MG/1
50 TABLET ORAL DAILY
Qty: 90 TABLET | Refills: 3 | Status: SHIPPED | OUTPATIENT
Start: 2024-03-19 | End: 2025-03-19

## 2024-03-19 SDOH — ECONOMIC STABILITY: FOOD INSECURITY: WITHIN THE PAST 12 MONTHS, THE FOOD YOU BOUGHT JUST DIDN'T LAST AND YOU DIDN'T HAVE MONEY TO GET MORE.: NEVER TRUE

## 2024-03-19 SDOH — ECONOMIC STABILITY: FOOD INSECURITY: WITHIN THE PAST 12 MONTHS, YOU WORRIED THAT YOUR FOOD WOULD RUN OUT BEFORE YOU GOT MONEY TO BUY MORE.: NEVER TRUE

## 2024-03-19 SDOH — ECONOMIC STABILITY: INCOME INSECURITY: HOW HARD IS IT FOR YOU TO PAY FOR THE VERY BASICS LIKE FOOD, HOUSING, MEDICAL CARE, AND HEATING?: NOT HARD AT ALL

## 2024-03-19 NOTE — PATIENT INSTRUCTIONS
attack. These may include:    Chest pain or pressure, or a strange feeling in the chest.     Sweating.     Shortness of breath.     Pain, pressure, or a strange feeling in the back, neck, jaw, or upper belly or in one or both shoulders or arms.     Lightheadedness or sudden weakness.     A fast or irregular heartbeat.   After you call 911, the  may tell you to chew 1 adult-strength or 2 to 4 low-dose aspirin. Wait for an ambulance. Do not try to drive yourself.  Watch closely for changes in your health, and be sure to contact your doctor if you have any problems.  Where can you learn more?  Go to https://www.[employer].net/patientEd and enter F075 to learn more about \"A Healthy Heart: Care Instructions.\"  Current as of: June 24, 2023               Content Version: 14.0  © 4127-6863 [employer].   Care instructions adapted under license by [employer]. If you have questions about a medical condition or this instruction, always ask your healthcare professional. [employer] disclaims any warranty or liability for your use of this information.      Personalized Preventive Plan for Delia Rosa - 3/19/2024  Medicare offers a range of preventive health benefits. Some of the tests and screenings are paid in full while other may be subject to a deductible, co-insurance, and/or copay.    Some of these benefits include a comprehensive review of your medical history including lifestyle, illnesses that may run in your family, and various assessments and screenings as appropriate.    After reviewing your medical record and screening and assessments performed today your provider may have ordered immunizations, labs, imaging, and/or referrals for you.  A list of these orders (if applicable) as well as your Preventive Care list are included within your After Visit Summary for your review.    Other Preventive Recommendations:    A preventive eye exam performed by an eye specialist is recommended

## 2024-03-19 NOTE — PROGRESS NOTES
Medicare Annual Wellness Visit    Delia JAMSHID Dalemarito is here for     Chief Complaint   Patient presents with    Medicare AWV        Assessment & Plan     Initial Medicare annual wellness visit: Topics include situational anger management, breast cancer screening and medication refills        -     External Referral To Counseling Services; patient did agree to look into counseling to help with anger reaction    Encounter for medication refill  -     estradiol (ESTRACE) 0.5 MG tablet; Take 1 tablet by mouth daily, Disp-90 tablet, R-33/19/24: DISREGARD PREVIOUS PRESCRIPTIONS AND REFILLS; HONOR THIS PRESCRIPTION AND REFILLSNormal  -     levothyroxine (SYNTHROID) 50 MCG tablet; Take 1 tablet by mouth daily, Disp-90 tablet, R-33/19/24: DISREGARD PREVIOUS PRESCRIPTIONS AND REFILLS; HONOR THIS PRESCRIPTION AND REFILLSNormal    Postmenopausal symptoms  -     estradiol (ESTRACE) 0.5 MG tablet; Take 1 tablet by mouth daily, Disp-90 tablet, R-33/19/24: DISREGARD PREVIOUS PRESCRIPTIONS AND REFILLS; HONOR THIS PRESCRIPTION AND REFILLSNormal    Acquired hypothyroidism: Stable and well controlled.  Medication refill and future lab work  -     levothyroxine (SYNTHROID) 50 MCG tablet; Take 1 tablet by mouth daily, Disp-90 tablet, R-33/19/24: DISREGARD PREVIOUS PRESCRIPTIONS AND REFILLS; HONOR THIS PRESCRIPTION AND REFILLSNormal  -     CBC with Auto Differential; Future  -     Comprehensive Metabolic Panel; Future  -     Lipid Panel; Future  -     T4; Future  -     TSH; Future    Anger reaction: Situational but having a not insignificant impact on her life  -     External Referral To Counseling Services; patient did agree to look into counseling to help with anger reaction    Encounter for screening mammogram for malignant neoplasm of breast  -     ESTRADA SHARONDA DIGITAL SCREEN BILATERAL; Future    Recommendations for Preventive Services Due: see orders and patient instructions/AVS.  Recommended screening schedule for the next 5-10

## 2024-03-28 ENCOUNTER — TELEPHONE (OUTPATIENT)
Dept: FAMILY MEDICINE CLINIC | Age: 64
End: 2024-03-28

## 2024-06-14 ENCOUNTER — OFFICE VISIT (OUTPATIENT)
Dept: CARDIOLOGY CLINIC | Age: 64
End: 2024-06-14
Payer: MEDICARE

## 2024-06-14 VITALS
HEART RATE: 61 BPM | HEIGHT: 62 IN | RESPIRATION RATE: 16 BRPM | BODY MASS INDEX: 20.13 KG/M2 | SYSTOLIC BLOOD PRESSURE: 122 MMHG | WEIGHT: 109.4 LBS | DIASTOLIC BLOOD PRESSURE: 80 MMHG

## 2024-06-14 DIAGNOSIS — I48.91 ATRIAL FIBRILLATION WITH RAPID VENTRICULAR RESPONSE (HCC): Primary | ICD-10-CM

## 2024-06-14 PROCEDURE — 93000 ELECTROCARDIOGRAM COMPLETE: CPT | Performed by: INTERNAL MEDICINE

## 2024-06-14 PROCEDURE — 99213 OFFICE O/P EST LOW 20 MIN: CPT | Performed by: INTERNAL MEDICINE

## 2024-06-14 PROCEDURE — 1123F ACP DISCUSS/DSCN MKR DOCD: CPT | Performed by: INTERNAL MEDICINE

## 2024-06-14 RX ORDER — METOPROLOL SUCCINATE 25 MG/1
25 TABLET, EXTENDED RELEASE ORAL DAILY
Qty: 90 TABLET | Refills: 3 | Status: SHIPPED | OUTPATIENT
Start: 2024-06-14

## 2024-06-14 NOTE — PATIENT INSTRUCTIONS
Prior heart monitor/Holter monitor showed no A-fib except short runs of SVT.  Patient remains asymptomatic at this time no further episodes of A-fib or palpitations.  Now,  CHADS2 Vasc score  is 2 after she reached 63.  This was discussed with the patient and was recommended anticoagulant therapy.  Patient decided to hold anticoagulant therapy at this time.  She does have Kardia device at home and was recommended to monitor her heart rhythm on a regular basis and call me if she develops further episodes of A-fib.  Will continue Toprol XL 25 mg po daily  Follow-up with me in 6 months.

## 2024-06-14 NOTE — PROGRESS NOTES
OUTPATIENT CARDIOLOGY FOLLOW-UP    Name: Delia Rosa    Age: 64 y.o.    Primary Care Physician: Gia Phipps MD    Date of Service: 6/14/2024    Chief Complaint:   Chief Complaint   Patient presents with    Other     Office visit       Interim History:   Mrs. Rosa is a 64-year-old  female with history of hypothyroidism and HRT, trigeminal neuralgia, bilateral hip replacements, COVID-19 infection in 2020 not hospitalized: Lifetime non-smoker and denies any history of hypertension, CHF, CVA, MIs, diabetes.  She presented to the emergency room with intermittent episodes of palpitations since Friday.  She was diagnosed with this sinus congestion on 2/17/2022 and was initiated on Augmentin and Medrol Dosepak.  She started having severe symptoms and palpitations. She also noticed mild chest discomfort or pressure when she turned to her left side while laying in the bed.  She denied chest pain with exertion, dizziness, lightheadedness, orthopnea, PND, or dyspnea with exertion.  Her daughter who is a nurse anesthetist checked on her and brought her to the emergency room due to fast heart rate for further evaluation.  In the emergency room her blood pressure was 98/73 she was afebrile.  Chest x-ray showed no acute findings EKG showed A. fib with RVR.  Lab work showed creatinine 1.6 and WBC 8.2 hemoglobin 14.3 proBNP 180.  She was initiated on therapeutic Lovenox and was started on Eliquis.  She also received 10 mg IV Cardizem followed by Cardizem infusion     Patient was seen as a new consult on 2/19/2022 and was diagnosed with a new onset atrial fibrillation with RVR.  She had a TRI3PP8 Vasc score of 0 has bled score 0.  She was started on digoxin and metoprolol and she converted to sinus rhythm.  She had an echocardiogram which showed normal LV and RV function.  No significant valvular heart disease was noted.  She had a 14-day Holter monitor which showed predominant sinus rhythm with a few

## 2024-06-24 ENCOUNTER — HOSPITAL ENCOUNTER (OUTPATIENT)
Dept: MAMMOGRAPHY | Age: 64
Discharge: HOME | End: 2024-06-26
Payer: MEDICARE

## 2024-06-24 VITALS — BODY MASS INDEX: 20.3 KG/M2 | WEIGHT: 111 LBS

## 2024-06-24 DIAGNOSIS — Z12.31 ENCOUNTER FOR SCREENING MAMMOGRAM FOR MALIGNANT NEOPLASM OF BREAST: ICD-10-CM

## 2024-06-24 PROCEDURE — 77063 BREAST TOMOSYNTHESIS BI: CPT

## 2024-06-25 NOTE — RESULT ENCOUNTER NOTE
IMPRESSION:  No mammographic evidence of malignancy.    Annual screening mammography recommended    Negative.    OVERALL ASSESSMENT - NEGATIVE

## 2024-08-12 ENCOUNTER — TELEPHONE (OUTPATIENT)
Dept: CARDIOLOGY CLINIC | Age: 64
End: 2024-08-12

## 2024-08-12 NOTE — TELEPHONE ENCOUNTER
---- Message -----       From:Delia Rosa       Sent:8/11/2024  3:47 PM EDT         To:Dr. Dian Hills MD    Subject:Advise - Afib- roller coasters     Dr Hills     I hope to take my grandchildren to [place] on Tuesday to ride the roller coasters there.     We have always done this in the past , but not since I have had that isolated Afib incident Feb 2022.   The monitor I have worn twice - has not shown any more episodes - only an occasional fast run of beats or an occasional missed beat . I do take Metoprolol 25Mg Er24 hour daily per your direction .     Would it be ok to go and ride the roller coasters ?   The reason I ask is the signs usually say - do not  ride if you are pregnant- back problems or a heart condition.   I believe I would not be considered having a “ heart condition “ .  Unless I am fooling myself .     I am 64 with no other health issues other than arthritis.     Thank you  I appreciate your time   Delia Rosa

## 2024-09-18 ENCOUNTER — HOSPITAL ENCOUNTER (OUTPATIENT)
Age: 64
Discharge: HOME | End: 2024-09-18
Payer: MEDICARE

## 2024-09-18 DIAGNOSIS — E03.9 ACQUIRED HYPOTHYROIDISM: ICD-10-CM

## 2024-09-18 LAB
ALBUMIN SERPL-MCNC: 4.1 G/DL (ref 3.5–5.2)
ALP SERPL-CCNC: 90 U/L (ref 35–104)
ALT SERPL-CCNC: 18 U/L (ref 0–32)
ANION GAP SERPL CALCULATED.3IONS-SCNC: 10 MMOL/L (ref 7–16)
AST SERPL-CCNC: 20 U/L (ref 0–31)
BASOPHILS # BLD: 0.02 K/UL (ref 0–0.2)
BASOPHILS NFR BLD: 1 % (ref 0–2)
BILIRUB SERPL-MCNC: 0.7 MG/DL (ref 0–1.2)
BUN SERPL-MCNC: 15 MG/DL (ref 6–23)
CALCIUM SERPL-MCNC: 9 MG/DL (ref 8.6–10.2)
CHLORIDE SERPL-SCNC: 104 MMOL/L (ref 98–107)
CHOLEST SERPL-MCNC: 191 MG/DL
CO2 SERPL-SCNC: 25 MMOL/L (ref 22–29)
CREAT SERPL-MCNC: 0.7 MG/DL (ref 0.5–1)
EOSINOPHIL # BLD: 0.17 K/UL (ref 0.05–0.5)
EOSINOPHILS RELATIVE PERCENT: 4 % (ref 0–6)
ERYTHROCYTE [DISTWIDTH] IN BLOOD BY AUTOMATED COUNT: 14.5 % (ref 11.5–15)
GFR, ESTIMATED: >90 ML/MIN/1.73M2
GLUCOSE SERPL-MCNC: 88 MG/DL (ref 74–99)
HCT VFR BLD AUTO: 39.9 % (ref 34–48)
HDLC SERPL-MCNC: 96 MG/DL
HGB BLD-MCNC: 13.1 G/DL (ref 11.5–15.5)
IMM GRANULOCYTES # BLD AUTO: <0.03 K/UL (ref 0–0.58)
IMM GRANULOCYTES NFR BLD: 0 % (ref 0–5)
LDLC SERPL CALC-MCNC: 84 MG/DL
LYMPHOCYTES NFR BLD: 1.24 K/UL (ref 1.5–4)
LYMPHOCYTES RELATIVE PERCENT: 30 % (ref 20–42)
MCH RBC QN AUTO: 30.5 PG (ref 26–35)
MCHC RBC AUTO-ENTMCNC: 32.8 G/DL (ref 32–34.5)
MCV RBC AUTO: 92.8 FL (ref 80–99.9)
MONOCYTES NFR BLD: 0.4 K/UL (ref 0.1–0.95)
MONOCYTES NFR BLD: 10 % (ref 2–12)
NEUTROPHILS NFR BLD: 56 % (ref 43–80)
NEUTS SEG NFR BLD: 2.33 K/UL (ref 1.8–7.3)
PLATELET, FLUORESCENCE: 292 K/UL (ref 130–450)
PMV BLD AUTO: 9.9 FL (ref 7–12)
POTASSIUM SERPL-SCNC: 4.2 MMOL/L (ref 3.5–5)
PROT SERPL-MCNC: 6.4 G/DL (ref 6.4–8.3)
RBC # BLD AUTO: 4.3 M/UL (ref 3.5–5.5)
SODIUM SERPL-SCNC: 139 MMOL/L (ref 132–146)
T4 SERPL-MCNC: 8.8 UG/DL (ref 4.5–11.7)
TRIGL SERPL-MCNC: 55 MG/DL
TSH SERPL DL<=0.05 MIU/L-ACNC: 1.83 UIU/ML (ref 0.27–4.2)
VLDLC SERPL CALC-MCNC: 11 MG/DL
WBC OTHER # BLD: 4.2 K/UL (ref 4.5–11.5)

## 2024-09-18 PROCEDURE — 36415 COLL VENOUS BLD VENIPUNCTURE: CPT

## 2024-09-18 PROCEDURE — 85025 COMPLETE CBC W/AUTO DIFF WBC: CPT

## 2024-09-18 PROCEDURE — 84436 ASSAY OF TOTAL THYROXINE: CPT

## 2024-09-18 PROCEDURE — 80061 LIPID PANEL: CPT

## 2024-09-18 PROCEDURE — 80053 COMPREHEN METABOLIC PANEL: CPT

## 2024-09-18 PROCEDURE — 84443 ASSAY THYROID STIM HORMONE: CPT

## 2024-09-24 ENCOUNTER — OFFICE VISIT (OUTPATIENT)
Dept: FAMILY MEDICINE CLINIC | Age: 64
End: 2024-09-24
Payer: MEDICARE

## 2024-09-24 VITALS
SYSTOLIC BLOOD PRESSURE: 118 MMHG | RESPIRATION RATE: 18 BRPM | WEIGHT: 112.3 LBS | HEIGHT: 62 IN | BODY MASS INDEX: 20.67 KG/M2 | HEART RATE: 61 BPM | TEMPERATURE: 98.2 F | OXYGEN SATURATION: 97 % | DIASTOLIC BLOOD PRESSURE: 70 MMHG

## 2024-09-24 DIAGNOSIS — E03.9 ACQUIRED HYPOTHYROIDISM: ICD-10-CM

## 2024-09-24 DIAGNOSIS — N95.9 POSTMENOPAUSAL SYMPTOMS: ICD-10-CM

## 2024-09-24 DIAGNOSIS — Z23 NEED FOR TDAP VACCINATION: ICD-10-CM

## 2024-09-24 DIAGNOSIS — Z76.0 ENCOUNTER FOR MEDICATION REFILL: Primary | ICD-10-CM

## 2024-09-24 DIAGNOSIS — I48.0 PAROXYSMAL ATRIAL FIBRILLATION (HCC): ICD-10-CM

## 2024-09-24 PROCEDURE — 90715 TDAP VACCINE 7 YRS/> IM: CPT | Performed by: FAMILY MEDICINE

## 2024-09-24 PROCEDURE — 99214 OFFICE O/P EST MOD 30 MIN: CPT | Performed by: FAMILY MEDICINE

## 2024-09-24 PROCEDURE — 90471 IMMUNIZATION ADMIN: CPT | Performed by: FAMILY MEDICINE

## 2024-09-24 PROCEDURE — 1123F ACP DISCUSS/DSCN MKR DOCD: CPT | Performed by: FAMILY MEDICINE

## 2024-09-24 RX ORDER — ESTRADIOL 0.5 MG/1
0.5 TABLET ORAL DAILY
Qty: 91 TABLET | Refills: 3 | Status: SHIPPED | OUTPATIENT
Start: 2024-09-24 | End: 2025-09-24

## 2024-09-24 RX ORDER — LEVOTHYROXINE SODIUM 50 UG/1
50 TABLET ORAL DAILY
Qty: 91 TABLET | Refills: 3 | Status: SHIPPED | OUTPATIENT
Start: 2024-09-24 | End: 2025-09-24

## 2024-09-24 ASSESSMENT — ENCOUNTER SYMPTOMS
VOMITING: 0
BLURRED VISION: 0
SPUTUM PRODUCTION: 0
SORE THROAT: 0
ABDOMINAL PAIN: 0
COUGH: 0
WHEEZING: 0
SHORTNESS OF BREATH: 0
HEARTBURN: 0
BLOOD IN STOOL: 0
NAUSEA: 0
BACK PAIN: 0
DOUBLE VISION: 0
ORTHOPNEA: 0
CONSTIPATION: 1
DIARRHEA: 0

## 2024-11-20 ENCOUNTER — OFFICE VISIT (OUTPATIENT)
Dept: FAMILY MEDICINE CLINIC | Age: 64
End: 2024-11-20
Payer: MEDICARE

## 2024-11-20 VITALS
DIASTOLIC BLOOD PRESSURE: 70 MMHG | WEIGHT: 112 LBS | SYSTOLIC BLOOD PRESSURE: 110 MMHG | HEART RATE: 68 BPM | HEIGHT: 62 IN | RESPIRATION RATE: 18 BRPM | OXYGEN SATURATION: 98 % | BODY MASS INDEX: 20.61 KG/M2 | TEMPERATURE: 97.5 F

## 2024-11-20 DIAGNOSIS — J01.90 ACUTE SINUSITIS, RECURRENCE NOT SPECIFIED, UNSPECIFIED LOCATION: Primary | ICD-10-CM

## 2024-11-20 PROCEDURE — 1123F ACP DISCUSS/DSCN MKR DOCD: CPT

## 2024-11-20 PROCEDURE — 99213 OFFICE O/P EST LOW 20 MIN: CPT

## 2024-11-20 PROCEDURE — 1159F MED LIST DOCD IN RCRD: CPT

## 2024-11-20 RX ORDER — FLUTICASONE PROPIONATE 50 MCG
1 SPRAY, SUSPENSION (ML) NASAL 2 TIMES DAILY
Qty: 16 G | Refills: 0 | Status: SHIPPED | OUTPATIENT
Start: 2024-11-20

## 2024-11-20 RX ORDER — CEFDINIR 300 MG/1
300 CAPSULE ORAL EVERY 12 HOURS
Qty: 20 CAPSULE | Refills: 0 | Status: SHIPPED | OUTPATIENT
Start: 2024-11-20 | End: 2024-11-30

## 2024-11-20 NOTE — PROGRESS NOTES
Chief Complaint       Ear Fullness (Ears feels plugged.), Nasal Congestion (And sneezing. Blowing out yellow mucous thick in the morning and clears up to clear mucous.), and Pressure Behind the Eyes (All symptoms  for 2 weeks)      History of Present Illness   Source of history provided by:  patient.      Delia Rosa is a 64 y.o. old female   History of Present Illness  The patient presents for evaluation of a sinus infection.    She is experiencing symptoms consistent with a sinus infection, including headaches, pressure around her eyes and temples, nasal congestion, mucus drainage, and sneezing. She has been self-medicating with saline rinses, Tylenol, Advil, and Cold-Eeze for the past 2 weeks, but these have not fully alleviated her symptoms. She reports no cough but does have discomfort in her right ear. She uses Afrin nasal spray when her nasal congestion is severe.    She has a history of atrial fibrillation in 2022, which she believes was triggered by a previous sinus infection and steroid treatment.    All other ROS negative unless otherwise stated in HPI.      ROS    Unless otherwise stated in this report or unable to obtain because of the patient's clinical or mental status as evidenced by the medical record, this patients's positive and negative responses for Review of Systems, constitutional, psych, eyes, ENT, cardiovascular, respiratory, gastrointestinal, neurological, genitourinary, musculoskeletal, integument systems and systems related to the presenting problem are either stated in the preceding or were not pertinent or were negative for the symptoms and/or complaints related to the medical problem.    Physical Exam         VS:  /70   Pulse 68   Temp 97.5 °F (36.4 °C) (Temporal)   Resp 18   Ht 1.575 m (5' 2\")   Wt 50.8 kg (112 lb)   LMP 01/01/2005   SpO2 98%   BMI 20.49 kg/m²    Oxygen Saturation Interpretation: Normal.    Constitutional:  Alert, development consistent with

## 2025-01-14 ENCOUNTER — OFFICE VISIT (OUTPATIENT)
Dept: CARDIOLOGY CLINIC | Age: 65
End: 2025-01-14
Payer: MEDICARE

## 2025-01-14 VITALS
HEART RATE: 58 BPM | BODY MASS INDEX: 20.99 KG/M2 | TEMPERATURE: 96.8 F | OXYGEN SATURATION: 100 % | RESPIRATION RATE: 18 BRPM | DIASTOLIC BLOOD PRESSURE: 62 MMHG | SYSTOLIC BLOOD PRESSURE: 118 MMHG | HEIGHT: 61 IN | WEIGHT: 111.2 LBS

## 2025-01-14 DIAGNOSIS — I48.91 ATRIAL FIBRILLATION WITH RAPID VENTRICULAR RESPONSE (HCC): Primary | ICD-10-CM

## 2025-01-14 PROCEDURE — 1123F ACP DISCUSS/DSCN MKR DOCD: CPT | Performed by: INTERNAL MEDICINE

## 2025-01-14 PROCEDURE — 99213 OFFICE O/P EST LOW 20 MIN: CPT | Performed by: INTERNAL MEDICINE

## 2025-01-14 PROCEDURE — 1159F MED LIST DOCD IN RCRD: CPT | Performed by: INTERNAL MEDICINE

## 2025-01-14 PROCEDURE — 93000 ELECTROCARDIOGRAM COMPLETE: CPT | Performed by: INTERNAL MEDICINE

## 2025-01-14 RX ORDER — METOPROLOL SUCCINATE 25 MG/1
25 TABLET, EXTENDED RELEASE ORAL DAILY
Qty: 90 TABLET | Refills: 3 | Status: SHIPPED | OUTPATIENT
Start: 2025-01-14

## 2025-01-14 NOTE — PROGRESS NOTES
Results   Component Value Date    INR 0.9 02/19/2022    PROTIME 9.5 02/19/2022     Lab Results   Component Value Date    TSH 1.83 09/18/2024     No results found for: \"LABA1C\"  No results found for: \"EAG\"  Lab Results   Component Value Date    CHOL 191 09/18/2024    CHOL 218 (H) 08/29/2023    CHOL 202 (H) 08/30/2022     Lab Results   Component Value Date    TRIG 55 09/18/2024    TRIG 68 08/29/2023    TRIG 83 08/30/2022     Lab Results   Component Value Date    HDL 96 09/18/2024     08/29/2023    HDL 91 08/30/2022     No components found for: \"LDLCALC\", \"LDLCHOLESTEROL\"    Lab Results   Component Value Date    VLDL 11 09/18/2024    VLDL 14 08/29/2023    VLDL 17 08/30/2022     No results found for: \"CHOLHDLRATIO\"    Cardiac Tests:  ECG: SB, low voltage QRS complexes, abnormal EKG, no changes compared to prior EKG      Echocardiogram 2/20/2022:     Normal left ventricle size and systolic function.   Ejection fraction is visually estimated at 55-60%.   No regional wall motion abnormalities seen.   Normal left ventricle wall thickness.   Indeterminate diastolic function.   Normal right ventricular size and function. TAPSE 23 mm.   Mild mitral regurgitation is present.   No hemodynamically significant aortic stenosis is present.   Unable to estimate PA systolic pressure.   No evidence for hemodynamically significant pericardial effusion.  Stress test: N/A      Cardiac catheterization: N/A    14-day Holter monitor-11/1/2023 to 11/14/2023: Baseline rhythm normal sinus rhythm, minimal heart rate 54 bpm maximal heart rate 203 bpm.  Patient had 8 episodes of supraventricular tachycardia runs with the fastest interval lasting 6 beats at a heart rate of 103 bpm.  SVT with longest lasting 1 had 7 beats.  Occasional premature supraventricular complexes and ventricular complexes were noted.  No VT VF was noted no pauses were noted.  A-fib burden was 0.    The 10-year ASCVD risk score (Sadiq LARA, et al., 2019) is: 4.2%

## 2025-01-14 NOTE — PATIENT INSTRUCTIONS
Prior heart monitor/Holter monitor showed no A-fib except short runs of SVT.  Patient remains asymptomatic at this time no further episodes of A-fib or palpitations.  Now,  CHADS2 Vasc score  is 2 after she reached 63.  This was again discussed with the patient and was recommended anticoagulant therapy.  Patient decided to hold anticoagulant therapy at this time.  She does have Kardia device at home and was recommended to monitor her heart rhythm on a regular basis and call me if she develops further episodes of A-fib.  Will continue Toprol XL 25 mg po daily, patient was advised to monitor heart rate if the heart rate remains in the 50s then she can decrease the dose to 12.5 mg p.o. daily.  Patient was advised to use smart watch is to monitor rhythm.  Follow-up with me in 12 months.

## 2025-03-23 SDOH — ECONOMIC STABILITY: TRANSPORTATION INSECURITY
IN THE PAST 12 MONTHS, HAS THE LACK OF TRANSPORTATION KEPT YOU FROM MEDICAL APPOINTMENTS OR FROM GETTING MEDICATIONS?: NO

## 2025-03-23 SDOH — ECONOMIC STABILITY: FOOD INSECURITY: WITHIN THE PAST 12 MONTHS, THE FOOD YOU BOUGHT JUST DIDN'T LAST AND YOU DIDN'T HAVE MONEY TO GET MORE.: NEVER TRUE

## 2025-03-23 SDOH — ECONOMIC STABILITY: INCOME INSECURITY: IN THE LAST 12 MONTHS, WAS THERE A TIME WHEN YOU WERE NOT ABLE TO PAY THE MORTGAGE OR RENT ON TIME?: NO

## 2025-03-23 SDOH — ECONOMIC STABILITY: FOOD INSECURITY: WITHIN THE PAST 12 MONTHS, YOU WORRIED THAT YOUR FOOD WOULD RUN OUT BEFORE YOU GOT MONEY TO BUY MORE.: NEVER TRUE

## 2025-03-23 SDOH — HEALTH STABILITY: PHYSICAL HEALTH: ON AVERAGE, HOW MANY MINUTES DO YOU ENGAGE IN EXERCISE AT THIS LEVEL?: 90 MIN

## 2025-03-23 SDOH — HEALTH STABILITY: PHYSICAL HEALTH: ON AVERAGE, HOW MANY DAYS PER WEEK DO YOU ENGAGE IN MODERATE TO STRENUOUS EXERCISE (LIKE A BRISK WALK)?: 4 DAYS

## 2025-03-23 SDOH — ECONOMIC STABILITY: TRANSPORTATION INSECURITY
IN THE PAST 12 MONTHS, HAS LACK OF TRANSPORTATION KEPT YOU FROM MEETINGS, WORK, OR FROM GETTING THINGS NEEDED FOR DAILY LIVING?: NO

## 2025-03-23 ASSESSMENT — PATIENT HEALTH QUESTIONNAIRE - PHQ9
SUM OF ALL RESPONSES TO PHQ QUESTIONS 1-9: 0
2. FEELING DOWN, DEPRESSED OR HOPELESS: NOT AT ALL
SUM OF ALL RESPONSES TO PHQ QUESTIONS 1-9: 0
1. LITTLE INTEREST OR PLEASURE IN DOING THINGS: NOT AT ALL
SUM OF ALL RESPONSES TO PHQ QUESTIONS 1-9: 0
SUM OF ALL RESPONSES TO PHQ QUESTIONS 1-9: 0

## 2025-03-23 ASSESSMENT — LIFESTYLE VARIABLES
HOW OFTEN DO YOU HAVE A DRINK CONTAINING ALCOHOL: 4
HOW MANY STANDARD DRINKS CONTAINING ALCOHOL DO YOU HAVE ON A TYPICAL DAY: PATIENT DECLINED
HOW OFTEN DO YOU HAVE A DRINK CONTAINING ALCOHOL: 2-3 TIMES A WEEK
HOW OFTEN DO YOU HAVE SIX OR MORE DRINKS ON ONE OCCASION: 1
HOW MANY STANDARD DRINKS CONTAINING ALCOHOL DO YOU HAVE ON A TYPICAL DAY: 98

## 2025-03-25 ENCOUNTER — OFFICE VISIT (OUTPATIENT)
Dept: FAMILY MEDICINE CLINIC | Age: 65
End: 2025-03-25
Payer: MEDICARE

## 2025-03-25 VITALS
HEIGHT: 61 IN | TEMPERATURE: 96.8 F | DIASTOLIC BLOOD PRESSURE: 70 MMHG | OXYGEN SATURATION: 98 % | RESPIRATION RATE: 16 BRPM | BODY MASS INDEX: 19.83 KG/M2 | SYSTOLIC BLOOD PRESSURE: 102 MMHG | WEIGHT: 105 LBS | HEART RATE: 72 BPM

## 2025-03-25 DIAGNOSIS — Z00.00 MEDICARE ANNUAL WELLNESS VISIT, SUBSEQUENT: Primary | ICD-10-CM

## 2025-03-25 DIAGNOSIS — Z12.31 ENCOUNTER FOR SCREENING MAMMOGRAM FOR MALIGNANT NEOPLASM OF BREAST: ICD-10-CM

## 2025-03-25 DIAGNOSIS — E03.9 ACQUIRED HYPOTHYROIDISM: Primary | ICD-10-CM

## 2025-03-25 PROCEDURE — 1123F ACP DISCUSS/DSCN MKR DOCD: CPT | Performed by: FAMILY MEDICINE

## 2025-03-25 PROCEDURE — 1160F RVW MEDS BY RX/DR IN RCRD: CPT | Performed by: FAMILY MEDICINE

## 2025-03-25 PROCEDURE — 1159F MED LIST DOCD IN RCRD: CPT | Performed by: FAMILY MEDICINE

## 2025-03-25 PROCEDURE — G0439 PPPS, SUBSEQ VISIT: HCPCS | Performed by: FAMILY MEDICINE

## 2025-03-25 NOTE — PROGRESS NOTES
Medicare Annual Wellness Visit    Delia Rosa is here for     Chief Complaint   Patient presents with    Medicare AWV        Assessment & Plan     Medicare annual wellness visit, subsequent: Only issue is scheduling for mammography    Encounter for screening mammogram for malignant neoplasm of breast  -     ESTRADA SHARONDA DIGITAL SCREEN BILATERAL; Future    Follow Up:   Return 1) Keep scheduled appointment for 9/25; fasting lab work 1 week prior, for 2) Medicare Annual Wellness Visit in 1 year.       Subjective     The following acute and/or chronic problems were also addressed today:    Doing well personally, but her daughter developed a post partum psychosis, and patient and her  have been visiting family in [place] frequently.  Even with all of this, her own self care has been intact    Patient's complete Health Risk Assessment and screening values have been reviewed and are found in Flowsheets. The following problems were reviewed today and where indicated follow up appointments were made and/or referrals ordered.    Positive Risk Factor Screenings with Interventions:                    Safety:  Do you have non-slip mats or non-slip surfaces or shower bars or grab bars in your shower or bathtub?: (!) (Patient-Rptd) No    Interventions:  See AVS for additional education material  See A/P for plan and any pertinent orders                   Objective   Vitals:    03/25/25 0956   BP: 102/70   BP Site: Right Upper Arm   Patient Position: Sitting   BP Cuff Size: Medium Adult   Pulse: 72   Resp: 16   Temp: 96.8 °F (36 °C)   TempSrc: Infrared   SpO2: 98%   Weight: 47.6 kg (105 lb)   Height: 1.549 m (5' 1\")      Body mass index is 19.84 kg/m².        General Appearance: alert and oriented to person, place and time, well-developed and well-nourished, in no acute distress  Neck: neck supple and non tender without mass, no thyromegaly or thyroid nodules, no cervical lymphadenopathy   Pulmonary/Chest: clear to

## 2025-03-25 NOTE — PATIENT INSTRUCTIONS
Personalized Preventive Plan for Delia Rosa - 3/25/2025  Medicare offers a range of preventive health benefits. Some of the tests and screenings are paid in full while other may be subject to a deductible, co-insurance, and/or copay.  Some of these benefits include a comprehensive review of your medical history including lifestyle, illnesses that may run in your family, and various assessments and screenings as appropriate.  After reviewing your medical record and screening and assessments performed today your provider may have ordered immunizations, labs, imaging, and/or referrals for you.  A list of these orders (if applicable) as well as your Preventive Care list are included within your After Visit Summary for your review.

## 2025-05-22 ENCOUNTER — OFFICE VISIT (OUTPATIENT)
Dept: FAMILY MEDICINE CLINIC | Age: 65
End: 2025-05-22
Payer: MEDICARE

## 2025-05-22 VITALS
DIASTOLIC BLOOD PRESSURE: 64 MMHG | HEART RATE: 77 BPM | OXYGEN SATURATION: 98 % | BODY MASS INDEX: 19.84 KG/M2 | WEIGHT: 105 LBS | SYSTOLIC BLOOD PRESSURE: 110 MMHG | TEMPERATURE: 97.2 F

## 2025-05-22 DIAGNOSIS — H65.93 BILATERAL SEROUS OTITIS MEDIA, UNSPECIFIED CHRONICITY: ICD-10-CM

## 2025-05-22 DIAGNOSIS — J01.00 ACUTE NON-RECURRENT MAXILLARY SINUSITIS: Primary | ICD-10-CM

## 2025-05-22 PROCEDURE — 1160F RVW MEDS BY RX/DR IN RCRD: CPT

## 2025-05-22 PROCEDURE — 1159F MED LIST DOCD IN RCRD: CPT

## 2025-05-22 PROCEDURE — 1123F ACP DISCUSS/DSCN MKR DOCD: CPT

## 2025-05-22 PROCEDURE — 99213 OFFICE O/P EST LOW 20 MIN: CPT

## 2025-05-22 RX ORDER — LORATADINE 10 MG/1
10 TABLET ORAL DAILY
Qty: 14 TABLET | Refills: 0 | Status: SHIPPED | OUTPATIENT
Start: 2025-05-22 | End: 2025-06-05

## 2025-05-22 RX ORDER — AZITHROMYCIN 250 MG/1
TABLET, FILM COATED ORAL
Qty: 6 TABLET | Refills: 0 | Status: SHIPPED | OUTPATIENT
Start: 2025-05-22

## 2025-05-22 RX ORDER — FLUTICASONE PROPIONATE 50 MCG
1 SPRAY, SUSPENSION (ML) NASAL DAILY
Qty: 15.8 ML | Refills: 0 | Status: SHIPPED | OUTPATIENT
Start: 2025-05-22 | End: 2025-06-05

## 2025-05-22 NOTE — PROGRESS NOTES
Chief Complaint   Sinusitis (Pressure, headache, congestion since last friday)      HPI   Source of history provided by: patient.      Delia Rosa is a 65 y.o. old female who presents to walk-in care for evaluation of sinus infection  X 7 days. Associated symptoms include headache, nasal congestion, and sinus pressure Since onset symptoms have not improved.  Has taken nothing at home. Denies fever, chills, sore throat, rhinorrhea, cough, wheezing, chest congestion, chest pain, shortness of breath, abdominal discomfort, nausea, vomiting, body aches, otalgia, and malaise. Pertinent PMH: PMHpositive: A fib in the past, thyroid .  Denies any PMH of URIhistory: COPD, asthma, recurrent bronchitis, and pneumonia. The patient has no history of tobacco abuse.    ROS   Pertinent positives and negatives are stated within HPI, all other systems reviewed and are negative.  Past Medical History:  has a past medical history of Acquired Hypothyroidism, Kidney cysts, Liver cyst, New onset atrial fibrillation (HCC), Osteoarthritis, Postmenopausal, Shingles, and Thyroid disease.  Surgical History:  has a past surgical history that includes Knee arthroscopy (Right, 2017); Colonoscopy (2008); pr colonoscopy w/biopsy single/multiple (N/A, 8/6/2018); Hysterectomy, total abdominal; and joint replacement (Bilateral).  Social History:  reports that she has never smoked. She has never used smokeless tobacco. She reports current alcohol use of about 5.0 standard drinks of alcohol per week. She reports that she does not use drugs.  Family History: family history includes Arthritis in her mother; Breast Cancer in her paternal aunt and paternal cousin; Cancer in her mother, paternal aunt, paternal aunt, paternal cousin, and paternal uncle; Heart Disease in her father; High Blood Pressure in her father; High Cholesterol in her father; Stroke in her father and paternal grandfather.  Allergies: Sulfa antibiotics    Physical Exam      VS:  BP

## 2025-07-16 ENCOUNTER — HOSPITAL ENCOUNTER (OUTPATIENT)
Dept: MAMMOGRAPHY | Age: 65
Discharge: HOME | End: 2025-07-18
Attending: FAMILY MEDICINE
Payer: MEDICARE

## 2025-07-16 DIAGNOSIS — Z12.31 ENCOUNTER FOR SCREENING MAMMOGRAM FOR MALIGNANT NEOPLASM OF BREAST: ICD-10-CM

## 2025-07-16 PROCEDURE — 77067 SCR MAMMO BI INCL CAD: CPT

## 2025-08-22 ENCOUNTER — TELEPHONE (OUTPATIENT)
Dept: CARDIOLOGY CLINIC | Age: 65
End: 2025-08-22

## 2025-08-22 RX ORDER — METOPROLOL SUCCINATE 25 MG/1
12.5 TABLET, EXTENDED RELEASE ORAL DAILY
Qty: 45 TABLET | Refills: 3 | Status: SHIPPED | OUTPATIENT
Start: 2025-08-22

## 2025-08-22 RX ORDER — METOPROLOL SUCCINATE 25 MG/1
12.5 TABLET, EXTENDED RELEASE ORAL DAILY
COMMUNITY
End: 2025-08-22 | Stop reason: SDUPTHER

## 2025-09-04 ENCOUNTER — HOSPITAL ENCOUNTER (OUTPATIENT)
Dept: LAB | Age: 65
Discharge: HOME | End: 2025-09-04
Payer: MEDICARE

## 2025-09-04 DIAGNOSIS — E03.9 ACQUIRED HYPOTHYROIDISM: ICD-10-CM

## 2025-09-04 LAB
ALBUMIN SERPL-MCNC: 4.3 G/DL (ref 3.5–5.2)
ALP SERPL-CCNC: 73 U/L (ref 35–104)
ALT SERPL-CCNC: 23 U/L (ref 0–35)
ANION GAP SERPL CALCULATED.3IONS-SCNC: 10 MMOL/L (ref 7–16)
AST SERPL-CCNC: 27 U/L (ref 0–35)
BASOPHILS # BLD: 0.02 K/UL (ref 0–0.2)
BASOPHILS NFR BLD: 0 % (ref 0–2)
BILIRUB SERPL-MCNC: 0.7 MG/DL (ref 0–1.2)
BUN SERPL-MCNC: 16 MG/DL (ref 8–23)
CALCIUM SERPL-MCNC: 8.8 MG/DL (ref 8.8–10.2)
CHLORIDE SERPL-SCNC: 105 MMOL/L (ref 98–107)
CHOLEST SERPL-MCNC: 196 MG/DL
CO2 SERPL-SCNC: 23 MMOL/L (ref 22–29)
CREAT SERPL-MCNC: 0.7 MG/DL (ref 0.5–1)
EOSINOPHIL # BLD: 0.08 K/UL (ref 0.05–0.5)
EOSINOPHILS RELATIVE PERCENT: 2 % (ref 0–6)
ERYTHROCYTE [DISTWIDTH] IN BLOOD BY AUTOMATED COUNT: 14.3 % (ref 11.5–15)
GFR, ESTIMATED: >90 ML/MIN/1.73M2
GLUCOSE SERPL-MCNC: 103 MG/DL (ref 74–99)
HCT VFR BLD AUTO: 41.6 % (ref 34–48)
HDLC SERPL-MCNC: 123 MG/DL
HGB BLD-MCNC: 13.9 G/DL (ref 11.5–15.5)
IMM GRANULOCYTES # BLD AUTO: <0.03 K/UL (ref 0–0.58)
IMM GRANULOCYTES NFR BLD: 0 % (ref 0–5)
LDLC SERPL CALC-MCNC: 60 MG/DL
LYMPHOCYTES NFR BLD: 1.29 K/UL (ref 1.5–4)
LYMPHOCYTES RELATIVE PERCENT: 25 % (ref 20–42)
MCH RBC QN AUTO: 30.3 PG (ref 26–35)
MCHC RBC AUTO-ENTMCNC: 33.4 G/DL (ref 32–34.5)
MCV RBC AUTO: 90.8 FL (ref 80–99.9)
MONOCYTES NFR BLD: 0.42 K/UL (ref 0.1–0.95)
MONOCYTES NFR BLD: 8 % (ref 2–12)
NEUTROPHILS NFR BLD: 65 % (ref 43–80)
NEUTS SEG NFR BLD: 3.33 K/UL (ref 1.8–7.3)
PLATELET # BLD AUTO: 273 K/UL (ref 130–450)
PMV BLD AUTO: 9.7 FL (ref 7–12)
POTASSIUM SERPL-SCNC: 4.2 MMOL/L (ref 3.5–5.1)
PROT SERPL-MCNC: 6.4 G/DL (ref 6.4–8.3)
RBC # BLD AUTO: 4.58 M/UL (ref 3.5–5.5)
SODIUM SERPL-SCNC: 138 MMOL/L (ref 136–145)
T4 SERPL-MCNC: 8.7 UG/DL (ref 4.5–11.7)
TRIGL SERPL-MCNC: 63 MG/DL
TSH SERPL DL<=0.05 MIU/L-ACNC: 0.99 UIU/ML (ref 0.27–4.2)
VLDLC SERPL CALC-MCNC: 13 MG/DL
WBC OTHER # BLD: 5.2 K/UL (ref 4.5–11.5)

## 2025-09-04 PROCEDURE — 85025 COMPLETE CBC W/AUTO DIFF WBC: CPT

## 2025-09-04 PROCEDURE — 80053 COMPREHEN METABOLIC PANEL: CPT

## 2025-09-04 PROCEDURE — 84436 ASSAY OF TOTAL THYROXINE: CPT

## 2025-09-04 PROCEDURE — 36415 COLL VENOUS BLD VENIPUNCTURE: CPT

## 2025-09-04 PROCEDURE — 84443 ASSAY THYROID STIM HORMONE: CPT

## 2025-09-04 PROCEDURE — 80061 LIPID PANEL: CPT

## (undated) DEVICE — CANNULA NSL ORAL AD FOR CAPNOFLEX CO2 O2 AIRLFE

## (undated) DEVICE — CAESAR GRASPING FORCEPS: Brand: CAESAR